# Patient Record
Sex: FEMALE | Race: WHITE | NOT HISPANIC OR LATINO | Employment: UNEMPLOYED | URBAN - NONMETROPOLITAN AREA
[De-identification: names, ages, dates, MRNs, and addresses within clinical notes are randomized per-mention and may not be internally consistent; named-entity substitution may affect disease eponyms.]

---

## 2017-01-19 ENCOUNTER — HOSPITAL ENCOUNTER (OUTPATIENT)
Dept: LAB | Facility: MEDICAL CENTER | Age: 67
End: 2017-01-19
Attending: NURSE PRACTITIONER
Payer: MEDICARE

## 2017-01-19 ENCOUNTER — TELEPHONE (OUTPATIENT)
Dept: MEDICAL GROUP | Facility: PHYSICIAN GROUP | Age: 67
End: 2017-01-19

## 2017-01-19 DIAGNOSIS — H53.9 VISUAL CHANGES: ICD-10-CM

## 2017-01-19 DIAGNOSIS — E78.00 PURE HYPERCHOLESTEROLEMIA: ICD-10-CM

## 2017-01-19 LAB
ALBUMIN SERPL BCP-MCNC: 4.4 G/DL (ref 3.2–4.9)
ALBUMIN/GLOB SERPL: 1.4 G/DL
ALP SERPL-CCNC: 70 U/L (ref 30–99)
ALT SERPL-CCNC: 27 U/L (ref 2–50)
ANION GAP SERPL CALC-SCNC: 9 MMOL/L (ref 0–11.9)
AST SERPL-CCNC: 29 U/L (ref 12–45)
BILIRUB SERPL-MCNC: 0.5 MG/DL (ref 0.1–1.5)
BUN SERPL-MCNC: 18 MG/DL (ref 8–22)
CALCIUM SERPL-MCNC: 9.5 MG/DL (ref 8.5–10.5)
CHLORIDE SERPL-SCNC: 106 MMOL/L (ref 96–112)
CHOLEST SERPL-MCNC: 233 MG/DL (ref 100–199)
CO2 SERPL-SCNC: 25 MMOL/L (ref 20–33)
CREAT SERPL-MCNC: 0.98 MG/DL (ref 0.5–1.4)
GLOBULIN SER CALC-MCNC: 3.1 G/DL (ref 1.9–3.5)
GLUCOSE SERPL-MCNC: 84 MG/DL (ref 65–99)
HDLC SERPL-MCNC: 68 MG/DL
LDLC SERPL CALC-MCNC: 141 MG/DL
POTASSIUM SERPL-SCNC: 3.8 MMOL/L (ref 3.6–5.5)
PROT SERPL-MCNC: 7.5 G/DL (ref 6–8.2)
SODIUM SERPL-SCNC: 140 MMOL/L (ref 135–145)
TRIGL SERPL-MCNC: 121 MG/DL (ref 0–149)

## 2017-01-19 PROCEDURE — 80061 LIPID PANEL: CPT

## 2017-01-19 PROCEDURE — 36415 COLL VENOUS BLD VENIPUNCTURE: CPT

## 2017-01-19 PROCEDURE — 80053 COMPREHEN METABOLIC PANEL: CPT

## 2017-01-19 NOTE — TELEPHONE ENCOUNTER
Hanna in for A1C this morning. Medicare will not except Visual Changes for DX code. Please advise.

## 2017-01-27 ENCOUNTER — OFFICE VISIT (OUTPATIENT)
Dept: MEDICAL GROUP | Facility: PHYSICIAN GROUP | Age: 67
End: 2017-01-27
Payer: MEDICARE

## 2017-01-27 ENCOUNTER — HOSPITAL ENCOUNTER (OUTPATIENT)
Facility: MEDICAL CENTER | Age: 67
End: 2017-01-27
Attending: NURSE PRACTITIONER
Payer: MEDICARE

## 2017-01-27 VITALS
WEIGHT: 201 LBS | DIASTOLIC BLOOD PRESSURE: 84 MMHG | TEMPERATURE: 98.4 F | RESPIRATION RATE: 16 BRPM | BODY MASS INDEX: 31.55 KG/M2 | OXYGEN SATURATION: 97 % | HEIGHT: 67 IN | HEART RATE: 80 BPM | SYSTOLIC BLOOD PRESSURE: 136 MMHG

## 2017-01-27 DIAGNOSIS — E66.9 OBESITY (BMI 30-39.9): ICD-10-CM

## 2017-01-27 DIAGNOSIS — R35.0 URINARY FREQUENCY: ICD-10-CM

## 2017-01-27 DIAGNOSIS — R94.4 DECREASED GFR: ICD-10-CM

## 2017-01-27 DIAGNOSIS — Z12.11 SCREENING FOR COLON CANCER: ICD-10-CM

## 2017-01-27 DIAGNOSIS — M54.9 RADIATING BACK PAIN: ICD-10-CM

## 2017-01-27 DIAGNOSIS — E78.49 OTHER HYPERLIPIDEMIA: ICD-10-CM

## 2017-01-27 DIAGNOSIS — Z80.0 FH: COLON CANCER: ICD-10-CM

## 2017-01-27 LAB
APPEARANCE UR: CLEAR
BILIRUB UR QL STRIP.AUTO: NEGATIVE
COLOR UR AUTO: NORMAL
CULTURE IF INDICATED INDCX: NO UA CULTURE
GLUCOSE UR STRIP.AUTO-MCNC: NEGATIVE MG/DL
KETONES UR STRIP.AUTO-MCNC: NEGATIVE MG/DL
LEUKOCYTE ESTERASE UR QL STRIP.AUTO: NEGATIVE
MICRO URNS: NORMAL
NITRITE UR QL STRIP.AUTO: NEGATIVE
PH UR: 5.5 [PH]
PROT UR QL STRIP: NEGATIVE MG/DL
RBC UR QL AUTO: NEGATIVE
SP GR UR STRIP.AUTO: 1.01

## 2017-01-27 PROCEDURE — G8419 CALC BMI OUT NRM PARAM NOF/U: HCPCS | Performed by: NURSE PRACTITIONER

## 2017-01-27 PROCEDURE — G8432 DEP SCR NOT DOC, RNG: HCPCS | Performed by: NURSE PRACTITIONER

## 2017-01-27 PROCEDURE — G8482 FLU IMMUNIZE ORDER/ADMIN: HCPCS | Performed by: NURSE PRACTITIONER

## 2017-01-27 PROCEDURE — 81003 URINALYSIS AUTO W/O SCOPE: CPT

## 2017-01-27 PROCEDURE — 3014F SCREEN MAMMO DOC REV: CPT | Performed by: NURSE PRACTITIONER

## 2017-01-27 PROCEDURE — 1036F TOBACCO NON-USER: CPT | Performed by: NURSE PRACTITIONER

## 2017-01-27 PROCEDURE — 3017F COLORECTAL CA SCREEN DOC REV: CPT | Performed by: NURSE PRACTITIONER

## 2017-01-27 PROCEDURE — 99214 OFFICE O/P EST MOD 30 MIN: CPT | Performed by: NURSE PRACTITIONER

## 2017-01-27 ASSESSMENT — PATIENT HEALTH QUESTIONNAIRE - PHQ9: CLINICAL INTERPRETATION OF PHQ2 SCORE: 0

## 2017-01-27 NOTE — ASSESSMENT & PLAN NOTE
Recent labs show a decreased GFR at 57. Looking at her past notes it appears that she has had this before:  Component      Latest Ref Rng 4/22/2015 11/5/2015 3/10/2016 9/2/2016           6:48 AM  7:54 AM  7:23 AM  7:18 AM   GFR If African American      >60 mL/min/1.73 m 2 >60 >60 >60 >60   GFR If Non African American      >60 mL/min/1.73 m 2 >60 54 (A) >60 >60     Component      Latest Ref Rng 1/19/2017           7:11 AM   GFR If African American      >60 mL/min/1.73 m 2 >60   GFR If Non African American      >60 mL/min/1.73 m 2 57 (A)   A number for meds could potentially causes specifically her Celebrex that she has been on for a number of years. We will check a urinalysis on her today as well since she is complaining of right back plane specifically around the CVA area as well as urinary frequency. We will monitor this as well. I am repeating a CMP in 6 months.

## 2017-01-27 NOTE — PATIENT INSTRUCTIONS
Continue on medication for cholesterol--Will recheck in 6 months    Continue with healthy diet, fresh fruits, veggies--less sugar and carbs    Will check urine today

## 2017-01-27 NOTE — PROGRESS NOTES
Chief Complaint   Patient presents with   • Follow-Up         This is a 66 y.o.female patient that presents today with the following: Follow-up, review labs, acute and chronic conditions    Hyperlipidemia  This is chronic in nature, stable much improved since starting on the TriCor 145 mg daily. Her lipid profile compared to her previous profiles are as follows:  Component      Latest Ref Rng 3/10/2016 9/2/2016 1/19/2017           7:23 AM  7:18 AM  7:11 AM   Cholesterol,Tot      100 - 199 mg/dL 252 (H) 271 (H) 233 (H)   Triglycerides      0 - 149 mg/dL 171 (H) 240 (H) 121   HDL      >=40 mg/dL 43 49 68   LDL      <100 mg/dL 175 (H) 174 (H) 141 (H)   She is tolerating the medication well with no significant or bothersome side effects. I will have her continue on this medication along with lifestyle modifications including healthier diet, increase physical activity and continued efforts towards weight loss. I would like her to continue with eating less animal protein, saturated fats, high carb foods. We will recheck a lipid profile in 6 months.    Decreased GFR  Recent labs show a decreased GFR at 57. Looking at her past notes it appears that she has had this before:  Component      Latest Ref Rng 4/22/2015 11/5/2015 3/10/2016 9/2/2016           6:48 AM  7:54 AM  7:23 AM  7:18 AM   GFR If African American      >60 mL/min/1.73 m 2 >60 >60 >60 >60   GFR If Non African American      >60 mL/min/1.73 m 2 >60 54 (A) >60 >60     Component      Latest Ref Rng 1/19/2017           7:11 AM   GFR If African American      >60 mL/min/1.73 m 2 >60   GFR If Non African American      >60 mL/min/1.73 m 2 57 (A)   A number for meds could potentially cause this, specifically her Celebrex that she has been on for a number of years. We will check a urinalysis on her today as well since she is complaining of right back pain, notably around the CVA area as well as urinary frequency. We will monitor this as well. I am repeating a CMP in 6  months, sooner if pain persists or worsens or becomes severe.    Radiating back pain  Patient is complaining of right CVA pain radiating into the right flank and right mid abdominal area. This is not described as wavelike, there is no accompanying nausea or vomiting. The pain is not described as excruciating. She is also complaining of urinary frequency and possibly hematuria, she states she does not actually see blood in her urine but does wear a panty liner because of rectal bleeding cause from hemorrhoids (see additional notes) and notices a thin line of faint reddish brownish blood on the liner. She is fairly certain this is not vaginal bleeding as she has had hysterectomy. I will get a urinalysis with culture if indicated. She does state she drinks plenty of fluids throughout the day. She does deny pain and burning with urination and suprapubic pain. She also denies fever. She denies history of kidney stones.    FH: colon cancer  Patient reports she has family history of colon cancer, her mother. She tells me she has colonoscopies every 5 years, her last colonoscopy was 2011 that assisted. Her due for colonoscopy in 2016 making her past due. She also tells me she has hemorrhoids and what she wears a panty liner because these cause her rectal bleeding. I will placed referral to gastroenterology for her follow-up colonoscopy.      Hospital Outpatient Visit on 01/19/2017   Component Date Value   • Sodium 01/19/2017 140    • Potassium 01/19/2017 3.8    • Chloride 01/19/2017 106    • Co2 01/19/2017 25    • Anion Gap 01/19/2017 9.0    • Glucose 01/19/2017 84    • Bun 01/19/2017 18    • Creatinine 01/19/2017 0.98    • Calcium 01/19/2017 9.5    • AST(SGOT) 01/19/2017 29    • ALT(SGPT) 01/19/2017 27    • Alkaline Phosphatase 01/19/2017 70    • Total Bilirubin 01/19/2017 0.5    • Albumin 01/19/2017 4.4    • Total Protein 01/19/2017 7.5    • Globulin 01/19/2017 3.1    • A-G Ratio 01/19/2017 1.4    • Cholesterol,Tot  01/19/2017 233*   • Triglycerides 01/19/2017 121    • HDL 01/19/2017 68    • LDL 01/19/2017 141*   • GFR If  01/19/2017 >60    • GFR If Non  [employer]meri* 01/19/2017 57*         clinical course has been stable    Past Medical History   Diagnosis Date   • GERD (gastroesophageal reflux disease)    • Arthritis    • IBD (inflammatory bowel disease)    • Fibromyalgia    • Hyperlipidemia    • CAD (coronary artery disease)    • Hypertension        Past Surgical History   Procedure Laterality Date   • Cholecystectomy     • Abdominal hysterectomy total     • Tonsillectomy     • Carpal tunnel release         Family History   Problem Relation Age of Onset   • Heart Disease Mother    • Cancer Mother      colorectal   • Heart Disease Father        Review of patient's allergies indicates no known allergies.    Current Outpatient Prescriptions Ordered in [facility]   Medication Sig Dispense Refill   • fenofibrate (TRICOR) 145 MG Tab Take 1 Tab by mouth every day. 90 Tab 1   • celecoxib (CELEBREX) 200 MG Cap TAKE ONE CAPSULE BY MOUTH TWICE DAILY 60 Cap 3   • cyclobenzaprine (FLEXERIL) 10 MG Tab Take 1 Tab by mouth 3 times a day as needed for Mild Pain. 30 Tab 1   • gabapentin (NEURONTIN) 300 MG Cap Take 2 tabs by mouth every morning and 3 tabs every evening 450 Cap 3   • omeprazole (PRILOSEC) 20 MG delayed-release capsule TAKE ONE CAPSULE BY MOUTH TWICE DAILY 60 Cap 11   • lisinopril-hydrochlorothiazide (PRINZIDE, ZESTORETIC) 10-12.5 MG per tablet Take 1 Tab by mouth every day. 30 Tab 11   • albuterol (VENTOLIN OR PROVENTIL) 108 (90 BASE) MCG/ACT AERS Inhale 2 Puffs by mouth every 6 hours as needed for Shortness of Breath. 8.5 g 3   • fluticasone (FLONASE) 50 MCG/ACT nasal spray Spray 1 Spray in nose every day. 16 g 11     No current [facility]-ordered facility-administered medications on file.       Constitutional ROS: No unexpected change in weight, No weakness, No unexplained fevers, sweats, or chills  Neck ROS: No lumps or  "masses, No swollen glands, No significant pain in neck  Pulmonary ROS: No chronic cough, sputum, or hemoptysis, No shortness of breath, No recent change in breathing  Cardiovascular ROS: No chest pain, No edema, No palpitations  Gastrointestinal ROS: No abdominal pain, No nausea, vomiting, diarrhea, or constipation. Positive for blood in stool, per history of present illness  Musculoskeletal/Extremities ROS: No clubbing, No peripheral edema, Positive for chronic pain  Neurologic ROS: Normal development, No seizures, No weakness  Genitourinary ROS: Positive per history of present illness    Physical exam:  /84 mmHg  Pulse 80  Temp(Src) 36.9 °C (98.4 °F)  Resp 16  Ht 1.714 m (5' 7.48\")  Wt 91.173 kg (201 lb)  BMI 31.03 kg/m2  SpO2 97%  Breastfeeding? No  General Appearance: Middle-aged older female, alert, no distress, obese, well-groomed  Skin: Skin color, texture, turgor normal. No rashes or lesions.  Back: Positive to mild to moderate pain with palpation to right CVA area  Lungs: negative findings: normal respiratory rate and rhythm, lungs clear to auscultation  Heart: negative. RRR without murmur, gallop, or rubs.  No ectopy.  Abdomen: Abdomen soft, BS normal. No masses,  No organomegaly, positive for mild to moderate pain to right upper quadrant  Musculoskeletal: positive findings: Decreased range of motion to the lumbar spine due to pain  Neurologic: intact, oriented, mood appropriate, judgment intact. Cranial nerves II-12 grossly intact     Medical decision making/discussion: Patient here for follow-up and to review labs as well as acute and chronic conditions. Her overall lipid profile has improved since starting on fenofibrate. She is to continue on this medication and we will recheck lipid profile in 6 months. I've also encouraged her to increase her physical activity, eat a healthier diet and continue efforts towards weight loss. We will check a urinalysis for her new onset right flank right " quadrant abdominal pain. I will call her with results and any further actions if needed and also placed a referral to gastroenterology as it is time for her follow-up colonoscopy, also for her ongoing rectal bleeding from her hemorrhoids. She is up-to-date with her health care maintenance exams. She is to follow-up with me in 6 months.    Hanna was seen today for follow-up.    Diagnoses and all orders for this visit:    Other hyperlipidemia  -     COMP METABOLIC PANEL; Future  -     LIPID PROFILE; Future    Radiating back pain  -     URINALYSIS,CULTURE IF INDICATED; Future    Urinary frequency  -     URINALYSIS,CULTURE IF INDICATED; Future    Decreased GFR  -     COMP METABOLIC PANEL; Future    FH: colon cancer  -     REFERRAL TO GASTROENTEROLOGY    Screening for colon cancer  -     REFERRAL TO GASTROENTEROLOGY    Obesity (BMI 30-39.9)  -     Patient identified as having weight management issue.  Appropriate orders and counseling given.          Please note that this dictation was created using voice recognition software. I have made every reasonable attempt to correct obvious errors, but I expect that there are errors of grammar and possibly content that I did not discover before finalizing the note.

## 2017-01-27 NOTE — MR AVS SNAPSHOT
"        Hanna Crespoin   2017 1:00 PM   Office Visit   MRN: [id number]    Department:  [facility]   Dept Phone:  [phone]    Description:  Female : 1950   Provider:  RUDY Ying           Reason for Visit     Follow-Up           Allergies as of 2017     No Known Allergies      You were diagnosed with     Radiating back pain   [070887]       Urinary frequency   [788.41.ICD-9-CM]       FH: colon cancer   [800409]       Screening for colon cancer   [604176]       Other hyperlipidemia   [8838380]       Decreased GFR   [553069]         Vital Signs     Blood Pressure Pulse Temperature Respirations Height Weight    136/84 mmHg 80 36.9 °C (98.4 °F) 16 1.714 m (5' 7.48\") 91.173 kg (201 lb)    Body Mass Index Oxygen Saturation Breastfeeding? Smoking Status          31.03 kg/m2 97% No Former Smoker        Basic Information     Date Of Birth Sex Race Ethnicity Preferred Language    1950 Female White Non- English      Your appointments     2017  1:00 PM   Established Patient with RUDY Ying   [address] [address] [phone]           You will be receiving a confirmation call a few days before your appointment from our automated call confirmation system.              Problem List              ICD-10-CM Priority Class Noted - Resolved    Hypertension I10   2014 - Present    Osteoarthritis M19.90   2014 - Present    GERD (gastroesophageal reflux disease) K21.9   2014 - Present    IBS (irritable bowel syndrome) K58.9   2014 - Present    Fibromyalgia M79.7   2014 - Present    Visual changes H53.9   2014 - Present    SOB (shortness of breath) R06.02   2014 - Present    Abnormal CT scan, lung R91.8   2014 - Present    Hyperlipidemia E78.5   3/12/2014 - Present    Muscle spasm M62.838   2014 - Present    Back pain M54.9   2014 - Present "    Chronic neck pain M54.2, G89.29   8/27/2014 - Present    Chronic pain of both shoulders M25.512, G89.29, M25.511   8/27/2014 - Present    Lump of skin of back R22.2   9/30/2014 - Present    Neck mass R22.1   9/30/2014 - Present    DDD (degenerative disc disease), cervical M50.30   9/30/2014 - Present    Right foot pain M79.671   9/30/2014 - Present    Obesity (BMI 30.0-34.9) E66.9   9/30/2014 - Present    Mammogram abnormal R92.8   1/14/2015 - Present    Onychomycosis B35.1   4/14/2015 - Present    Eye twitch G24.5   6/30/2015 - Present    Environmental allergies Z91.09   11/3/2015 - Present    Trigger finger of right hand M65.30   8/25/2016 - Present      Health Maintenance        Date Due Completion Dates    IMM DTaP/Tdap/Td Vaccine (1 - Tdap) 4/20/1971 ---    MAMMOGRAM 11/3/2017 11/3/2016, 2/4/2015, 1/6/2015, 1/6/2015 (Prv Comp)    Override on 1/6/2015: Previously completed    COLONOSCOPY 1/9/2021 1/9/2011 (Prv Comp)    Override on 1/9/2011: Previously completed            Current Immunizations     Influenza TIV (IM) 10/2/2014    Influenza Vaccine Adult HD 11/1/2016    SHINGLES VACCINE 10/2/2014      Below and/or attached are the medications your provider expects you to take. Review all of your home medications and newly ordered medications with your provider and/or pharmacist. Follow medication instructions as directed by your provider and/or pharmacist. Please keep your medication list with you and share with your provider. Update the information when medications are discontinued, doses are changed, or new medications (including over-the-counter products) are added; and carry medication information at all times in the event of emergency situations     Allergies:  No Known Allergies          Medications  Valid as of: January 27, 2017 -  1:52 PM    Generic Name Brand Name Tablet Size Instructions for use    Albuterol Sulfate (Aero Soln) albuterol 108 (90 BASE) MCG/ACT Inhale 2 Puffs by mouth every 6 hours as  needed for Shortness of Breath.        Celecoxib (Cap) CELEBREX 200 MG TAKE ONE CAPSULE BY MOUTH TWICE DAILY        Cyclobenzaprine HCl (Tab) FLEXERIL 10 MG Take 1 Tab by mouth 3 times a day as needed for Mild Pain.        Fenofibrate (Tab) TRICOR 145 MG Take 1 Tab by mouth every day.        Fluticasone Propionate (Suspension) FLONASE 50 MCG/ACT Spray 1 Spray in nose every day.        Gabapentin (Cap) NEURONTIN 300 MG Take 2 tabs by mouth every morning and 3 tabs every evening        Lisinopril-Hydrochlorothiazide (Tab) PRINZIDE, ZESTORETIC 10-12.5 MG Take 1 Tab by mouth every day.        Omeprazole (CAPSULE DELAYED RELEASE) PRILOSEC 20 MG TAKE ONE CAPSULE BY MOUTH TWICE DAILY        .                 Medicines prescribed today were sent to:     [facility] [address] [place] [address], [place] - [address] [facility]    [address] [facility] [place] [address]    Phone: [phone] Fax: [phone]    Open 24 Hours?: No      Medication refill instructions:       If your prescription bottle indicates you have medication refills left, it is not necessary to call your provider’s office. Please contact your pharmacy and they will refill your medication.    If your prescription bottle indicates you do not have any refills left, you may request refills at any time through one of the following ways: The online [employer] system (except Urgent Care), by calling your provider’s office, or by asking your pharmacy to contact your provider’s office with a refill request. Medication refills are processed only during regular business hours and may not be available until the next business day. Your provider may request additional information or to have a follow-up visit with you prior to refilling your medication.   *Please Note: Medication refills are assigned a new Rx number when refilled electronically. Your pharmacy may indicate that no refills were authorized even though a new prescription for the same medication is available at the  pharmacy. Please request the medicine by name with the pharmacy before contacting your provider for a refill.        Your To Do List     Future Labs/Procedures Complete By Expires    COMP METABOLIC PANEL  As directed 1/27/2018    LIPID PROFILE  As directed 1/27/2018    URINALYSIS,CULTURE IF INDICATED  As directed 1/27/2018      Referral     A referral request has been sent to our patient care coordination department. Please allow 3-5 business days for us to process this request and contact you either by phone or mail. If you do not hear from us by the 5th business day, please call us at [phone].        Instructions    Continue on medication for cholesterol--Will recheck in 6 months    Continue with healthy diet, fresh fruits, veggies--less sugar and carbs    Will check urine today       Other Notes About Your Plan     Last UDS:  12.17.15             [employer] Access Code: Activation code not generated  Current [employer] Status: Active

## 2017-01-27 NOTE — ASSESSMENT & PLAN NOTE
This is chronic in nature, stable much improved since starting on the TriCor 145 mg daily. Her lipid profile compared to her previous profiles are as follows:  Component      Latest Ref Rng 3/10/2016 9/2/2016 1/19/2017           7:23 AM  7:18 AM  7:11 AM   Cholesterol,Tot      100 - 199 mg/dL 252 (H) 271 (H) 233 (H)   Triglycerides      0 - 149 mg/dL 171 (H) 240 (H) 121   HDL      >=40 mg/dL 43 49 68   LDL      <100 mg/dL 175 (H) 174 (H) 141 (H)   She is tolerating the medication well with no significant or bothersome side effects. I will have her continue on this medication along with lifestyle modifications including healthier diet, increase physical activity and continued efforts towards weight loss. I would like her to continue with eating less animal protein, saturated fats, high carb foods. We will recheck a lipid profile in 6 months.

## 2017-01-27 NOTE — ASSESSMENT & PLAN NOTE
Patient reports she has family history of colon cancer, her mother. She tells me she has colonoscopies every 5 years, her last colonoscopy was 2011 that assisted. Her due for colonoscopy in 2016 making her past due. She also tells me she has hemorrhoids and what she wears a panty liner because these cause her rectal bleeding. I will placed referral to gastroenterology for her follow-up colonoscopy.

## 2017-01-27 NOTE — ASSESSMENT & PLAN NOTE
Patient is complaining of right CVA pain radiating into the right flank and right mid abdominal area. This is not described as wavelike, there is no accompanying nausea or vomiting. The pain is not described as excruciating. She is also complaining of urinary frequency and possibly hematuria, she states she does not actually see blood in her urine but does wear a panty liner because of rectal bleeding cause from hemorrhoids (see additional notes) and notices a thin line of faint reddish brownish blood on the liner. She is fairly certain this is not vaginal bleeding as she has had hysterectomy. I will get a urinalysis with culture if indicated. She does state she drinks plenty of fluids throughout the day. She does deny pain and burning with urination and suprapubic pain. She also denies fever. She denies history of kidney stones.

## 2017-03-23 RX ORDER — CELECOXIB 200 MG/1
CAPSULE ORAL
Qty: 60 CAP | Refills: 5 | Status: SHIPPED | OUTPATIENT
Start: 2017-03-23 | End: 2017-10-06 | Stop reason: SDUPTHER

## 2017-04-06 DIAGNOSIS — K21.9 GASTROESOPHAGEAL REFLUX DISEASE WITHOUT ESOPHAGITIS: ICD-10-CM

## 2017-04-06 DIAGNOSIS — I10 ESSENTIAL HYPERTENSION: ICD-10-CM

## 2017-04-07 RX ORDER — OMEPRAZOLE 20 MG/1
CAPSULE, DELAYED RELEASE ORAL
Qty: 180 CAP | Refills: 1 | Status: SHIPPED | OUTPATIENT
Start: 2017-04-07 | End: 2017-10-06 | Stop reason: SDUPTHER

## 2017-04-07 RX ORDER — LISINOPRIL AND HYDROCHLOROTHIAZIDE 12.5; 1 MG/1; MG/1
1 TABLET ORAL
Qty: 90 TAB | Refills: 1 | Status: SHIPPED | OUTPATIENT
Start: 2017-04-07 | End: 2017-10-06 | Stop reason: SDUPTHER

## 2017-04-07 NOTE — TELEPHONE ENCOUNTER
Refill X 6 months, sent to pharmacy.Pt. Seen in the last 6 months per protocol.   Lab Results   Component Value Date/Time    SODIUM 140 01/19/2017 07:11 AM    POTASSIUM 3.8 01/19/2017 07:11 AM    CHLORIDE 106 01/19/2017 07:11 AM    CO2 25 01/19/2017 07:11 AM    GLUCOSE 84 01/19/2017 07:11 AM    BUN 18 01/19/2017 07:11 AM    CREATININE 0.98 01/19/2017 07:11 AM

## 2017-04-11 ENCOUNTER — TELEPHONE (OUTPATIENT)
Dept: MEDICAL GROUP | Facility: PHYSICIAN GROUP | Age: 67
End: 2017-04-11

## 2017-04-12 DIAGNOSIS — E78.00 PURE HYPERCHOLESTEROLEMIA: ICD-10-CM

## 2017-04-13 RX ORDER — FENOFIBRATE 145 MG/1
145 TABLET, COATED ORAL DAILY
Qty: 90 TAB | Refills: 0 | Status: SHIPPED | OUTPATIENT
Start: 2017-04-13 | End: 2017-05-15

## 2017-04-14 DIAGNOSIS — E78.49 OTHER HYPERLIPIDEMIA: ICD-10-CM

## 2017-04-14 RX ORDER — SIMVASTATIN 40 MG
40 TABLET ORAL EVERY EVENING
Qty: 90 TAB | Refills: 1 | Status: SHIPPED | OUTPATIENT
Start: 2017-04-14 | End: 2017-05-15

## 2017-05-11 ENCOUNTER — TELEPHONE (OUTPATIENT)
Dept: MEDICAL GROUP | Facility: PHYSICIAN GROUP | Age: 67
End: 2017-05-11

## 2017-05-15 NOTE — TELEPHONE ENCOUNTER
Spoke with patient she will not take Simvastatin because of muscle pain, Zocor did nothing to help her numbers.  She is not taking anything right now.

## 2017-06-05 DIAGNOSIS — M15.9 OSTEOARTHRITIS OF MULTIPLE JOINTS, UNSPECIFIED OSTEOARTHRITIS TYPE: ICD-10-CM

## 2017-06-05 RX ORDER — GABAPENTIN 300 MG/1
CAPSULE ORAL
Qty: 450 CAP | Refills: 0 | Status: SHIPPED | OUTPATIENT
Start: 2017-06-05 | End: 2017-09-02 | Stop reason: SDUPTHER

## 2017-07-17 ENCOUNTER — HOSPITAL ENCOUNTER (EMERGENCY)
Facility: MEDICAL CENTER | Age: 67
End: 2017-07-17
Attending: EMERGENCY MEDICINE
Payer: MEDICARE

## 2017-07-17 ENCOUNTER — APPOINTMENT (OUTPATIENT)
Dept: RADIOLOGY | Facility: MEDICAL CENTER | Age: 67
End: 2017-07-17
Attending: EMERGENCY MEDICINE
Payer: MEDICARE

## 2017-07-17 VITALS
WEIGHT: 188 LBS | TEMPERATURE: 97.3 F | RESPIRATION RATE: 16 BRPM | OXYGEN SATURATION: 96 % | HEART RATE: 87 BPM | HEIGHT: 68 IN | BODY MASS INDEX: 28.49 KG/M2 | SYSTOLIC BLOOD PRESSURE: 107 MMHG | DIASTOLIC BLOOD PRESSURE: 80 MMHG

## 2017-07-17 DIAGNOSIS — K29.00 ACUTE GASTRITIS WITHOUT HEMORRHAGE, UNSPECIFIED GASTRITIS TYPE: ICD-10-CM

## 2017-07-17 DIAGNOSIS — N30.01 ACUTE CYSTITIS WITH HEMATURIA: ICD-10-CM

## 2017-07-17 LAB
ALBUMIN SERPL BCP-MCNC: 4.2 G/DL (ref 3.2–4.9)
ALBUMIN/GLOB SERPL: 1.3 G/DL
ALP SERPL-CCNC: 99 U/L (ref 30–99)
ALT SERPL-CCNC: 12 U/L (ref 2–50)
ANION GAP SERPL CALC-SCNC: 13 MMOL/L (ref 0–11.9)
APPEARANCE UR: ABNORMAL
AST SERPL-CCNC: 20 U/L (ref 12–45)
BACTERIA #/AREA URNS HPF: ABNORMAL /HPF
BASOPHILS # BLD AUTO: 0.4 % (ref 0–1.8)
BASOPHILS # BLD: 0.05 K/UL (ref 0–0.12)
BILIRUB SERPL-MCNC: 0.6 MG/DL (ref 0.1–1.5)
BILIRUB UR QL STRIP.AUTO: NEGATIVE
BUN SERPL-MCNC: 24 MG/DL (ref 8–22)
CALCIUM SERPL-MCNC: 9.4 MG/DL (ref 8.5–10.5)
CHLORIDE SERPL-SCNC: 107 MMOL/L (ref 96–112)
CO2 SERPL-SCNC: 20 MMOL/L (ref 20–33)
COLOR UR: YELLOW
CREAT SERPL-MCNC: 0.78 MG/DL (ref 0.5–1.4)
CULTURE IF INDICATED INDCX: YES UA CULTURE
EOSINOPHIL # BLD AUTO: 0.14 K/UL (ref 0–0.51)
EOSINOPHIL NFR BLD: 1 % (ref 0–6.9)
EPI CELLS #/AREA URNS HPF: ABNORMAL /HPF
ERYTHROCYTE [DISTWIDTH] IN BLOOD BY AUTOMATED COUNT: 44.1 FL (ref 35.9–50)
GFR SERPL CREATININE-BSD FRML MDRD: >60 ML/MIN/1.73 M 2
GLOBULIN SER CALC-MCNC: 3.2 G/DL (ref 1.9–3.5)
GLUCOSE SERPL-MCNC: 101 MG/DL (ref 65–99)
GLUCOSE UR STRIP.AUTO-MCNC: NEGATIVE MG/DL
HCT VFR BLD AUTO: 43.9 % (ref 37–47)
HGB BLD-MCNC: 14.5 G/DL (ref 12–16)
IMM GRANULOCYTES # BLD AUTO: 0.08 K/UL (ref 0–0.11)
IMM GRANULOCYTES NFR BLD AUTO: 0.6 % (ref 0–0.9)
KETONES UR STRIP.AUTO-MCNC: NEGATIVE MG/DL
LEUKOCYTE ESTERASE UR QL STRIP.AUTO: ABNORMAL
LIPASE SERPL-CCNC: 20 U/L (ref 11–82)
LYMPHOCYTES # BLD AUTO: 1.37 K/UL (ref 1–4.8)
LYMPHOCYTES NFR BLD: 9.8 % (ref 22–41)
MCH RBC QN AUTO: 29.3 PG (ref 27–33)
MCHC RBC AUTO-ENTMCNC: 33 G/DL (ref 33.6–35)
MCV RBC AUTO: 88.7 FL (ref 81.4–97.8)
MICRO URNS: ABNORMAL
MONOCYTES # BLD AUTO: 0.76 K/UL (ref 0–0.85)
MONOCYTES NFR BLD AUTO: 5.4 % (ref 0–13.4)
NEUTROPHILS # BLD AUTO: 11.61 K/UL (ref 2–7.15)
NEUTROPHILS NFR BLD: 82.8 % (ref 44–72)
NITRITE UR QL STRIP.AUTO: NEGATIVE
NRBC # BLD AUTO: 0 K/UL
NRBC BLD AUTO-RTO: 0 /100 WBC
PH UR STRIP.AUTO: 5 [PH]
PLATELET # BLD AUTO: 295 K/UL (ref 164–446)
PMV BLD AUTO: 9.7 FL (ref 9–12.9)
POTASSIUM SERPL-SCNC: 3.5 MMOL/L (ref 3.6–5.5)
PROT SERPL-MCNC: 7.4 G/DL (ref 6–8.2)
PROT UR QL STRIP: 30 MG/DL
RBC # BLD AUTO: 4.95 M/UL (ref 4.2–5.4)
RBC # URNS HPF: ABNORMAL /HPF
RBC UR QL AUTO: ABNORMAL
SODIUM SERPL-SCNC: 140 MMOL/L (ref 135–145)
SP GR UR STRIP.AUTO: 1.03
TROPONIN I SERPL-MCNC: <0.01 NG/ML (ref 0–0.04)
WBC # BLD AUTO: 14 K/UL (ref 4.8–10.8)
WBC #/AREA URNS HPF: ABNORMAL /HPF

## 2017-07-17 PROCEDURE — 36415 COLL VENOUS BLD VENIPUNCTURE: CPT

## 2017-07-17 PROCEDURE — 87086 URINE CULTURE/COLONY COUNT: CPT

## 2017-07-17 PROCEDURE — 85025 COMPLETE CBC W/AUTO DIFF WBC: CPT

## 2017-07-17 PROCEDURE — A9270 NON-COVERED ITEM OR SERVICE: HCPCS | Performed by: EMERGENCY MEDICINE

## 2017-07-17 PROCEDURE — 700111 HCHG RX REV CODE 636 W/ 250 OVERRIDE (IP): Performed by: EMERGENCY MEDICINE

## 2017-07-17 PROCEDURE — 700105 HCHG RX REV CODE 258: Performed by: EMERGENCY MEDICINE

## 2017-07-17 PROCEDURE — 80053 COMPREHEN METABOLIC PANEL: CPT

## 2017-07-17 PROCEDURE — 74177 CT ABD & PELVIS W/CONTRAST: CPT

## 2017-07-17 PROCEDURE — 84484 ASSAY OF TROPONIN QUANT: CPT

## 2017-07-17 PROCEDURE — 93005 ELECTROCARDIOGRAM TRACING: CPT | Performed by: EMERGENCY MEDICINE

## 2017-07-17 PROCEDURE — 700117 HCHG RX CONTRAST REV CODE 255: Performed by: EMERGENCY MEDICINE

## 2017-07-17 PROCEDURE — 81001 URINALYSIS AUTO W/SCOPE: CPT

## 2017-07-17 PROCEDURE — 87077 CULTURE AEROBIC IDENTIFY: CPT

## 2017-07-17 PROCEDURE — 83690 ASSAY OF LIPASE: CPT

## 2017-07-17 PROCEDURE — 96374 THER/PROPH/DIAG INJ IV PUSH: CPT

## 2017-07-17 PROCEDURE — 96376 TX/PRO/DX INJ SAME DRUG ADON: CPT

## 2017-07-17 PROCEDURE — 99285 EMERGENCY DEPT VISIT HI MDM: CPT

## 2017-07-17 PROCEDURE — 87186 SC STD MICRODIL/AGAR DIL: CPT

## 2017-07-17 PROCEDURE — 700102 HCHG RX REV CODE 250 W/ 637 OVERRIDE(OP): Performed by: EMERGENCY MEDICINE

## 2017-07-17 PROCEDURE — 96361 HYDRATE IV INFUSION ADD-ON: CPT

## 2017-07-17 RX ORDER — NITROFURANTOIN 25; 75 MG/1; MG/1
100 CAPSULE ORAL ONCE
Status: COMPLETED | OUTPATIENT
Start: 2017-07-17 | End: 2017-07-17

## 2017-07-17 RX ORDER — NITROFURANTOIN 25; 75 MG/1; MG/1
100 CAPSULE ORAL 2 TIMES DAILY
Qty: 14 CAP | Refills: 0 | Status: SHIPPED | OUTPATIENT
Start: 2017-07-17 | End: 2017-07-24

## 2017-07-17 RX ORDER — SODIUM CHLORIDE 9 MG/ML
1000 INJECTION, SOLUTION INTRAVENOUS ONCE
Status: COMPLETED | OUTPATIENT
Start: 2017-07-17 | End: 2017-07-17

## 2017-07-17 RX ORDER — ONDANSETRON 2 MG/ML
4 INJECTION INTRAMUSCULAR; INTRAVENOUS ONCE
Status: COMPLETED | OUTPATIENT
Start: 2017-07-17 | End: 2017-07-17

## 2017-07-17 RX ORDER — ONDANSETRON 4 MG/1
4 TABLET, ORALLY DISINTEGRATING ORAL EVERY 8 HOURS PRN
Qty: 10 TAB | Refills: 0 | Status: ON HOLD | OUTPATIENT
Start: 2017-07-17 | End: 2018-07-31

## 2017-07-17 RX ADMIN — ONDANSETRON 4 MG: 2 INJECTION INTRAMUSCULAR; INTRAVENOUS at 09:00

## 2017-07-17 RX ADMIN — LIDOCAINE HYDROCHLORIDE 30 ML: 20 SOLUTION OROPHARYNGEAL at 05:39

## 2017-07-17 RX ADMIN — IOHEXOL 100 ML: 350 INJECTION, SOLUTION INTRAVENOUS at 07:15

## 2017-07-17 RX ADMIN — ONDANSETRON 4 MG: 2 INJECTION INTRAMUSCULAR; INTRAVENOUS at 05:40

## 2017-07-17 RX ADMIN — SODIUM CHLORIDE 1000 ML: 9 INJECTION, SOLUTION INTRAVENOUS at 05:39

## 2017-07-17 RX ADMIN — NITROFURANTOIN (MONOHYDRATE/MACROCRYSTALS) 100 MG: 75; 25 CAPSULE ORAL at 09:00

## 2017-07-17 NOTE — DISCHARGE PLANNING
RN reported that the pt needed a ride back to [place]. SHELLEY called [facility] who set up transport for 1400. SHELLEY met with pt in [facility] who stated that her ex spouse is coming to pick her up, and informed SW that she could cancel the transport. SW call transport and canceled the pts ride.

## 2017-07-17 NOTE — ED PROVIDER NOTES
"ED Provider Note    CHIEF COMPLAINT  Chief Complaint   Patient presents with   • N/V   • Diarrhea   • Gastrophageal Reflux         HPI  Hanna Marcelo is a 67 y.o. female who presents with epigastric abdominal pain. He has had pain like this off and on for the last 10 years. She attributed to gastritis. It will periodically flareup. Over the last 5 days her symptoms become worse. Burning discomfort epigastrium. Maybe slightly worse on the right than on the left side. Associated nausea with watery nonbloody stool today. She reports a couple weeks ago she had some burning dysuria. No urinary tract symptoms since then. No abnormal vaginal discharge or bleeding and no current chest pain or shortness of breath.    REVIEW OF SYSTEMS  As per HPI  All other systems are negative.     PAST MEDICAL HISTORY  Past Medical History   Diagnosis Date   • GERD (gastroesophageal reflux disease)    • Arthritis    • IBD (inflammatory bowel disease)    • Fibromyalgia    • Hyperlipidemia    • CAD (coronary artery disease)    • Hypertension        FAMILY HISTORY  Family History   Problem Relation Age of Onset   • Heart Disease Mother    • Cancer Mother      colorectal   • Heart Disease Father        SOCIAL HISTORY  Social History   Substance Use Topics   • Smoking status: Former Smoker     Quit date: 10/07/2007   • Smokeless tobacco: Never Used      Comment: Quit 2007   • Alcohol Use: 0.0 oz/week     0 Standard drinks or equivalent per week      Comment: Rarely       SURGICAL HISTORY  Past Surgical History   Procedure Laterality Date   • Cholecystectomy     • Abdominal hysterectomy total     • Tonsillectomy     • Carpal tunnel release         CURRENT MEDICATIONS  Home Medications     **Home medications have not yet been reviewed for this encounter**          ALLERGIES  No Known Allergies    PHYSICAL EXAM  VITAL SIGNS: /80 mmHg  Pulse 104  Temp(Src) 36.3 °C (97.3 °F)  Resp 16  Ht 1.727 m (5' 8\")  Wt 85.276 kg (188 lb)  BMI " 28.59 kg/m2  SpO2 95%  Constitutional: Awake and alert. Anxious appearing  HENT: Normal inspection  Eyes: Sclera white  Neck: Normal range of motion  Cardiovascular: Normal heart rate, Normal rhythm  Thorax & Lungs: Normal breath sounds, No respiratory distress, No wheezing, No chest tenderness.   Abdomen: Mild diffuse tenderness. No rebound or peritonitis  Skin: No rash.   Back: No tenderness, No CVA tenderness.   Extremities: Intact, symmetric distal pulses, no edema.  Neurologic: Grossly normal    RADIOLOGY/PROCEDURES  CT-ABDOMEN-PELVIS WITH   Final Result      1.  No acute abdominal or pelvic findings.   2.  Colonic diverticulosis without diverticulitis.   3.  Small hiatal hernia.           Imaging is interpreted by radiologist    Labs:   Results for orders placed or performed during the hospital encounter of 07/17/17   CBC WITH DIFFERENTIAL   Result Value Ref Range    WBC 14.0 (H) 4.8 - 10.8 K/uL    RBC 4.95 4.20 - 5.40 M/uL    Hemoglobin 14.5 12.0 - 16.0 g/dL    Hematocrit 43.9 37.0 - 47.0 %    MCV 88.7 81.4 - 97.8 fL    MCH 29.3 27.0 - 33.0 pg    MCHC 33.0 (L) 33.6 - 35.0 g/dL    RDW 44.1 35.9 - 50.0 fL    Platelet Count 295 164 - 446 K/uL    MPV 9.7 9.0 - 12.9 fL    Neutrophils-Polys 82.80 (H) 44.00 - 72.00 %    Lymphocytes 9.80 (L) 22.00 - 41.00 %    Monocytes 5.40 0.00 - 13.40 %    Eosinophils 1.00 0.00 - 6.90 %    Basophils 0.40 0.00 - 1.80 %    Immature Granulocytes 0.60 0.00 - 0.90 %    Nucleated RBC 0.00 /100 WBC    Neutrophils (Absolute) 11.61 (H) 2.00 - 7.15 K/uL    Lymphs (Absolute) 1.37 1.00 - 4.80 K/uL    Monos (Absolute) 0.76 0.00 - 0.85 K/uL    Eos (Absolute) 0.14 0.00 - 0.51 K/uL    Baso (Absolute) 0.05 0.00 - 0.12 K/uL    Immature Granulocytes (abs) 0.08 0.00 - 0.11 K/uL    NRBC (Absolute) 0.00 K/uL   COMP METABOLIC PANEL   Result Value Ref Range    Sodium 140 135 - 145 mmol/L    Potassium 3.5 (L) 3.6 - 5.5 mmol/L    Chloride 107 96 - 112 mmol/L    Co2 20 20 - 33 mmol/L    Anion Gap 13.0 (H)  0.0 - 11.9    Glucose 101 (H) 65 - 99 mg/dL    Bun 24 (H) 8 - 22 mg/dL    Creatinine 0.78 0.50 - 1.40 mg/dL    Calcium 9.4 8.5 - 10.5 mg/dL    AST(SGOT) 20 12 - 45 U/L    ALT(SGPT) 12 2 - 50 U/L    Alkaline Phosphatase 99 30 - 99 U/L    Total Bilirubin 0.6 0.1 - 1.5 mg/dL    Albumin 4.2 3.2 - 4.9 g/dL    Total Protein 7.4 6.0 - 8.2 g/dL    Globulin 3.2 1.9 - 3.5 g/dL    A-G Ratio 1.3 g/dL   LIPASE   Result Value Ref Range    Lipase 20 11 - 82 U/L   URINALYSIS CULTURE, IF INDICATED   Result Value Ref Range    Micro Urine Req Microscopic     Color Yellow     Character Hazy (A)     Specific Gravity 1.030 <1.035    Ph 5.0 5.0-8.0    Glucose Negative Negative mg/dL    Ketones Negative Negative mg/dL    Protein 30 (A) Negative mg/dL    Bilirubin Negative Negative    Nitrite Negative Negative    Leukocyte Esterase Trace (A) Negative    Occult Blood Trace (A) Negative    Culture Indicated Yes UA Culture   TROPONIN   Result Value Ref Range    Troponin I <0.01 0.00 - 0.04 ng/mL   ESTIMATED GFR   Result Value Ref Range    GFR If African American >60 >60 mL/min/1.73 m 2    GFR If Non African American >60 >60 mL/min/1.73 m 2   URINE MICROSCOPIC (W/UA)   Result Value Ref Range    WBC 5-10 (A) /hpf    RBC 2-5 (A) /hpf    Bacteria Moderate (A) None /hpf    Epithelial Cells Few /hpf         COURSE & MEDICAL DECISION MAKING  Patient presents to ER with nausea vomiting and primarily epigastric abdominal pain. She was tachycardic. She did not have a surgical exam. She is status post cholecystectomy.  Differential includes: Gastritis/GERD, choledocholithiasis, cholangitis, pancreatitis, gastritis, hepatitis, hepatic abscess, portal vein thrombosis, mesenteric ischemia, AAA, bowel obstruction, bowel perforation, appendicitis, diverticulitis, pyelonephritis, ACS, PE, pneumonia. An IV was established. She is given Zofran intravenously. She was given a GI cocktail. She had resolution of her symptoms. She later had some recurrence of nausea  and was given a 2nd dose of Zofran. Appeared mildly dehydrated on her exam. Given 1 L of normal saline with improvement. Workup returned as above. CT scan of the abdomen and pelvis was reassuring. She may have a urinary tract infection with significant bacteriuria. This will be treated with Macrobid. She is given 1st dose in the ER. I suspect most likely she is suffering from gastritis with reflux. Perhaps exacerbated by UTI. I advised continued daily PPI. I've given her prescription for Zofran. Advised push fluids and a bland diet. She is to return to the ER once for worsening symptoms, not improving, fevers or black or bloody stool or concern.    Patient referred to primary provider for blood pressure management    FINAL IMPRESSION  1. Epigastric abdominal pain  2. Gastritis  3. Diarrhea  4. Hiatal hernia  5. Cystitis with hematuria          This dictation was created using voice recognition software. The accuracy of the dictation is limited to the abilities of the software.  The nursing notes were reviewed and certain aspects of this information were incorporated into this note.    Electronically signed by: Kodi Barclay, 7/17/2017 5:42 AM

## 2017-07-17 NOTE — ED AVS SNAPSHOT
Starmount Access Code: Activation code not generated  Current Starmount Status: Active    [employer]  A secure, online tool to manage your health information     [employer] Starmount® is a secure, online tool that connects you to your personalized health information from the privacy of your home -- day or night - making it very easy for you to manage your healthcare. Once the activation process is completed, you can even access your medical information using the Starmount sofie, which is available for free in the Apple Sofie store or Google Play store.     Starmount provides the following levels of access (as shown below):   My Chart Features   [facility] Primary Care Doctor [facility]  Specialists [facility]  Urgent  Care Non-[facility]  Primary Care  Doctor   Email your healthcare team securely and privately 24/7 X X X X   Manage appointments: schedule your next appointment; view details of past/upcoming appointments X      Request prescription refills. X      View recent personal medical records, including lab and immunizations X X X X   View health record, including health history, allergies, medications X X X X   Read reports about your outpatient visits, procedures, consult and ER notes X X X X   See your discharge summary, which is a recap of your hospital and/or ER visit that includes your diagnosis, lab results, and care plan. X X       How to register for Starmount:  1. Go to  [email or web address].  2. Click on the Sign Up Now box, which takes you to the New Member Sign Up page. You will need to provide the following information:  a. Enter your Starmount Access Code exactly as it appears at the top of this page. (You will not need to use this code after you’ve completed the sign-up process. If you do not sign up before the expiration date, you must request a new code.)   b. Enter your date of birth.   c. Enter your home email address.   d. Click Submit, and follow the next screen’s instructions.  3. Create a Starmount ID. This will  be your [employer] login ID and cannot be changed, so think of one that is secure and easy to remember.  4. Create a [employer] password. You can change your password at any time.  5. Enter your Password Reset Question and Answer. This can be used at a later time if you forget your password.   6. Enter your e-mail address. This allows you to receive e-mail notifications when new information is available in [employer].  7. Click Sign Up. You can now view your health information.    For assistance activating your [employer] account, call [phone]

## 2017-07-17 NOTE — DISCHARGE PLANNING
Per ER SHELLEY Hamilton, spoke with Penny in [facility], they will transport patient to her home in [place]  today at 1400 via the [facility] van. ER SHELLEY has been advised of transport time

## 2017-07-17 NOTE — ED AVS SNAPSHOT
Home Care Instructions                                                                                                                Hanna Marcelo   MRN: [id number]    Department:  [facility], Emergency Dept   Date of Visit:  7/17/2017            [facility], Emergency Dept    [address]    [place] [address]    Phone:  [phone]      You were seen by     Kodi Barclay M.D.      Your Diagnosis Was     Acute gastritis without hemorrhage, unspecified gastritis type     K29.00       These are the medications you received during your hospitalization from 07/17/2017 0458 to 07/17/2017 0910     Date/Time Order Dose Route Action    07/17/2017 0539 NS infusion 1,000 mL 1,000 mL Intravenous New Bag    07/17/2017 0540 ondansetron (ZOFRAN) syringe/vial injection 4 mg 4 mg Intravenous Given    07/17/2017 0539 hyoscyamine-maalox plus-lidocaine viscous (GI COCKTAIL) oral susp 30 mL 30 mL Oral Given    07/17/2017 0715 iohexol (OMNIPAQUE) 350 mg/mL 100 mL Intravenous Given    07/17/2017 0900 nitrofurantoin monohydr macro (MACROBID) capsule CAPS 100 mg 100 mg Oral Given    07/17/2017 0900 ondansetron (ZOFRAN) syringe/vial injection 4 mg 4 mg Intravenous Given      Follow-up Information     1. Follow up with RUDY Ying. Call today.    Specialty:  Family Medicine    Contact information    [address] [place] [address] [address] [place] [address] [phone]        Medication Information     Review all of your home medications and newly ordered medications with your primary doctor and/or pharmacist as soon as possible. Follow medication instructions as directed by your doctor and/or pharmacist.     Please keep your complete medication list with you and share with your physician. Update the information when medications are discontinued, doses are changed, or new medications (including over-the-counter products) are added; and carry medication information at all times in the event  of emergency situations.               Medication List      START taking these medications        Instructions    Morning Afternoon Evening Bedtime    nitrofurantoin monohydr macro 100 MG Caps   Last time this was given:  100 mg on 7/17/2017  9:00 AM   Commonly known as:  MACROBID        Take 1 Cap by mouth 2 times a day for 7 days.   Dose:  100 mg                        ondansetron 4 MG Tbdp   Commonly known as:  ZOFRAN ODT        Take 1 Tab by mouth every 8 hours as needed.   Dose:  4 mg                          ASK your doctor about these medications        Instructions    Morning Afternoon Evening Bedtime    albuterol 108 (90 BASE) MCG/ACT Aers inhalation aerosol        Inhale 2 Puffs by mouth every 6 hours as needed for Shortness of Breath.   Dose:  2 Puff                        celecoxib 200 MG Caps   Commonly known as:  CELEBREX        TAKE ONE CAPSULE BY MOUTH TWICE DAILY                        cyclobenzaprine 10 MG Tabs   Commonly known as:  FLEXERIL        Take 1 Tab by mouth 3 times a day as needed for Mild Pain.   Dose:  10 mg                        fluticasone 50 MCG/ACT nasal spray   Commonly known as:  FLONASE        Spray 1 Spray in nose every day.   Dose:  1 Spray                        gabapentin 300 MG Caps   Commonly known as:  NEURONTIN        Take 2 tabs by mouth every morning and 3 tabs every evening                        lisinopril-hydrochlorothiazide 10-12.5 MG per tablet   Commonly known as:  PRINZIDE, ZESTORETIC        Take 1 Tab by mouth every day.   Dose:  1 Tab                        omeprazole 20 MG delayed-release capsule   Commonly known as:  PRILOSEC        TAKE ONE CAPSULE BY MOUTH TWICE DAILY                             Where to Get Your Medications      You can get these medications from any pharmacy     Bring a paper prescription for each of these medications    - nitrofurantoin monohydr macro 100 MG Caps  - ondansetron 4 MG Tbdp            Procedures and tests performed during  your visit     CBC WITH DIFFERENTIAL    COMP METABOLIC PANEL    CONSENT FOR CONTRAST INJECTION    CT-ABDOMEN-PELVIS WITH    EKG (NOW)    ESTIMATED GFR    IV Saline Lock    LIPASE    TROPONIN    URINALYSIS CULTURE, IF INDICATED    URINE CULTURE(NEW)    URINE MICROSCOPIC (W/UA)        Discharge Instructions       Urinary Tract Infection  A urinary tract infection (UTI) can occur any place along the urinary tract. The tract includes the kidneys, ureters, bladder, and urethra. A type of germ called bacteria often causes a UTI. UTIs are often helped with antibiotic medicine.   HOME CARE   · If given, take antibiotics as told by your doctor. Finish them even if you start to feel better.  · Drink enough fluids to keep your pee (urine) clear or pale yellow.  · Avoid tea, drinks with caffeine, and bubbly (carbonated) drinks.  · Pee often. Avoid holding your pee in for a long time.  · Pee before and after having sex (intercourse).  · Wipe from front to back after you poop (bowel movement) if you are a woman. Use each tissue only once.  GET HELP RIGHT AWAY IF:   · You have back pain.  · You have lower belly (abdominal) pain.  · You have chills.  · You feel sick to your stomach (nauseous).  · You throw up (vomit).  · Your burning or discomfort with peeing does not go away.  · You have a fever.  · Your symptoms are not better in 3 days.  MAKE SURE YOU:   · Understand these instructions.  · Will watch your condition.  · Will get help right away if you are not doing well or get worse.     This information is not intended to replace advice given to you by your health care provider. Make sure you discuss any questions you have with your health care provider.     Document Released: 06/05/2009 Document Revised: 01/08/2016 Document Reviewed: 07/18/2013  [employer] Interactive Patient Education ©2016 [employer]            Patient Information     Patient Information    Following emergency treatment: all patient requiring follow-up care  must return either to a private physician or a clinic if your condition worsens before you are able to obtain further medical attention, please return to the emergency room.     Billing Information    At [facility], we work to make the billing process streamlined for our patients.  Our Representatives are here to answer any questions you may have regarding your hospital bill.  If you have insurance coverage and have supplied your insurance information to us, we will submit a claim to your insurer on your behalf.  Should you have any questions regarding your bill, we can be reached online or by phone as follows:  Online: You are able pay your bills online or live chat with our representatives about any billing questions you may have. We are here to help Monday - Friday from 8:00am to 7:30pm and 9:00am - 12:00pm on Saturdays.  Please visit [email or web address]  for more information.   Phone:  [phone] or [phone]    Please note that your emergency physician, surgeon, pathologist, radiologist, anesthesiologist, and other specialists are not employed by [facility] and will therefore bill separately for their services.  Please contact them directly for any questions concerning their bills at the numbers below:     Emergency Physician Services:  [phone]  [facility]:  [phone]  [facility]:  [phone]  [facility]:  [phone]    1. Your final bill may vary from the amount quoted upon discharge if all procedures are not complete at that time, or if your doctor has additional procedures of which we are not aware. You will receive an additional bill if you return to the Emergency Department at [facility] for suture removal regardless of the facility of which the sutures were placed.     2. Please arrange for settlement of this account at the emergency registration.    3. All self-pay accounts are due in full at the  time of treatment.  If you are unable to meet this obligation then payment is expected within 4-5 days.     4. If you have had radiology studies (CT, X-ray, Ultrasound, MRI), you have received a preliminary result during your emergency department visit. Please contact the radiology department [phone] to receive a copy of your final result. Please discuss the Final result with your primary physician or with the follow up physician provided.     Crisis Hotline:  [place] Crisis Hotline:  5-431-HCRTAQI or 1-972.367.7885  [place] Crisis Hotline:    1-568.933.8067 or 535-736-5440         ED Discharge Follow Up Questions    1. In order to provide you with very good care, we would like to follow up with a phone call in the next few days.  May we have your permission to contact you?     YES /  NO    2. What is the best phone number to call you? (       )_____-__________    3. What is the best time to call you?      Morning  /  Afternoon  /  Evening                   Patient Signature:  ____________________________________________________________    Date:  ____________________________________________________________      Your appointments     Jul 17, 2017  1:00 PM   Established Patient with RUDY Ying   [facility] [place] [address] [address] [phone]           You will be receiving a confirmation call a few days before your appointment from our automated call confirmation system.

## 2017-07-17 NOTE — LETTER
7/20/2017               Hanna Marcelo  [address] [place] [address] [address]  [facility] [address]        Dear Hanna (MR#[id number])    This letter is sent in regards to your, recent visit to the [facility] Emergency Department on 7/17/2017.  During the visit, tests were performed to assist the physician in a medical diagnosis.  A review of those tests requires that we notify you of the following:    Your urine culture was POSITIVE for a bacteria called Escherichia coli. The antibiotic prescribed for you (nitrofurantoin) should be active to treat this bacteria. IT IS IMPORTANT THAT YOU CONTINUE TAKING YOUR ANTIBIOTIC UNTIL IT IS FINISHED.      Please feel free to contact me at the number below if you have any questions or concerns. Thank you for your cooperation in the matter.    Sincerely,  ED Culture Follow-Up Staff  Linda Foote, PharmD    [facility], Emergency Department  [address] [place] [id number]  [phone] (ED Culture Line)  [phone]

## 2017-07-17 NOTE — ED NOTES
"Pt bib chana, c/o GERD. Pt states the last few years she has had an extensive hx of GI pain r/t GERD. She reports N/V/D the last few days, and states this typically occurs with her GERD \"flare-ups.\" Pt had an EKG done by chana that was unremarkable. Labs drawn and sent by this RN. Pt denies chest pain. ERP at bedside.   "

## 2017-07-17 NOTE — ED NOTES
Pt provided with discharge instructions, prescriptions x2, instructions for follow up appointment with PCP who pt states she has an appointment with this afternoon, s/s of when to seek emergency care.  Pt verbalizes understanding.  Pt discharged in good condition.

## 2017-07-17 NOTE — ED AVS SNAPSHOT
7/17/2017    Hanna Marcelo  [address] [place] [address] [address]  [place] [address]    Dear Hanna:    [facility] wants to ensure your discharge home is safe and you or your loved ones have had all of your questions answered regarding your care after you leave the hospital.    Below is a list of resources and contact information should you have any questions regarding your hospital stay, follow-up instructions, or active medical symptoms.    Questions or Concerns Regarding… Contact   Medical Questions Related to Your Discharge  (7 days a week, 8am-5pm) Contact a Nurse Care Coordinator   [phone]   Medical Questions Not Related to Your Discharge  (24 hours a day / 7 days a week)  Contact the Nurse Health Line   [phone]    Medications or Discharge Instructions Refer to your discharge packet   or contact your [facility] Primary Care Provider   [phone]   Follow-up Appointment(s) Schedule your appointment via [employer]   or contact Scheduling [phone]   Billing Review your statement via [employer]  or contact Billing [phone]   Medical Records Review your records via [employer]   or contact Medical Records [phone]     You may receive a telephone call within two days of discharge. This call is to make certain you understand your discharge instructions and have the opportunity to have any questions answered. You can also easily access your medical information, test results and upcoming appointments via the [employer] free online health management tool. You can learn more and sign up at [employer]. For assistance setting up your [employer] account, please call [phone].    Once again, we want to ensure your discharge home is safe and that you have a clear understanding of any next steps in your care. If you have any questions or concerns, please do not hesitate to contact us, we are here for you. Thank you for choosing [facility] for your healthcare needs.    Sincerely,    Your [facility] Healthcare Team

## 2017-07-19 LAB
BACTERIA UR CULT: ABNORMAL
BACTERIA UR CULT: ABNORMAL
SIGNIFICANT IND 70042: ABNORMAL
SITE SITE: ABNORMAL
SOURCE SOURCE: ABNORMAL

## 2017-07-20 LAB — EKG IMPRESSION: NORMAL

## 2017-07-20 NOTE — ED NOTES
ED Positive Culture Follow-up/Notification Note:    Date: 7/20/17     Patient seen in the ED on 7/17/2017 for epigastric abdominal pain and watery nonbloody stool.   1. Acute gastritis without hemorrhage, unspecified gastritis type    2. Acute cystitis with hematuria       Discharge Medication List as of 7/17/2017  9:10 AM      START taking these medications    Details   nitrofurantoin monohydr macro (MACROBID) 100 MG Cap Take 1 Cap by mouth 2 times a day for 7 days., Disp-14 Cap, R-0, Print Rx Paper      ondansetron (ZOFRAN ODT) 4 MG TABLET DISPERSIBLE Take 1 Tab by mouth every 8 hours as needed., Disp-10 Tab, R-0, Print Rx Paper             Allergies: Review of patient's allergies indicates no known allergies.     Final cultures:   Results     Procedure Component Value Units Date/Time    URINE CULTURE(NEW) [[id number]]  (Abnormal)  (Susceptibility) Collected:  07/17/17 0557    Order Status:  Completed Specimen Information:  Urine Updated:  07/19/17 0801     Significant Indicator POS (POS)      Source UR      Site --      Urine Culture Mixed skin ziggy 10-50,000 cfu/mL (A)      Urine Culture -- (A)      Result:        Escherichia coli  ,000 cfu/mL      Culture & Susceptibility     ESCHERICHIA COLI     Antibiotic Sensitivity Microscan Unit Status    Ampicillin Sensitive <=8 mcg/mL Final    Cefepime Sensitive <=8 mcg/mL Final    Cefotaxime Sensitive <=2 mcg/mL Final    Cefotetan Sensitive <=16 mcg/mL Final    Ceftazidime Sensitive <=1 mcg/mL Final    Ceftriaxone Sensitive <=8 mcg/mL Final    Cefuroxime Sensitive <=4 mcg/mL Final    Cephalothin Resistant >16 mcg/mL Final    Ciprofloxacin Sensitive <=1 mcg/mL Final    Gentamicin Sensitive <=4 mcg/mL Final    Levofloxacin Sensitive <=2 mcg/mL Final    Nitrofurantoin Sensitive <=32 mcg/mL Final    Pip/Tazobactam Sensitive <=16 mcg/mL Final    Piperacillin Sensitive <=16 mcg/mL Final    Tigecycline Sensitive <=2 mcg/mL Final    Tobramycin Sensitive <=4 mcg/mL Final     Trimeth/Sulfa Resistant >2/38 mcg/mL Final                       URINALYSIS CULTURE, IF INDICATED [[id number]]  (Abnormal) Collected:  07/17/17 0557    Order Status:  Completed Specimen Information:  Urine Updated:  07/17/17 0811     Micro Urine Req Microscopic      Color Yellow      Character Hazy (A)      Specific Gravity 1.030      Ph 5.0      Glucose Negative mg/dL      Ketones Negative mg/dL      Protein 30 (A) mg/dL      Bilirubin Negative      Nitrite Negative      Leukocyte Esterase Trace (A)      Occult Blood Trace (A)      Culture Indicated Yes UA Culture           Plan:   Appropriate antibiotic therapy prescribed. No changes required based upon culture result.  Sent letter to patient to notify of positive culture result and encourage compliance with prescribed antibiotics.     Linda Foote

## 2017-07-31 ENCOUNTER — OFFICE VISIT (OUTPATIENT)
Dept: MEDICAL GROUP | Facility: PHYSICIAN GROUP | Age: 67
End: 2017-07-31
Payer: MEDICARE

## 2017-07-31 ENCOUNTER — HOSPITAL ENCOUNTER (OUTPATIENT)
Facility: MEDICAL CENTER | Age: 67
End: 2017-07-31
Attending: NURSE PRACTITIONER
Payer: MEDICARE

## 2017-07-31 VITALS
OXYGEN SATURATION: 98 % | HEART RATE: 76 BPM | TEMPERATURE: 97.1 F | HEIGHT: 67 IN | RESPIRATION RATE: 16 BRPM | BODY MASS INDEX: 29.19 KG/M2 | SYSTOLIC BLOOD PRESSURE: 124 MMHG | WEIGHT: 186 LBS | DIASTOLIC BLOOD PRESSURE: 80 MMHG

## 2017-07-31 DIAGNOSIS — E78.2 MIXED HYPERLIPIDEMIA: ICD-10-CM

## 2017-07-31 DIAGNOSIS — K58.9 IRRITABLE BOWEL SYNDROME, UNSPECIFIED TYPE: ICD-10-CM

## 2017-07-31 DIAGNOSIS — I10 ESSENTIAL HYPERTENSION: ICD-10-CM

## 2017-07-31 DIAGNOSIS — M65.331 TRIGGER MIDDLE FINGER OF RIGHT HAND: ICD-10-CM

## 2017-07-31 DIAGNOSIS — R35.0 URINARY FREQUENCY: ICD-10-CM

## 2017-07-31 LAB
APPEARANCE UR: NORMAL
BILIRUB UR STRIP-MCNC: NORMAL MG/DL
COLOR UR AUTO: YELLOW
GLUCOSE UR STRIP.AUTO-MCNC: NORMAL MG/DL
KETONES UR STRIP.AUTO-MCNC: NORMAL MG/DL
LEUKOCYTE ESTERASE UR QL STRIP.AUTO: NORMAL
NITRITE UR QL STRIP.AUTO: NORMAL
PH UR STRIP.AUTO: 5 [PH] (ref 5–8)
PROT UR QL STRIP: NORMAL MG/DL
RBC UR QL AUTO: NORMAL
SP GR UR STRIP.AUTO: 1.03
UROBILINOGEN UR STRIP-MCNC: NORMAL MG/DL

## 2017-07-31 PROCEDURE — 87086 URINE CULTURE/COLONY COUNT: CPT

## 2017-07-31 PROCEDURE — 99214 OFFICE O/P EST MOD 30 MIN: CPT | Performed by: NURSE PRACTITIONER

## 2017-07-31 PROCEDURE — 81002 URINALYSIS NONAUTO W/O SCOPE: CPT | Performed by: NURSE PRACTITIONER

## 2017-07-31 RX ORDER — DICYCLOMINE HYDROCHLORIDE 10 MG/1
10 CAPSULE ORAL
Qty: 60 CAP | Refills: 0 | Status: SHIPPED | OUTPATIENT
Start: 2017-07-31 | End: 2017-09-11

## 2017-07-31 RX ORDER — GEMFIBROZIL 600 MG/1
600 TABLET, FILM COATED ORAL 2 TIMES DAILY
Qty: 90 TAB | Refills: 1 | Status: SHIPPED | OUTPATIENT
Start: 2017-07-31 | End: 2017-11-20 | Stop reason: SDUPTHER

## 2017-07-31 NOTE — MR AVS SNAPSHOT
"        Hanna Crespoin   2017 4:40 PM   Office Visit   MRN: [id number]    Department:  [facility]   Dept Phone:  [phone]    Description:  Female : 1950   Provider:  RUDY Ying           Reason for Visit     Back Pain fv UTI, GERD      Allergies as of 2017     No Known Allergies      You were diagnosed with     Irritable bowel syndrome, unspecified type   [7085087]       Urinary frequency   [788.41.ICD-9-CM]       Trigger middle finger of right hand   [668451]       Essential hypertension   [4786842]       Mixed hyperlipidemia   [272.2.ICD-9-CM]         Vital Signs     Blood Pressure Pulse Temperature Respirations Height Weight    124/80 mmHg 76 36.2 °C (97.1 °F) 16 1.714 m (5' 7.48\") 84.369 kg (186 lb)    Body Mass Index Oxygen Saturation Smoking Status             28.72 kg/m2 98% Former Smoker         Basic Information     Date Of Birth Sex Race Ethnicity Preferred Language    1950 Female White Non- English      Problem List              ICD-10-CM Priority Class Noted - Resolved    Hypertension I10   2014 - Present    Osteoarthritis M19.90   2014 - Present    GERD (gastroesophageal reflux disease) K21.9   2014 - Present    IBS (irritable bowel syndrome) K58.9   2014 - Present    Fibromyalgia M79.7   2014 - Present    Visual changes H53.9   2014 - Present    SOB (shortness of breath) R06.02   2014 - Present    Abnormal CT scan, lung R91.8   2014 - Present    Hyperlipidemia E78.5   3/12/2014 - Present    Muscle spasm M62.838   2014 - Present    Back pain M54.9   2014 - Present    Chronic neck pain M54.2, G89.29   2014 - Present    Chronic pain of both shoulders M25.512, G89.29, M25.511   2014 - Present    Lump of skin of back R22.2   2014 - Present    Neck mass R22.1   2014 - Present    DDD (degenerative disc disease), cervical M50.30   2014 - Present    Right foot pain M79.671   " 9/30/2014 - Present    Obesity (BMI 30.0-34.9) E66.9   9/30/2014 - Present    Mammogram abnormal R92.8   1/14/2015 - Present    Onychomycosis B35.1   4/14/2015 - Present    Eye twitch G24.5   6/30/2015 - Present    Environmental allergies Z91.09   11/3/2015 - Present    Trigger finger of right hand M65.30   8/25/2016 - Present    Radiating back pain M54.9   1/27/2017 - Present    Urinary frequency R35.0   1/27/2017 - Present    Decreased GFR R94.4   1/27/2017 - Present    FH: colon cancer Z80.0   1/27/2017 - Present    Obesity (BMI 30-39.9) E66.9   1/27/2017 - Present      Health Maintenance        Date Due Completion Dates    IMM DTaP/Tdap/Td Vaccine (1 - Tdap) 4/20/1971 ---    BONE DENSITY 4/20/2017 ---    IMM PNEUMOCOCCAL 65+ (ADULT) LOW/MEDIUM RISK SERIES (1 of 2 - PCV13) 4/20/2017 ---    IMM INFLUENZA (1) 9/1/2017 11/1/2016, 10/2/2014    MAMMOGRAM 11/3/2017 11/3/2016, 2/4/2015, 1/6/2015, 1/6/2015 (Prv Comp)    Override on 1/6/2015: Previously completed    COLONOSCOPY 1/9/2021 1/9/2011 (Prv Comp)    Override on 1/9/2011: Previously completed            Results     POCT Urinalysis      Component Value Standard Range & Units    POC Color yellow Negative    POC Appearance cloudy Negative    POC Leukocyte Esterase neg Negative    POC Nitrites neg Negative    POC Urobiligen neg Negative (0.2) mg/dL    POC Protein trace Negative mg/dL    POC Urine PH 5.0 5.0 - 8.0    POC Blood trace Negative    POC Specific Gravity 1.030 <1.005 - >1.030    POC Ketones neg Negative mg/dL    POC Biliruben neg Negative mg/dL    POC Glucose neg Negative mg/dL                        Current Immunizations     Influenza TIV (IM) 10/2/2014    Influenza Vaccine Adult HD 11/1/2016    Pneumococcal polysaccharide vaccine (PPSV-23) 1/10/2014    SHINGLES VACCINE 10/2/2014      Below and/or attached are the medications your provider expects you to take. Review all of your home medications and newly ordered medications with your provider and/or  pharmacist. Follow medication instructions as directed by your provider and/or pharmacist. Please keep your medication list with you and share with your provider. Update the information when medications are discontinued, doses are changed, or new medications (including over-the-counter products) are added; and carry medication information at all times in the event of emergency situations     Allergies:  No Known Allergies          Medications  Valid as of: July 31, 2017 -  5:14 PM    Generic Name Brand Name Tablet Size Instructions for use    Albuterol Sulfate (Aero Soln) albuterol 108 (90 BASE) MCG/ACT Inhale 2 Puffs by mouth every 6 hours as needed for Shortness of Breath.        Celecoxib (Cap) CELEBREX 200 MG TAKE ONE CAPSULE BY MOUTH TWICE DAILY        Cyclobenzaprine HCl (Tab) FLEXERIL 10 MG Take 1 Tab by mouth 3 times a day as needed for Mild Pain.        Dicyclomine HCl (Cap) BENTYL 10 MG Take 1 Cap by mouth 4 Times a Day,Before Meals and at Bedtime.        Fluticasone Propionate (Suspension) FLONASE 50 MCG/ACT Spray 1 Spray in nose every day.        Gabapentin (Cap) NEURONTIN 300 MG Take 2 tabs by mouth every morning and 3 tabs every evening        Gemfibrozil (Tab) LOPID 600 MG Take 1 Tab by mouth 2 times a day.        Lisinopril-Hydrochlorothiazide (Tab) PRINZIDE, ZESTORETIC 10-12.5 MG Take 1 Tab by mouth every day.        Omeprazole (CAPSULE DELAYED RELEASE) PRILOSEC 20 MG TAKE ONE CAPSULE BY MOUTH TWICE DAILY        Ondansetron (TABLET DISPERSIBLE) ZOFRAN ODT 4 MG Take 1 Tab by mouth every 8 hours as needed.        .                 Medicines prescribed today were sent to:     [facility] [place], [place] - [address]    [address] [address]    Phone: [phone] Fax: [phone]    Open 24 Hours?: No      Medication refill instructions:       If your prescription bottle indicates you have medication refills left, it is not necessary to call your provider’s  office. Please contact your pharmacy and they will refill your medication.    If your prescription bottle indicates you do not have any refills left, you may request refills at any time through one of the following ways: The online [email or web address] system (except Urgent Care), by calling your provider’s office, or by asking your pharmacy to contact your provider’s office with a refill request. Medication refills are processed only during regular business hours and may not be available until the next business day. Your provider may request additional information or to have a follow-up visit with you prior to refilling your medication.   *Please Note: Medication refills are assigned a new Rx number when refilled electronically. Your pharmacy may indicate that no refills were authorized even though a new prescription for the same medication is available at the pharmacy. Please request the medicine by name with the pharmacy before contacting your provider for a refill.        Your To Do List     Future Labs/Procedures Complete By Expires    COMP METABOLIC PANEL  As directed 7/31/2018    LIPID PROFILE  As directed 7/31/2018    URINE CULTURE(NEW)  As directed 7/31/2018      Instructions    Reprint referral to gastroenterology      Bentyl 10 mg 1 pill up to 4 times a day    Stay on very bland diet--no carbonated beverages, low residual diet    Stay on Gas X    Make appt with hand doctor to discuss options for trigger finger    Follow up with in 6 weeks, sooner if needed    Start gemfibrozil       Other Notes About Your Plan     Last UDS:  12.17.15             [employer] Access Code: Activation code not generated  Current [employer] Status: Active

## 2017-07-31 NOTE — PATIENT INSTRUCTIONS
Reprint referral to gastroenterology      Bentyl 10 mg 1 pill up to 4 times a day    Stay on very bland diet--no carbonated beverages, low residual diet    Stay on Gas X    Make appt with hand doctor to discuss options for trigger finger    Follow up with in 6 weeks, sooner if needed    Start gemfibrozil

## 2017-08-01 DIAGNOSIS — R35.0 URINARY FREQUENCY: ICD-10-CM

## 2017-08-01 NOTE — ASSESSMENT & PLAN NOTE
This is a chronic condition, stable and usually well controlled on fenofibrate. However insurance will no longer cover this, they require her to be on medication in her formulary. We'll switch her to gemfibrozil 600 mg one pill twice a day. This has been called in to her pharmacy for her. We will recheck lipids in 6 months. She is to continue on healthy low-fat, low-cholesterol diet, regular physical activity and continued efforts towards weight loss.

## 2017-08-01 NOTE — ASSESSMENT & PLAN NOTE
This is a chronic condition, poorly controlled and states it is getting worse. She has been referred to orthopedics/hand surgeon and has been to see them. She was told that the next option would be surgery. I encouraged her to make an appointment for follow-up. I also encouraged her to keep her affected finger is straight by attempting to brace them if possible until she can be seen by specialist.

## 2017-08-01 NOTE — ASSESSMENT & PLAN NOTE
Patient recently seen in hospital for abdominal pain (see additional notes) and burning as well as symptoms of UTI and was treated with antibiotics. CT scan of abdomen was normal. She states symptoms improved but they have returned, mostly consisting of urinary frequency. Urinalysis in office today shows mostly negative UA except for trace of protein and a trace of blood but negative leukocytes and nitrates. We will send this off for culture and I will notify her of results and further actions if needed. I encouraged her to stay well-hydrated, wear cotton underwear, keep urine acidic by drinking cranberry juice or taking cranberry tablets, she can use over-the-counter AZO. Patient verbalized understanding.

## 2017-08-01 NOTE — ASSESSMENT & PLAN NOTE
This is a chronic condition, stable and well-controlled on current regimen including lisinopril-hydrochlorothiazide. Her blood pressure today is 124/80 and she denies symptoms of hypertension. She tolerates medication well with no significant bothersome side effects and she does not need refills at this time. She is due for labs in 6 months, these have been ordered. She is to continue with healthy eating, regular physical activity and continued efforts towards weight loss.

## 2017-08-01 NOTE — ASSESSMENT & PLAN NOTE
Patient continues with diffuse abdominal pain mostly epigastric and more recently to the right lower abdomen. She was recently seen in the emergency room on July 17 in which a workup was completed and found to be essentially negative except for UTI in which she was treated with antibiotics. She states symptoms of UTI have improved with antibiotics except she is now with urinary frequency. We'll send urine off for culture as UA in office was essentially negative except for traces of protein and trace of blood.  She reports that her abdomen continues to be bloated and tender especially to the right lower quadrant. CT scan in the hospital was negative for diverticulitis, appendicitis or any other acute process. She had been taking Gas-X in the past but stopped taking because she has run out of the medication. She does continue on PPI but states that these medications are no longer working for her abdominal pain and burning. In the past she has been referred to gastroenterology but she has not made an appointment. I encouraged her to do so. In the interim I will have her continue on PPI but will add Bentyl, 10 mg up to 4 times a day. I did discuss with her the risks, benefits and side effects of medication. I also encouraged her to stay on bland low residual diet.

## 2017-08-01 NOTE — PROGRESS NOTES
Chief Complaint   Patient presents with   • Back Pain     fv UTI, GERD         This is a 67 y.o.female patient that presents today with the followin-year-old female patient presents today with multiple issues she was to discuss.    Trigger finger of right hand  This is a chronic condition, poorly controlled and states it is getting worse. She has been referred to orthopedics/hand surgeon and has been to see them. She was told that the next option would be surgery. I encouraged her to make an appointment for follow-up. I also encouraged her to keep her affected finger is straight by attempting to brace them if possible until she can be seen by specialist.    Urinary frequency  Patient recently seen in hospital for abdominal pain (see additional notes) and burning as well as symptoms of UTI and was treated with antibiotics. CT scan of abdomen was normal. She states symptoms improved but they have returned, mostly consisting of urinary frequency. Urinalysis in office today shows mostly negative UA except for trace of protein and a trace of blood but negative leukocytes and nitrates. We will send this off for culture and I will notify her of results and further actions if needed. I encouraged her to stay well-hydrated, wear cotton underwear, keep urine acidic by drinking cranberry juice or taking cranberry tablets, she can use over-the-counter AZO. Patient verbalized understanding.    IBS (irritable bowel syndrome)  Patient continues with diffuse abdominal pain mostly epigastric and more recently to the right lower abdomen. She was recently seen in the emergency room on  in which a workup was completed and found to be essentially negative except for UTI in which she was treated with antibiotics. She states symptoms of UTI have improved with antibiotics except she is now with urinary frequency. We'll send urine off for culture as UA in office was essentially negative except for traces of protein and trace of  blood.  She reports that her abdomen continues to be bloated and tender especially to the right lower quadrant. CT scan in the hospital was negative for diverticulitis, appendicitis or any other acute process. She had been taking Gas-X in the past but stopped taking because she has run out of the medication. She does continue on PPI but states that these medications are no longer working for her abdominal pain and burning. In the past she has been referred to gastroenterology but she has not made an appointment. I encouraged her to do so. In the interim I will have her continue on PPI but will add Bentyl, 10 mg up to 4 times a day. I did discuss with her the risks, benefits and side effects of medication. I also encouraged her to stay on bland low residual diet.    Hyperlipidemia  This is a chronic condition, stable and usually well controlled on fenofibrate. However insurance will no longer cover this, they require her to be on medication in her formulary. We'll switch her to gemfibrozil 600 mg one pill twice a day. This has been called in to her pharmacy for her. We will recheck lipids in 6 months. She is to continue on healthy low-fat, low-cholesterol diet, regular physical activity and continued efforts towards weight loss.    Hypertension  This is a chronic condition, stable and well-controlled on current regimen including lisinopril-hydrochlorothiazide. Her blood pressure today is 124/80 and she denies symptoms of hypertension. She tolerates medication well with no significant bothersome side effects and she does not need refills at this time. She is due for labs in 6 months, these have been ordered. She is to continue with healthy eating, regular physical activity and continued efforts towards weight loss.      Admission on 07/17/2017, Discharged on 07/17/2017   Component Date Value   • WBC 07/17/2017 14.0*   • RBC 07/17/2017 4.95    • Hemoglobin 07/17/2017 14.5    • Hematocrit 07/17/2017 43.9    • MCV  07/17/2017 88.7    • MCH 07/17/2017 29.3    • MCHC 07/17/2017 33.0*   • RDW 07/17/2017 44.1    • Platelet Count 07/17/2017 295    • MPV 07/17/2017 9.7    • Neutrophils-Polys 07/17/2017 82.80*   • Lymphocytes 07/17/2017 9.80*   • Monocytes 07/17/2017 5.40    • Eosinophils 07/17/2017 1.00    • Basophils 07/17/2017 0.40    • Immature Granulocytes 07/17/2017 0.60    • Nucleated RBC 07/17/2017 0.00    • Neutrophils (Absolute) 07/17/2017 11.61*   • Lymphs (Absolute) 07/17/2017 1.37    • Monos (Absolute) 07/17/2017 0.76    • Eos (Absolute) 07/17/2017 0.14    • Baso (Absolute) 07/17/2017 0.05    • Immature Granulocytes (a* 07/17/2017 0.08    • NRBC (Absolute) 07/17/2017 0.00    • Sodium 07/17/2017 140    • Potassium 07/17/2017 3.5*   • Chloride 07/17/2017 107    • Co2 07/17/2017 20    • Anion Gap 07/17/2017 13.0*   • Glucose 07/17/2017 101*   • Bun 07/17/2017 24*   • Creatinine 07/17/2017 0.78    • Calcium 07/17/2017 9.4    • AST(SGOT) 07/17/2017 20    • ALT(SGPT) 07/17/2017 12    • Alkaline Phosphatase 07/17/2017 99    • Total Bilirubin 07/17/2017 0.6    • Albumin 07/17/2017 4.2    • Total Protein 07/17/2017 7.4    • Globulin 07/17/2017 3.2    • A-G Ratio 07/17/2017 1.3    • Lipase 07/17/2017 20    • Micro Urine Req 07/17/2017 Microscopic    • Color 07/17/2017 Yellow    • Character 07/17/2017 Hazy*   • Specific Gravity 07/17/2017 1.030    • Ph 07/17/2017 5.0    • Glucose 07/17/2017 Negative    • Ketones 07/17/2017 Negative    • Protein 07/17/2017 30*   • Bilirubin 07/17/2017 Negative    • Nitrite 07/17/2017 Negative    • Leukocyte Esterase 07/17/2017 Trace*   • Occult Blood 07/17/2017 Trace*   • Culture Indicated 07/17/2017 Yes    • Troponin I 07/17/2017 <0.01    • Report 07/17/2017                      Value:[facility].    Test Date:  2017-07-17  Pt Name:    NARCISO CAMARA              Department: ER  MRN:        [id number]                      Room:       [facility]  Gender:     F                             Technician: [id number]  :        1950                   Requested By:CECI CASTRO  Order #:    [id number]                    Reading MD: CECI CASTRO MD    Measurements  Intervals                                Axis  Rate:       89                           P:          47  RI:         152                          QRS:        18  QRSD:       80                           T:          38  QT:         360  QTc:        439    Interpretive Statements  SINUS RHYTHM  BORDERLINE T ABNORMALITIES, ANTERIOR LEADS  Compared to ECG 2013 19:39:42  T-wave abnormality now present  Sinus tachycardia no longer present  Left ventricular hypertrophy no longer present    Electronically Signed On 2017 11:38:15 PDT by CECI CASTRO MD     • GFR If  2017 >60    • GFR If Non  Ameri* 2017 >60    • WBC 2017 5-10*   • RBC 2017 2-5*   • Bacteria 2017 Moderate*   • Epithelial Cells 2017 Few    • Significant Indicator 2017 POS*   • Source 2017 UR    • Urine Culture 2017 Mixed skin ziggy 10-50,000 cfu/mL*   • Urine Culture 2017 *                    Value:Escherichia coli  ,000 cfu/mL           clinical course has been stable    Past Medical History   Diagnosis Date   • GERD (gastroesophageal reflux disease)    • Arthritis    • IBD (inflammatory bowel disease)    • Fibromyalgia    • Hyperlipidemia    • CAD (coronary artery disease)    • Hypertension        Past Surgical History   Procedure Laterality Date   • Cholecystectomy     • Abdominal hysterectomy total     • Tonsillectomy     • Carpal tunnel release         Family History   Problem Relation Age of Onset   • Heart Disease Mother    • Cancer Mother      colorectal   • Heart Disease Father        Review of patient's allergies indicates no known allergies.    Current Outpatient Prescriptions Ordered in [facility]   Medication Sig Dispense Refill   • dicyclomine (BENTYL) 10 MG  "Cap Take 1 Cap by mouth 4 Times a Day,Before Meals and at Bedtime. 60 Cap 0   • gemfibrozil (LOPID) 600 MG Tab Take 1 Tab by mouth 2 times a day. 90 Tab 1   • ondansetron (ZOFRAN ODT) 4 MG TABLET DISPERSIBLE Take 1 Tab by mouth every 8 hours as needed. 10 Tab 0   • gabapentin (NEURONTIN) 300 MG Cap Take 2 tabs by mouth every morning and 3 tabs every evening 450 Cap 0   • lisinopril-hydrochlorothiazide (PRINZIDE, ZESTORETIC) 10-12.5 MG per tablet Take 1 Tab by mouth every day. 90 Tab 1   • omeprazole (PRILOSEC) 20 MG delayed-release capsule TAKE ONE CAPSULE BY MOUTH TWICE DAILY 180 Cap 1   • celecoxib (CELEBREX) 200 MG Cap TAKE ONE CAPSULE BY MOUTH TWICE DAILY 60 Cap 5   • cyclobenzaprine (FLEXERIL) 10 MG Tab Take 1 Tab by mouth 3 times a day as needed for Mild Pain. 30 Tab 1   • fluticasone (FLONASE) 50 MCG/ACT nasal spray Spray 1 Spray in nose every day. 16 g 11   • albuterol (VENTOLIN OR PROVENTIL) 108 (90 BASE) MCG/ACT AERS Inhale 2 Puffs by mouth every 6 hours as needed for Shortness of Breath. 8.5 g 3     No current [facility]-ordered facility-administered medications on file.       Constitutional ROS: No unexpected change in weight, No weakness, No unexplained fevers, sweats, or chills  Pulmonary ROS: No chronic cough, sputum, or hemoptysis, No shortness of breath, No recent change in breathing  Cardiovascular ROS: No chest pain, No edema, No palpitations, Positive for hypertension, controlled  Gastrointestinal ROS: Positive per history of present illness  Musculoskeletal/Extremities ROS: Positive per history of present illness  Neurologic ROS: Normal development, No seizures, No weakness  Genitourinary ROS: Positive per history of present illness    Physical exam:  /80 mmHg  Pulse 76  Temp(Src) 36.2 °C (97.1 °F)  Resp 16  Ht 1.714 m (5' 7.48\")  Wt 84.369 kg (186 lb)  BMI 28.72 kg/m2  SpO2 98%  General Appearance: Older female, alert, no distress, moderately overweight, well-groomed  Skin: Skin color, " texture, turgor normal. No rashes or lesions.  Lungs: negative findings: normal respiratory rate and rhythm, lungs clear to auscultation  Heart: negative. RRR without murmur, gallop, or rubs.  No ectopy.  Abdomen: Abdomen soft, tenderness with palpation to right lower quadrant. BS normal. No masses,  No organomegaly  Musculoskeletal: positive findings: Decreased range of motion to multiple joints and right hand due to pain  Neurologic: intact, oriented, mood appropriate, judgment intact. Cranial nerves II-12 grossly intact    Medical decision making/discussion: Patient here to discuss multiple issues. She is due for labs in 6 months, these have been ordered. Will send her urine off for culture, I will notify her of results and further actions if needed. I will have her start Bentyl, 10 mg one pill up to 4 times a day with meals and at bedtime as needed. She is to make an appointment with hand surgeon for worsening trigger finger symptoms. She is also to make an appointment with gastroenterology for worsening IBS symptoms. In the interim she is to continue on bland low residual diet. We will change medication to gemfibrozil 600 mg one pill twice a day. She is to exercise regularly and continue efforts towards weight loss.    Hanna was seen today for back pain.    Diagnoses and all orders for this visit:    Irritable bowel syndrome, unspecified type  -     dicyclomine (BENTYL) 10 MG Cap; Take 1 Cap by mouth 4 Times a Day,Before Meals and at Bedtime.    Urinary frequency  -     POCT Urinalysis  -     URINE CULTURE(NEW); Future    Trigger middle finger of right hand    Mixed hyperlipidemia  -     LIPID PROFILE; Future  -     COMP METABOLIC PANEL; Future  -     gemfibrozil (LOPID) 600 MG Tab; Take 1 Tab by mouth 2 times a day.    Essential hypertension  -     LIPID PROFILE; Future  -     COMP METABOLIC PANEL; Future          Please note that this dictation was created using voice recognition software. I have made every  reasonable attempt to correct obvious errors, but I expect that there are errors of grammar and possibly content that I did not discover before finalizing the note.

## 2017-08-03 ENCOUNTER — HOSPITAL ENCOUNTER (OUTPATIENT)
Dept: LAB | Facility: MEDICAL CENTER | Age: 67
End: 2017-08-03
Attending: NURSE PRACTITIONER
Payer: MEDICARE

## 2017-08-03 DIAGNOSIS — E78.2 MIXED HYPERLIPIDEMIA: ICD-10-CM

## 2017-08-03 DIAGNOSIS — I10 ESSENTIAL HYPERTENSION: ICD-10-CM

## 2017-08-03 LAB
ALBUMIN SERPL BCP-MCNC: 3.9 G/DL (ref 3.2–4.9)
ALBUMIN/GLOB SERPL: 1.5 G/DL
ALP SERPL-CCNC: 83 U/L (ref 30–99)
ALT SERPL-CCNC: 13 U/L (ref 2–50)
ANION GAP SERPL CALC-SCNC: 8 MMOL/L (ref 0–11.9)
AST SERPL-CCNC: 16 U/L (ref 12–45)
BACTERIA UR CULT: NORMAL
BILIRUB SERPL-MCNC: 0.7 MG/DL (ref 0.1–1.5)
BUN SERPL-MCNC: 19 MG/DL (ref 8–22)
CALCIUM SERPL-MCNC: 9.1 MG/DL (ref 8.5–10.5)
CHLORIDE SERPL-SCNC: 109 MMOL/L (ref 96–112)
CHOLEST SERPL-MCNC: 252 MG/DL (ref 100–199)
CO2 SERPL-SCNC: 23 MMOL/L (ref 20–33)
CREAT SERPL-MCNC: 0.7 MG/DL (ref 0.5–1.4)
GFR SERPL CREATININE-BSD FRML MDRD: >60 ML/MIN/1.73 M 2
GLOBULIN SER CALC-MCNC: 2.6 G/DL (ref 1.9–3.5)
GLUCOSE SERPL-MCNC: 91 MG/DL (ref 65–99)
HDLC SERPL-MCNC: 44 MG/DL
LDLC SERPL CALC-MCNC: 180 MG/DL
POTASSIUM SERPL-SCNC: 3.9 MMOL/L (ref 3.6–5.5)
PROT SERPL-MCNC: 6.5 G/DL (ref 6–8.2)
SIGNIFICANT IND 70042: NORMAL
SODIUM SERPL-SCNC: 140 MMOL/L (ref 135–145)
SOURCE SOURCE: NORMAL
TRIGL SERPL-MCNC: 142 MG/DL (ref 0–149)

## 2017-08-03 PROCEDURE — 36415 COLL VENOUS BLD VENIPUNCTURE: CPT

## 2017-08-03 PROCEDURE — 80053 COMPREHEN METABOLIC PANEL: CPT

## 2017-08-03 PROCEDURE — 80061 LIPID PANEL: CPT

## 2017-08-04 DIAGNOSIS — E78.2 MIXED HYPERLIPIDEMIA: ICD-10-CM

## 2017-08-22 ENCOUNTER — TELEPHONE (OUTPATIENT)
Dept: MEDICAL GROUP | Facility: PHYSICIAN GROUP | Age: 67
End: 2017-08-22

## 2017-08-22 NOTE — TELEPHONE ENCOUNTER
Patient called and stated that she needs her GI referral refaxed to DR. Yee. I have faxed this referral to [phone].

## 2017-09-02 DIAGNOSIS — M15.9 OSTEOARTHRITIS OF MULTIPLE JOINTS, UNSPECIFIED OSTEOARTHRITIS TYPE: ICD-10-CM

## 2017-09-05 RX ORDER — GABAPENTIN 300 MG/1
CAPSULE ORAL
Qty: 450 CAP | Refills: 0 | Status: SHIPPED | OUTPATIENT
Start: 2017-09-05 | End: 2017-09-11 | Stop reason: SDUPTHER

## 2017-09-05 NOTE — TELEPHONE ENCOUNTER
Was the patient seen in the last year in this department? Yes     Does patient have an active prescription for medications requested? No     Received Request Via: Pharmacy      Pt met protocol?: Yes, OV 7/17

## 2017-09-06 ENCOUNTER — TELEPHONE (OUTPATIENT)
Dept: MEDICAL GROUP | Facility: PHYSICIAN GROUP | Age: 67
End: 2017-09-06

## 2017-09-06 NOTE — TELEPHONE ENCOUNTER
Patient called and stated that she is very upset because Dr. Yee has not received her referral. I explained to the patient that I had faxed this referral to Dr. Yee on 8/22/17. I called Dr. Yee's office and have left a voicemail with her CMA to get this sorted out for the patient.

## 2017-09-08 NOTE — TELEPHONE ENCOUNTER
Tracy returned my call and left a voicemail stating that she has tried to contact the patient twice and received no return call. She left a different phone number than I have listed for her. I have left updated phone number with Tracy at Dr. Yee's office. I informed that patient. She is going to let us know if she does not hear from them.

## 2017-09-11 ENCOUNTER — OFFICE VISIT (OUTPATIENT)
Dept: MEDICAL GROUP | Facility: PHYSICIAN GROUP | Age: 67
End: 2017-09-11
Payer: MEDICARE

## 2017-09-11 VITALS
OXYGEN SATURATION: 98 % | HEART RATE: 76 BPM | DIASTOLIC BLOOD PRESSURE: 82 MMHG | BODY MASS INDEX: 28.19 KG/M2 | TEMPERATURE: 97.9 F | SYSTOLIC BLOOD PRESSURE: 132 MMHG | RESPIRATION RATE: 16 BRPM | WEIGHT: 186 LBS | HEIGHT: 68 IN

## 2017-09-11 DIAGNOSIS — K58.9 IRRITABLE BOWEL SYNDROME, UNSPECIFIED TYPE: ICD-10-CM

## 2017-09-11 DIAGNOSIS — M79.7 FIBROMYALGIA: ICD-10-CM

## 2017-09-11 DIAGNOSIS — M15.9 OSTEOARTHRITIS OF MULTIPLE JOINTS, UNSPECIFIED OSTEOARTHRITIS TYPE: ICD-10-CM

## 2017-09-11 DIAGNOSIS — Z23 NEED FOR INFLUENZA VACCINATION: ICD-10-CM

## 2017-09-11 DIAGNOSIS — K21.9 GASTROESOPHAGEAL REFLUX DISEASE, ESOPHAGITIS PRESENCE NOT SPECIFIED: ICD-10-CM

## 2017-09-11 PROCEDURE — 99214 OFFICE O/P EST MOD 30 MIN: CPT | Mod: 25 | Performed by: NURSE PRACTITIONER

## 2017-09-11 PROCEDURE — G0008 ADMIN INFLUENZA VIRUS VAC: HCPCS | Performed by: NURSE PRACTITIONER

## 2017-09-11 PROCEDURE — 90662 IIV NO PRSV INCREASED AG IM: CPT | Performed by: NURSE PRACTITIONER

## 2017-09-11 RX ORDER — GABAPENTIN 300 MG/1
CAPSULE ORAL
Qty: 450 CAP | Refills: 3 | Status: SHIPPED | OUTPATIENT
Start: 2017-09-11 | End: 2017-12-11 | Stop reason: SDUPTHER

## 2017-09-11 NOTE — PATIENT INSTRUCTIONS
Keep your appt this Wednesday with Dr. Yee    Have lipid profile done February    Stay on all medications as prescribed    Flu shot today

## 2017-09-12 NOTE — PROGRESS NOTES
"Chief Complaint   Patient presents with   • Diarrhea     fv, med refills         This is a 67 y.o.female patient that presents today with the following:Follow-up visit    IBS (irritable bowel syndrome)  Patient here for follow-up visit on diffuse abdominal pain, IBS and reflux. At her last visit we discussed dietary modifications, she was also started on Bentyl 10 mg, one pill up to 4 times a day as needed. Patient had been seen in the emergency room a couple weeks before her last visit for diffuse abdominal pain especially to epigastric and right lower quadrant as well as urinary frequency. She was diagnosed with UTI, CT scan of abdomen was within normal limits. She was treated for UTI in which symptoms improved but she still had urinary frequency after completing antibiotics. UA in office at her last visit was within normal limits.     Upon entering room, patient verbalized that she was very upset that she still has not gotten in to gastroenterology. Referral was faxed to Dr. Yee, gastroenterologist on August 22 as she had decided this is who she wanted to see and call office requesting we fax referral to this office. This was done the very same day. Unfortunately, Dr. Yee's office reports that they did not receive it and it was refaxed again.    Patient continues to have diffuse abdominal pain, gas, bloating. She states Bentyl did not work at all for her. She has stopped taking Gas-X because she \"ran out of it.\" I did remind her that this is over-the-counter and it was helping she can start to continue taking this. I did suggest we test her for H. pylori, but she is not willing to come off of omeprazole for 2 weeks to get an accurate result via stool sample. Pt has upcoming appt with GI this week and she was encouraged to continue on PPI's as well as dietary changes as discussed at previous visit. She will get flu shot today.    Fibromyalgia  Pt has fibromyalgia and osteoarthritis of multiple joints for " which she takes gabapentin 3 times a day. She states that she has no more refills, she was reassured that a year's supply was called in June to her pharmacy. Patient was adamant the pharmacy told her she had no more refills. She was also given a 90 day supply in early September which she has picked up the state she needs additional refills. A year refill was called in again today for her. She is to take this as prescribed.      No visits with results within 1 Month(s) from this visit.   Latest known visit with results is:   Hospital Outpatient Visit on 08/03/2017   Component Date Value   • Cholesterol,Tot 08/03/2017 252*   • Triglycerides 08/03/2017 142    • HDL 08/03/2017 44    • LDL 08/03/2017 180*   • Sodium 08/03/2017 140    • Potassium 08/03/2017 3.9    • Chloride 08/03/2017 109    • Co2 08/03/2017 23    • Anion Gap 08/03/2017 8.0    • Glucose 08/03/2017 91    • Bun 08/03/2017 19    • Creatinine 08/03/2017 0.70    • Calcium 08/03/2017 9.1    • AST(SGOT) 08/03/2017 16    • ALT(SGPT) 08/03/2017 13    • Alkaline Phosphatase 08/03/2017 83    • Total Bilirubin 08/03/2017 0.7    • Albumin 08/03/2017 3.9    • Total Protein 08/03/2017 6.5    • Globulin 08/03/2017 2.6    • A-G Ratio 08/03/2017 1.5    • GFR If  08/03/2017 >60    • GFR If Non  Ameri* 08/03/2017 >60          clinical course has been stable    Past Medical History:   Diagnosis Date   • Arthritis    • CAD (coronary artery disease)    • Fibromyalgia    • GERD (gastroesophageal reflux disease)    • Hyperlipidemia    • Hypertension    • IBD (inflammatory bowel disease)        Past Surgical History:   Procedure Laterality Date   • ABDOMINAL HYSTERECTOMY TOTAL     • CARPAL TUNNEL RELEASE     • CHOLECYSTECTOMY     • TONSILLECTOMY         Family History   Problem Relation Age of Onset   • Heart Disease Mother    • Cancer Mother      colorectal   • Heart Disease Father        Review of patient's allergies indicates no known  "allergies.    Current Outpatient Prescriptions Ordered in [facility]   Medication Sig Dispense Refill   • gabapentin (NEURONTIN) 300 MG Cap TAKE TWO CAPSULES BY MOUTH ONCE DAILY IN THE MORNING AND THREE CAPSULES IN THE EVENING 450 Cap 3   • gemfibrozil (LOPID) 600 MG Tab Take 1 Tab by mouth 2 times a day. 90 Tab 1   • ondansetron (ZOFRAN ODT) 4 MG TABLET DISPERSIBLE Take 1 Tab by mouth every 8 hours as needed. 10 Tab 0   • lisinopril-hydrochlorothiazide (PRINZIDE, ZESTORETIC) 10-12.5 MG per tablet Take 1 Tab by mouth every day. 90 Tab 1   • omeprazole (PRILOSEC) 20 MG delayed-release capsule TAKE ONE CAPSULE BY MOUTH TWICE DAILY 180 Cap 1   • celecoxib (CELEBREX) 200 MG Cap TAKE ONE CAPSULE BY MOUTH TWICE DAILY 60 Cap 5   • cyclobenzaprine (FLEXERIL) 10 MG Tab Take 1 Tab by mouth 3 times a day as needed for Mild Pain. 30 Tab 1   • fluticasone (FLONASE) 50 MCG/ACT nasal spray Spray 1 Spray in nose every day. 16 g 11   • albuterol (VENTOLIN OR PROVENTIL) 108 (90 BASE) MCG/ACT AERS Inhale 2 Puffs by mouth every 6 hours as needed for Shortness of Breath. 8.5 g 3     No current [facility]-ordered facility-administered medications on file.        Constitutional ROS: No unexpected change in weight, No weakness, No unexplained fevers, sweats, or chills  Pulmonary ROS: No chronic cough, sputum, or hemoptysis, No shortness of breath, No recent change in breathing  Cardiovascular ROS: No chest pain, No edema, No palpitations, Positive for hypertension, controlled  Gastrointestinal ROS: Positive per history of present illness  Musculoskeletal/Extremities ROS: Positive for chronic pain in multiple joints, per history of present illness  Neurologic ROS: Normal development, No seizures, No weakness    Physical exam:  /82   Pulse 76   Temp 36.6 °C (97.9 °F)   Resp 16   Ht 1.715 m (5' 7.5\")   Wt 84.4 kg (186 lb)   SpO2 98%   BMI 28.70 kg/m²   General Appearance: Middle-aged older female, alert, no distress, moderately overweight, " well-groomed  Skin: Skin color, texture, turgor normal. No rashes or lesions.  Lungs: negative findings: normal respiratory rate and rhythm, lungs clear to auscultation  Heart: negative. RRR without murmur, gallop, or rubs.  No ectopy.  Abdomen: Abdomen soft, non-tender. BS normal. No masses,  No organomegaly  Musculoskeletal: negative findings: no evidence of joint instability, strength normal, no deformities present  Neurologic: intact, oriented, appropriate, judgment intact. Cranial nerves II-12 grossly intact    Medical decision making/discussion: Patient to continue medications and dietary changes as discussed. She is to keep her upcoming appointment with gastroenterology this week. She will get flu shot today she is to follow-up in February with lab done before visit. She is encouraged to continue with healthy diet, regular physical activity and continued her for stress weight loss.    Hanna was seen today for diarrhea.    Diagnoses and all orders for this visit:    Irritable bowel syndrome, unspecified type    Gastroesophageal reflux disease, esophagitis presence not specified    Osteoarthritis of multiple joints, unspecified osteoarthritis type  -     gabapentin (NEURONTIN) 300 MG Cap; TAKE TWO CAPSULES BY MOUTH ONCE DAILY IN THE MORNING AND THREE CAPSULES IN THE EVENING    Fibromyalgia    Need for influenza vaccination  -     INFLUENZA VACCINE, HIGH DOSE (65+ ONLY)          Please note that this dictation was created using voice recognition software. I have made every reasonable attempt to correct obvious errors, but I expect that there are errors of grammar and possibly content that I did not discover before finalizing the note.

## 2017-09-12 NOTE — ASSESSMENT & PLAN NOTE
"Patient here for follow-up visit on diffuse abdominal pain, IBS and reflux. At her last visit we discussed dietary modifications, she was also started on Bentyl 10 mg, one pill up to 4 times a day as needed. Patient had been seen in the emergency room a couple weeks before her last visit for diffuse abdominal pain especially to epigastric and right lower quadrant as well as urinary frequency. She was diagnosed with UTI, CT scan of abdomen was within normal limits. She was treated for UTI in which symptoms improved but she still had urinary frequency after completing antibiotics. UA in office at her last visit was within normal limits.     Upon entering room, patient verbalized that she was very upset that she still has not gotten in to gastroenterology. Referral was faxed to Dr. Yee, gastroenterologist on August 22 as she had decided this is who she wanted to see and call office requesting we fax referral to this office. This was done the very same day. Unfortunately, Dr. Yee's office reports that they did not receive it and it was refaxed again.    Patient continues to have diffuse abdominal pain, gas, bloating. She states Bentyl did not work at all for her. She has stopped taking Gas-X because she \"ran out of it.\" I did remind her that this is over-the-counter and it was helping she can start to continue taking this. I did suggest we test her for H. pylori, but she is not willing to come off of omeprazole for 2 weeks to get an accurate result via stool sample. Pt has upcoming appt with GI this week and she was encouraged to continue on PPI's as well as dietary changes as discussed at previous visit. She will get flu shot today.  "

## 2017-09-12 NOTE — ASSESSMENT & PLAN NOTE
Pt has fibromyalgia and osteoarthritis of multiple joints for which she takes gabapentin 3 times a day. She states that she has no more refills, she was reassured that a year's supply was called in June to her pharmacy. Patient was adamant the pharmacy told her she had no more refills. She was also given a 90 day supply in early September which she has picked up the state she needs additional refills. A year refill was called in again today for her. She is to take this as prescribed.

## 2017-10-04 DIAGNOSIS — I10 ESSENTIAL HYPERTENSION: ICD-10-CM

## 2017-10-04 RX ORDER — CELECOXIB 200 MG/1
CAPSULE ORAL
Refills: 5 | Status: CANCELLED | OUTPATIENT
Start: 2017-10-04

## 2017-10-05 RX ORDER — LISINOPRIL AND HYDROCHLOROTHIAZIDE 12.5; 1 MG/1; MG/1
TABLET ORAL
Qty: 90 TAB | Refills: 0 | Status: CANCELLED | OUTPATIENT
Start: 2017-10-05

## 2017-10-05 RX ORDER — CELECOXIB 200 MG/1
CAPSULE ORAL
Qty: 60 CAP | Refills: 0 | Status: CANCELLED | OUTPATIENT
Start: 2017-10-05

## 2017-10-05 NOTE — TELEPHONE ENCOUNTER
Was the patient seen in the last year in this department? Yes     Does patient have an active prescription for medications requested? No     Received Request Via: Pharmacy      Pt met protocol?: Yes pt last ov 9/2017   BP Readings from Last 1 Encounters:   09/11/17 132/82

## 2017-10-06 DIAGNOSIS — K21.9 GASTROESOPHAGEAL REFLUX DISEASE WITHOUT ESOPHAGITIS: ICD-10-CM

## 2017-10-06 DIAGNOSIS — I10 ESSENTIAL HYPERTENSION: ICD-10-CM

## 2017-10-06 RX ORDER — OMEPRAZOLE 20 MG/1
CAPSULE, DELAYED RELEASE ORAL
Qty: 180 CAP | Refills: 1 | Status: SHIPPED | OUTPATIENT
Start: 2017-10-06 | End: 2018-02-12 | Stop reason: SDUPTHER

## 2017-10-06 RX ORDER — LISINOPRIL AND HYDROCHLOROTHIAZIDE 12.5; 1 MG/1; MG/1
1 TABLET ORAL
Qty: 90 TAB | Refills: 1 | Status: SHIPPED | OUTPATIENT
Start: 2017-10-06 | End: 2018-02-12 | Stop reason: SDUPTHER

## 2017-10-06 RX ORDER — CELECOXIB 200 MG/1
CAPSULE ORAL
Qty: 180 CAP | Refills: 1 | Status: SHIPPED | OUTPATIENT
Start: 2017-10-06 | End: 2018-02-12 | Stop reason: SDUPTHER

## 2017-10-06 NOTE — TELEPHONE ENCOUNTER
Refill X 6 months, sent to pharmacy.Pt. Seen in the last 6 months per protocol.   Lab Results   Component Value Date/Time    SODIUM 140 08/03/2017 07:55 AM    POTASSIUM 3.9 08/03/2017 07:55 AM    CHLORIDE 109 08/03/2017 07:55 AM    CO2 23 08/03/2017 07:55 AM    GLUCOSE 91 08/03/2017 07:55 AM    BUN 19 08/03/2017 07:55 AM    CREATININE 0.70 08/03/2017 07:55 AM

## 2017-11-10 ENCOUNTER — HOSPITAL ENCOUNTER (OUTPATIENT)
Dept: RADIOLOGY | Facility: MEDICAL CENTER | Age: 67
End: 2017-11-10
Attending: NURSE PRACTITIONER
Payer: MEDICARE

## 2017-11-10 DIAGNOSIS — Z12.31 VISIT FOR SCREENING MAMMOGRAM: ICD-10-CM

## 2017-11-10 PROCEDURE — G0202 SCR MAMMO BI INCL CAD: HCPCS

## 2017-11-20 DIAGNOSIS — E78.2 MIXED HYPERLIPIDEMIA: ICD-10-CM

## 2017-11-21 RX ORDER — GEMFIBROZIL 600 MG/1
TABLET, FILM COATED ORAL
Qty: 180 TAB | Refills: 0 | Status: SHIPPED | OUTPATIENT
Start: 2017-11-21 | End: 2018-02-12 | Stop reason: SDUPTHER

## 2017-11-21 NOTE — TELEPHONE ENCOUNTER
Was the patient seen in the last year in this department? Yes     Does patient have an active prescription for medications requested? No     Received Request Via: Pharmacy      Pt met protocol?: Yes, lipid labs 8/17 ldl up, tot cholest up, ov 9/17

## 2017-12-08 DIAGNOSIS — M62.838 MUSCLE SPASM: ICD-10-CM

## 2017-12-08 RX ORDER — CYCLOBENZAPRINE HCL 10 MG
TABLET ORAL
Qty: 30 TAB | Refills: 0 | Status: SHIPPED | OUTPATIENT
Start: 2017-12-08 | End: 2018-02-12 | Stop reason: SDUPTHER

## 2017-12-11 DIAGNOSIS — M15.9 OSTEOARTHRITIS OF MULTIPLE JOINTS, UNSPECIFIED OSTEOARTHRITIS TYPE: ICD-10-CM

## 2017-12-11 RX ORDER — GABAPENTIN 300 MG/1
CAPSULE ORAL
Qty: 450 CAP | Refills: 0 | Status: SHIPPED | OUTPATIENT
Start: 2017-12-11 | End: 2018-02-12 | Stop reason: SDUPTHER

## 2018-02-12 ENCOUNTER — OFFICE VISIT (OUTPATIENT)
Dept: MEDICAL GROUP | Facility: PHYSICIAN GROUP | Age: 68
End: 2018-02-12
Payer: MEDICARE

## 2018-02-12 VITALS
RESPIRATION RATE: 16 BRPM | HEART RATE: 84 BPM | WEIGHT: 200 LBS | TEMPERATURE: 98.1 F | BODY MASS INDEX: 30.31 KG/M2 | HEIGHT: 68 IN | SYSTOLIC BLOOD PRESSURE: 128 MMHG | OXYGEN SATURATION: 97 % | DIASTOLIC BLOOD PRESSURE: 78 MMHG

## 2018-02-12 DIAGNOSIS — Z91.09 ENVIRONMENTAL ALLERGIES: ICD-10-CM

## 2018-02-12 DIAGNOSIS — R07.89 CHEST PRESSURE: ICD-10-CM

## 2018-02-12 DIAGNOSIS — J32.0 CHRONIC MAXILLARY SINUSITIS: ICD-10-CM

## 2018-02-12 DIAGNOSIS — E78.2 MIXED HYPERLIPIDEMIA: ICD-10-CM

## 2018-02-12 DIAGNOSIS — R53.82 CHRONIC FATIGUE: ICD-10-CM

## 2018-02-12 DIAGNOSIS — H53.9 VISUAL CHANGES: ICD-10-CM

## 2018-02-12 DIAGNOSIS — H43.391 FLOATERS IN VISUAL FIELD, RIGHT: ICD-10-CM

## 2018-02-12 DIAGNOSIS — K21.9 GASTROESOPHAGEAL REFLUX DISEASE WITHOUT ESOPHAGITIS: ICD-10-CM

## 2018-02-12 DIAGNOSIS — Z23 NEED FOR VACCINATION: ICD-10-CM

## 2018-02-12 DIAGNOSIS — K44.9 HIATAL HERNIA: ICD-10-CM

## 2018-02-12 DIAGNOSIS — K21.9 GASTROESOPHAGEAL REFLUX DISEASE, ESOPHAGITIS PRESENCE NOT SPECIFIED: ICD-10-CM

## 2018-02-12 DIAGNOSIS — M62.838 MUSCLE SPASM: ICD-10-CM

## 2018-02-12 DIAGNOSIS — M15.9 OSTEOARTHRITIS OF MULTIPLE JOINTS, UNSPECIFIED OSTEOARTHRITIS TYPE: ICD-10-CM

## 2018-02-12 DIAGNOSIS — I10 ESSENTIAL HYPERTENSION: ICD-10-CM

## 2018-02-12 PROCEDURE — 99214 OFFICE O/P EST MOD 30 MIN: CPT | Mod: 25 | Performed by: FAMILY MEDICINE

## 2018-02-12 PROCEDURE — 90670 PCV13 VACCINE IM: CPT | Performed by: FAMILY MEDICINE

## 2018-02-12 PROCEDURE — G0009 ADMIN PNEUMOCOCCAL VACCINE: HCPCS | Performed by: FAMILY MEDICINE

## 2018-02-12 RX ORDER — LISINOPRIL AND HYDROCHLOROTHIAZIDE 12.5; 1 MG/1; MG/1
1 TABLET ORAL
Qty: 90 TAB | Refills: 3 | Status: SHIPPED | OUTPATIENT
Start: 2018-02-12 | End: 2018-05-17

## 2018-02-12 RX ORDER — OMEPRAZOLE 20 MG/1
CAPSULE, DELAYED RELEASE ORAL
Qty: 180 CAP | Refills: 3 | Status: SHIPPED | OUTPATIENT
Start: 2018-02-12 | End: 2019-05-04 | Stop reason: SDUPTHER

## 2018-02-12 RX ORDER — CYCLOBENZAPRINE HCL 10 MG
TABLET ORAL
Qty: 90 TAB | Refills: 3 | Status: SHIPPED | OUTPATIENT
Start: 2018-02-12 | End: 2019-10-30 | Stop reason: SDUPTHER

## 2018-02-12 RX ORDER — GABAPENTIN 300 MG/1
CAPSULE ORAL
Qty: 450 CAP | Refills: 3 | Status: SHIPPED | OUTPATIENT
Start: 2018-02-12 | End: 2019-02-20 | Stop reason: SDUPTHER

## 2018-02-12 RX ORDER — GEMFIBROZIL 600 MG/1
TABLET, FILM COATED ORAL
Qty: 180 TAB | Refills: 3 | Status: SHIPPED | OUTPATIENT
Start: 2018-02-12 | End: 2018-08-22

## 2018-02-12 RX ORDER — FLUTICASONE PROPIONATE 50 MCG
1 SPRAY, SUSPENSION (ML) NASAL DAILY
Qty: 16 G | Refills: 11 | Status: ON HOLD | OUTPATIENT
Start: 2018-02-12 | End: 2018-07-31

## 2018-02-12 RX ORDER — CELECOXIB 200 MG/1
CAPSULE ORAL
Qty: 180 CAP | Refills: 3 | Status: SHIPPED | OUTPATIENT
Start: 2018-02-12 | End: 2019-04-03 | Stop reason: SDUPTHER

## 2018-02-12 ASSESSMENT — PATIENT HEALTH QUESTIONNAIRE - PHQ9: CLINICAL INTERPRETATION OF PHQ2 SCORE: 0

## 2018-02-12 NOTE — ASSESSMENT & PLAN NOTE
Since the flu 2 months ago patient notes increased fatigue, tiredness, short of breath.   She had a normal [place] cardiac stress test in 2013, she had a stable chest CT.   She also notes dizziness where she has to stop, hold onto something, lasts for a few minutes and she is tired afterwards. She is fully conscious, feels woozy and then it goes away.   She notes chronic allergies: uses claritin, flonase  Will check echo  She had normal cbc, cmp

## 2018-02-12 NOTE — ASSESSMENT & PLAN NOTE
She has chronic vision changes and had numerous evaluations with eye specialists.   She recently had sharp pain in right eye followed by bright flashes of light and now with a floater.   Will refer to ophthalmology urgently. Advised on er precautions.

## 2018-02-12 NOTE — ASSESSMENT & PLAN NOTE
Severe reflux, with hoarseness of voice, can't sleep laying down as the reflux wakes her up  Will refer to surgery for treatment of hiatal hernia  Continues on prilosec 20 mg bid and ganvescon

## 2018-02-22 ENCOUNTER — APPOINTMENT (OUTPATIENT)
Dept: SCHEDULING | Facility: IMAGING CENTER | Age: 68
End: 2018-02-22
Payer: MEDICARE

## 2018-02-23 ENCOUNTER — APPOINTMENT (OUTPATIENT)
Dept: CARDIOLOGY | Facility: MEDICAL CENTER | Age: 68
End: 2018-02-23
Payer: MEDICARE

## 2018-03-01 ENCOUNTER — TELEPHONE (OUTPATIENT)
Dept: MEDICAL GROUP | Facility: PHYSICIAN GROUP | Age: 68
End: 2018-03-01

## 2018-03-01 NOTE — TELEPHONE ENCOUNTER
1. Caller Name: Hanna                                         Call Back Number: [phone] (home)       Patient approves a detailed voicemail message: no    Asking for refill Lopid, glenroy approved 2/12. Contacted [facility], RX ready for .  Hanna informed

## 2018-03-06 ENCOUNTER — HOSPITAL ENCOUNTER (OUTPATIENT)
Dept: CARDIOLOGY | Facility: MEDICAL CENTER | Age: 68
End: 2018-03-06
Attending: FAMILY MEDICINE
Payer: MEDICARE

## 2018-03-06 VITALS — HEIGHT: 68 IN | WEIGHT: 199.96 LBS | BODY MASS INDEX: 30.31 KG/M2

## 2018-03-06 LAB — LV EJECT FRACT  99904: 65

## 2018-03-06 PROCEDURE — 700101 HCHG RX REV CODE 250

## 2018-03-06 PROCEDURE — 700111 HCHG RX REV CODE 636 W/ 250 OVERRIDE (IP)

## 2018-03-06 PROCEDURE — 93350 STRESS TTE ONLY: CPT

## 2018-03-06 PROCEDURE — 93350 STRESS TTE ONLY: CPT | Mod: 26 | Performed by: INTERNAL MEDICINE

## 2018-03-06 RX ORDER — LABETALOL HYDROCHLORIDE 5 MG/ML
INJECTION, SOLUTION INTRAVENOUS
Status: DISCONTINUED
Start: 2018-03-06 | End: 2018-03-07 | Stop reason: HOSPADM

## 2018-04-06 ENCOUNTER — TELEPHONE (OUTPATIENT)
Dept: MEDICAL GROUP | Facility: PHYSICIAN GROUP | Age: 68
End: 2018-04-06

## 2018-04-06 NOTE — TELEPHONE ENCOUNTER
VOICEMAIL  1. Caller Name: Hanna                      Call Back Number: [phone] (home)     2. Message: Hanna requesting results of her Upper GI completed on 3/22. Report scanned in media    3. Patient approves office to leave a detailed voicemail/MyChart message: no    Patient has my chart

## 2018-04-11 NOTE — TELEPHONE ENCOUNTER
MICHELLE Villarreal, Med Ass't   Caller: Unspecified (5 days ago,  3:50 PM)             Results sent to patient via [employer].   Fernando Irwin M.D.

## 2018-05-14 ENCOUNTER — HOSPITAL ENCOUNTER (OUTPATIENT)
Dept: RADIOLOGY | Facility: MEDICAL CENTER | Age: 68
End: 2018-05-14
Attending: SURGERY
Payer: MEDICARE

## 2018-05-14 DIAGNOSIS — S27.809A RUPTURE OF DIAPHRAGM: ICD-10-CM

## 2018-05-14 PROCEDURE — 700112 HCHG RX REV CODE 229: Performed by: SURGERY

## 2018-05-14 PROCEDURE — 74241 DX-UPPER GI-SERIES WITH KUB: CPT

## 2018-05-14 PROCEDURE — A9270 NON-COVERED ITEM OR SERVICE: HCPCS | Performed by: SURGERY

## 2018-05-14 RX ADMIN — ANTACID/ANTIFLATULENT 1 PACKET: 380; 550; 10; 10 GRANULE, EFFERVESCENT ORAL at 10:00

## 2018-05-17 ENCOUNTER — HOSPITAL ENCOUNTER (OUTPATIENT)
Dept: LAB | Facility: MEDICAL CENTER | Age: 68
End: 2018-05-17
Attending: FAMILY MEDICINE
Payer: MEDICARE

## 2018-05-17 ENCOUNTER — OFFICE VISIT (OUTPATIENT)
Dept: MEDICAL GROUP | Facility: PHYSICIAN GROUP | Age: 68
End: 2018-05-17
Payer: MEDICARE

## 2018-05-17 VITALS
HEART RATE: 86 BPM | BODY MASS INDEX: 30.77 KG/M2 | TEMPERATURE: 97.6 F | SYSTOLIC BLOOD PRESSURE: 118 MMHG | OXYGEN SATURATION: 98 % | RESPIRATION RATE: 16 BRPM | DIASTOLIC BLOOD PRESSURE: 64 MMHG | WEIGHT: 203 LBS | HEIGHT: 68 IN

## 2018-05-17 DIAGNOSIS — L08.9 SKIN INFECTION: ICD-10-CM

## 2018-05-17 DIAGNOSIS — E66.9 OBESITY (BMI 30.0-34.9): ICD-10-CM

## 2018-05-17 DIAGNOSIS — Z78.0 MENOPAUSE: ICD-10-CM

## 2018-05-17 DIAGNOSIS — E55.9 VITAMIN D DEFICIENCY: ICD-10-CM

## 2018-05-17 DIAGNOSIS — R06.02 SOB (SHORTNESS OF BREATH): ICD-10-CM

## 2018-05-17 DIAGNOSIS — R53.82 CHRONIC FATIGUE: ICD-10-CM

## 2018-05-17 DIAGNOSIS — Z23 NEED FOR VACCINATION: ICD-10-CM

## 2018-05-17 DIAGNOSIS — K58.9 IRRITABLE BOWEL SYNDROME, UNSPECIFIED TYPE: ICD-10-CM

## 2018-05-17 DIAGNOSIS — Z13.820 SCREENING FOR OSTEOPOROSIS: ICD-10-CM

## 2018-05-17 LAB
25(OH)D3 SERPL-MCNC: 14 NG/ML (ref 30–100)
TSH SERPL DL<=0.005 MIU/L-ACNC: 0.95 UIU/ML (ref 0.38–5.33)

## 2018-05-17 PROCEDURE — 82306 VITAMIN D 25 HYDROXY: CPT

## 2018-05-17 PROCEDURE — 84443 ASSAY THYROID STIM HORMONE: CPT

## 2018-05-17 PROCEDURE — 99214 OFFICE O/P EST MOD 30 MIN: CPT | Mod: 25 | Performed by: FAMILY MEDICINE

## 2018-05-17 PROCEDURE — 90471 IMMUNIZATION ADMIN: CPT | Performed by: FAMILY MEDICINE

## 2018-05-17 PROCEDURE — 90715 TDAP VACCINE 7 YRS/> IM: CPT | Performed by: FAMILY MEDICINE

## 2018-05-17 PROCEDURE — 36415 COLL VENOUS BLD VENIPUNCTURE: CPT

## 2018-05-17 RX ORDER — HYDROCHLOROTHIAZIDE 12.5 MG/1
12.5 CAPSULE, GELATIN COATED ORAL DAILY
Qty: 90 CAP | Refills: 3 | Status: SHIPPED | OUTPATIENT
Start: 2018-05-17 | End: 2019-04-08

## 2018-05-17 RX ORDER — FLUTICASONE PROPIONATE 220 UG/1
1 AEROSOL, METERED RESPIRATORY (INHALATION) 2 TIMES DAILY
Qty: 1 INHALER | Refills: 11 | Status: ON HOLD | OUTPATIENT
Start: 2018-05-17 | End: 2018-07-31

## 2018-05-17 RX ORDER — SULFAMETHOXAZOLE AND TRIMETHOPRIM 400; 80 MG/1; MG/1
1 TABLET ORAL 2 TIMES DAILY
Qty: 14 TAB | Refills: 0 | Status: SHIPPED | OUTPATIENT
Start: 2018-05-17 | End: 2018-05-24

## 2018-05-17 NOTE — PROGRESS NOTES
Subjective:   Hanna Marcelo is a 68 y.o. female here today for evaluation and management of:     Chronic fatigue  Since the flu in dec, pt noted increased fatigue, tiredness, shortness of breath  She had a normal cardiac stress test in 2013 and chest CT with only haital hernia: has appt with GI/surgery for the hiatal hernia.   Echo was checked and normal  Normal cbc, cmp  Will check TSH though it was normal in 2013 and 2014        Skin infection  Boils in inguinal crease. Large, tender, red, when they pop, they have thick dark blood.  Sometimes yellow pus.       SOB (shortness of breath)  Normal chest CT except hiatal hernia  Uses an inhaler: will start inhaled steroid  Echo normal.     IBS (irritable bowel syndrome)  She has been feeling more bloated (had total hysterectomy in the past.   She feel very gassy, used to take a lot of gasex in the past. Will try gasex. She had an upper and lower GI scope done, has follow up with GI for hiatal hernia repair.   She is on omeprazole, was given a different rx by her gastroenterologist but insurance did not cover it.            Current medicines (including changes today)  Current Outpatient Prescriptions   Medication Sig Dispense Refill   • fluticasone (FLOVENT HFA) 220 MCG/ACT Aerosol Inhale 1 Puff by mouth 2 times a day. 1 Inhaler 11   • sulfamethoxazole-trimethoprim (BACTRIM) 400-80 MG Tab Take 1 Tab by mouth 2 times a day for 7 days. 14 Tab 0   • celecoxib (CELEBREX) 200 MG Cap TAKE ONE CAPSULE BY MOUTH TWICE DAILY 180 Cap 3   • cyclobenzaprine (FLEXERIL) 10 MG Tab TAKE ONE TABLET BY MOUTH THREE TIMES DAILY AS NEEDED FOR MILD PAIN 90 Tab 3   • fluticasone (FLONASE) 50 MCG/ACT nasal spray Spray 1 Spray in nose every day. 16 g 11   • gabapentin (NEURONTIN) 300 MG Cap TAKE TWO CAPSULES BY MOUTH ONCE DAILY IN THE MORNING AND THREE CAPSULES IN THE EVENING 450 Cap 3   • gemfibrozil (LOPID) 600 MG Tab TAKE ONE TABLET BY MOUTH TWICE DAILY 180 Tab 3   •  "lisinopril-hydrochlorothiazide (PRINZIDE, ZESTORETIC) 10-12.5 MG per tablet Take 1 Tab by mouth every day. 90 Tab 3   • omeprazole (PRILOSEC) 20 MG delayed-release capsule TAKE ONE CAPSULE BY MOUTH TWICE DAILY 180 Cap 3   • albuterol (VENTOLIN OR PROVENTIL) 108 (90 BASE) MCG/ACT AERS Inhale 2 Puffs by mouth every 6 hours as needed for Shortness of Breath. 8.5 g 3   • ondansetron (ZOFRAN ODT) 4 MG TABLET DISPERSIBLE Take 1 Tab by mouth every 8 hours as needed. 10 Tab 0     No current facility-administered medications for this visit.      She  has a past medical history of Arthritis; CAD (coronary artery disease); Fibromyalgia; GERD (gastroesophageal reflux disease); Hyperlipidemia; Hypertension; and IBD (inflammatory bowel disease).    ROS  No chest pain, no shortness of breath, no abdominal pain       Objective:     Blood pressure 118/64, pulse 86, temperature 36.4 °C (97.6 °F), resp. rate 16, height 1.715 m (5' 7.5\"), weight 92.1 kg (203 lb), SpO2 98 %. Body mass index is 31.33 kg/m².   Physical Exam:  Constitutional: Alert, no distress.  Skin: Warm, dry, good turgor, no rashes in visible areas.  Eye: Equal, round and reactive, conjunctiva clear, lids normal.  ENMT: Lips without lesions, good dentition, oropharynx clear.  Neck: Trachea midline, no masses, no thyromegaly. No cervical or supraclavicular lymphadenopathy  Respiratory: Unlabored respiratory effort, lungs clear to auscultation, no wheezes, no ronchi.  Cardiovascular: Normal S1, S2, no murmur, no edema.  Abdomen: Soft, non-tender, no masses, no hepatosplenomegaly.  Psych: Alert and oriented x3, normal affect and mood.        Assessment and Plan:   The following treatment plan was discussed    1. Chronic fatigue  - TSH WITH REFLEX TO FT4; Future  Replace vit D    2. Skin infection  rx bactrim    3. SOB (shortness of breath)  Add flovent  Continue albuterol  - fluticasone (FLOVENT HFA) 220 MCG/ACT Aerosol; Inhale 1 Puff by mouth 2 times a day.  Dispense: 1 " Inhaler; Refill: 11    4. Obesity (BMI 30.0-34.9)  - Patient identified as having weight management issue.  Appropriate orders and counseling given.    5. Vitamin D deficiency  - VITAMIN D,25 HYDROXY; Future      Followup: Return in about 3 months (around 8/17/2018) for fatigue, post op visit, obesity, SOB.

## 2018-05-17 NOTE — ASSESSMENT & PLAN NOTE
She has been feeling more bloated (had total hysterectomy in the past.   She feel very gassy, used to take a lot of gasex in the past. Will try gasex. She had an upper and lower GI scope done, has follow up with GI for hiatal hernia repair.   She is on omeprazole, was given a different rx by her gastroenterologist but insurance did not cover it.

## 2018-05-17 NOTE — ASSESSMENT & PLAN NOTE
Boils in inguinal crease. Large, tender, red, when they pop, they have thick dark blood.  Sometimes yellow pus.

## 2018-05-17 NOTE — ASSESSMENT & PLAN NOTE
Since the flu in dec, pt noted increased fatigue, tiredness, shortness of breath  She had a normal cardiac stress test in 2013 and chest CT with only haital hernia: has appt with GI/surgery for the hiatal hernia.   Echo was checked and normal  Normal cbc, cmp  Will check TSH though it was normal in 2013 and 2014

## 2018-05-22 RX ORDER — ERGOCALCIFEROL 1.25 MG/1
50000 CAPSULE ORAL DAILY
Qty: 12 CAP | Refills: 1 | Status: SHIPPED | OUTPATIENT
Start: 2018-05-22 | End: 2018-06-04 | Stop reason: SDUPTHER

## 2018-05-24 ENCOUNTER — HOSPITAL ENCOUNTER (OUTPATIENT)
Dept: RADIOLOGY | Facility: MEDICAL CENTER | Age: 68
End: 2018-05-24
Attending: FAMILY MEDICINE
Payer: MEDICARE

## 2018-05-24 DIAGNOSIS — Z78.0 MENOPAUSE: ICD-10-CM

## 2018-05-24 DIAGNOSIS — Z13.820 SCREENING FOR OSTEOPOROSIS: ICD-10-CM

## 2018-05-24 PROCEDURE — 77080 DXA BONE DENSITY AXIAL: CPT

## 2018-06-01 ENCOUNTER — TELEPHONE (OUTPATIENT)
Dept: MEDICAL GROUP | Facility: PHYSICIAN GROUP | Age: 68
End: 2018-06-01

## 2018-06-01 NOTE — TELEPHONE ENCOUNTER
1. Caller Name: [facility]                      Call Back Number: [phone]    2. Message: [facility] would like a confirmation on Vitamin D2 sig: Take 1 Capsule by mouth once daily for 24 doses. [facility] states that this is normally written once a week not once a day. Please advise.    3. Patient approves office to leave a detailed voicemail/MyChart message: no

## 2018-06-04 RX ORDER — ERGOCALCIFEROL 1.25 MG/1
50000 CAPSULE ORAL
Qty: 12 CAP | Refills: 1 | Status: ON HOLD | OUTPATIENT
Start: 2018-06-04 | End: 2018-07-31

## 2018-07-09 ENCOUNTER — TELEPHONE (OUTPATIENT)
Dept: MEDICAL GROUP | Facility: PHYSICIAN GROUP | Age: 68
End: 2018-07-09

## 2018-07-09 DIAGNOSIS — H53.9 VISUAL CHANGES: ICD-10-CM

## 2018-07-09 NOTE — TELEPHONE ENCOUNTER
1. Caller Name: Hanna Marcelo                                         Call Back Number: [phone]      Patient approves a detailed voicemail message: N/A    Pt is wanting a referral to Opthalmology, she has had one before but when she tried to schedule it they told her she is going to need a new referral? Please advice.     Thank you

## 2018-07-23 ENCOUNTER — APPOINTMENT (OUTPATIENT)
Dept: ADMISSIONS | Facility: MEDICAL CENTER | Age: 68
End: 2018-07-23
Attending: SURGERY
Payer: MEDICARE

## 2018-07-23 DIAGNOSIS — Z01.812 PRE-OPERATIVE LABORATORY EXAMINATION: ICD-10-CM

## 2018-07-23 DIAGNOSIS — Z01.810 PRE-OPERATIVE CARDIOVASCULAR EXAMINATION: ICD-10-CM

## 2018-07-23 RX ORDER — ACETAMINOPHEN 325 MG/1
650 TABLET ORAL EVERY 4 HOURS PRN
Status: ON HOLD | COMMUNITY
End: 2018-07-31

## 2018-07-23 NOTE — OR NURSING
Left message with Dr Ganser's office/Linda/ regarding pt's report she was given instructions from Dr Ganser she could drive herself home postop/overnight stay for scheduled procedure 7.31.18; request Linda to have Dr Ganser advise.

## 2018-07-25 ENCOUNTER — HOSPITAL ENCOUNTER (OUTPATIENT)
Dept: LAB | Facility: MEDICAL CENTER | Age: 68
End: 2018-07-25
Attending: NURSE PRACTITIONER
Payer: MEDICARE

## 2018-07-25 ENCOUNTER — HOSPITAL ENCOUNTER (OUTPATIENT)
Dept: LAB | Facility: MEDICAL CENTER | Age: 68
End: 2018-07-25
Attending: SURGERY
Payer: MEDICARE

## 2018-07-25 DIAGNOSIS — Z01.812 PRE-OPERATIVE LABORATORY EXAMINATION: ICD-10-CM

## 2018-07-25 DIAGNOSIS — E78.2 MIXED HYPERLIPIDEMIA: ICD-10-CM

## 2018-07-25 LAB
ANION GAP SERPL CALC-SCNC: 11 MMOL/L (ref 0–11.9)
BUN SERPL-MCNC: 26 MG/DL (ref 8–22)
CALCIUM SERPL-MCNC: 10 MG/DL (ref 8.5–10.5)
CHLORIDE SERPL-SCNC: 102 MMOL/L (ref 96–112)
CHOLEST SERPL-MCNC: 298 MG/DL (ref 100–199)
CO2 SERPL-SCNC: 28 MMOL/L (ref 20–33)
CREAT SERPL-MCNC: 1.26 MG/DL (ref 0.5–1.4)
ERYTHROCYTE [DISTWIDTH] IN BLOOD BY AUTOMATED COUNT: 48.4 FL (ref 35.9–50)
GLUCOSE SERPL-MCNC: 83 MG/DL (ref 65–99)
HCT VFR BLD AUTO: 43.9 % (ref 37–47)
HDLC SERPL-MCNC: 57 MG/DL
HGB BLD-MCNC: 13.9 G/DL (ref 12–16)
LDLC SERPL CALC-MCNC: 221 MG/DL
MCH RBC QN AUTO: 30 PG (ref 27–33)
MCHC RBC AUTO-ENTMCNC: 31.7 G/DL (ref 33.6–35)
MCV RBC AUTO: 94.8 FL (ref 81.4–97.8)
PLATELET # BLD AUTO: 387 K/UL (ref 164–446)
PMV BLD AUTO: 9.4 FL (ref 9–12.9)
POTASSIUM SERPL-SCNC: 3.9 MMOL/L (ref 3.6–5.5)
RBC # BLD AUTO: 4.63 M/UL (ref 4.2–5.4)
SODIUM SERPL-SCNC: 141 MMOL/L (ref 135–145)
TRIGL SERPL-MCNC: 101 MG/DL (ref 0–149)
WBC # BLD AUTO: 7 K/UL (ref 4.8–10.8)

## 2018-07-25 PROCEDURE — 85027 COMPLETE CBC AUTOMATED: CPT | Mod: GA

## 2018-07-25 PROCEDURE — 80061 LIPID PANEL: CPT

## 2018-07-25 PROCEDURE — 36415 COLL VENOUS BLD VENIPUNCTURE: CPT | Mod: GA

## 2018-07-25 PROCEDURE — 80048 BASIC METABOLIC PNL TOTAL CA: CPT

## 2018-07-31 ENCOUNTER — HOSPITAL ENCOUNTER (OUTPATIENT)
Facility: MEDICAL CENTER | Age: 68
End: 2018-08-01
Attending: SURGERY | Admitting: SURGERY
Payer: MEDICARE

## 2018-07-31 DIAGNOSIS — G89.18 POSTOPERATIVE PAIN: ICD-10-CM

## 2018-07-31 LAB — EKG IMPRESSION: NORMAL

## 2018-07-31 PROCEDURE — 160041 HCHG SURGERY MINUTES - EA ADDL 1 MIN LEVEL 4: Performed by: SURGERY

## 2018-07-31 PROCEDURE — A9270 NON-COVERED ITEM OR SERVICE: HCPCS

## 2018-07-31 PROCEDURE — 160009 HCHG ANES TIME/MIN: Performed by: SURGERY

## 2018-07-31 PROCEDURE — A6402 STERILE GAUZE <= 16 SQ IN: HCPCS | Performed by: SURGERY

## 2018-07-31 PROCEDURE — 500522 HCHG ENDOSTITCH SUTURING DEVICE: Performed by: SURGERY

## 2018-07-31 PROCEDURE — 501583 HCHG TROCAR, THRD CAN&SEAL 5X100: Performed by: SURGERY

## 2018-07-31 PROCEDURE — 501570 HCHG TROCAR, SEPARATOR: Performed by: SURGERY

## 2018-07-31 PROCEDURE — 502571 HCHG PACK, LAP CHOLE: Performed by: SURGERY

## 2018-07-31 PROCEDURE — 160036 HCHG PACU - EA ADDL 30 MINS PHASE I: Performed by: SURGERY

## 2018-07-31 PROCEDURE — 501577 HCHG TROCAR, STEP 11MM: Performed by: SURGERY

## 2018-07-31 PROCEDURE — 160002 HCHG RECOVERY MINUTES (STAT): Performed by: SURGERY

## 2018-07-31 PROCEDURE — 96374 THER/PROPH/DIAG INJ IV PUSH: CPT

## 2018-07-31 PROCEDURE — 700101 HCHG RX REV CODE 250

## 2018-07-31 PROCEDURE — 160029 HCHG SURGERY MINUTES - 1ST 30 MINS LEVEL 4: Performed by: SURGERY

## 2018-07-31 PROCEDURE — 160048 HCHG OR STATISTICAL LEVEL 1-5: Performed by: SURGERY

## 2018-07-31 PROCEDURE — 500521 HCHG ENDOSTITCH LOAD UNIT: Performed by: SURGERY

## 2018-07-31 PROCEDURE — 501838 HCHG SUTURE GENERAL: Performed by: SURGERY

## 2018-07-31 PROCEDURE — 700111 HCHG RX REV CODE 636 W/ 250 OVERRIDE (IP): Performed by: PHYSICIAN ASSISTANT

## 2018-07-31 PROCEDURE — 700111 HCHG RX REV CODE 636 W/ 250 OVERRIDE (IP)

## 2018-07-31 PROCEDURE — 160035 HCHG PACU - 1ST 60 MINS PHASE I: Performed by: SURGERY

## 2018-07-31 PROCEDURE — 93005 ELECTROCARDIOGRAM TRACING: CPT | Performed by: SURGERY

## 2018-07-31 PROCEDURE — 700102 HCHG RX REV CODE 250 W/ 637 OVERRIDE(OP)

## 2018-07-31 PROCEDURE — 96375 TX/PRO/DX INJ NEW DRUG ADDON: CPT

## 2018-07-31 PROCEDURE — 501664 HCHG TUBING, FILTER STRYKER: Performed by: SURGERY

## 2018-07-31 PROCEDURE — 500868 HCHG NEEDLE, SURGI(VARES): Performed by: SURGERY

## 2018-07-31 PROCEDURE — 700102 HCHG RX REV CODE 250 W/ 637 OVERRIDE(OP): Performed by: PHYSICIAN ASSISTANT

## 2018-07-31 PROCEDURE — 93010 ELECTROCARDIOGRAM REPORT: CPT | Performed by: INTERNAL MEDICINE

## 2018-07-31 PROCEDURE — 700105 HCHG RX REV CODE 258: Performed by: PHYSICIAN ASSISTANT

## 2018-07-31 PROCEDURE — G0378 HOSPITAL OBSERVATION PER HR: HCPCS

## 2018-07-31 PROCEDURE — A9270 NON-COVERED ITEM OR SERVICE: HCPCS | Performed by: PHYSICIAN ASSISTANT

## 2018-07-31 RX ORDER — MORPHINE SULFATE 4 MG/ML
1-5 INJECTION, SOLUTION INTRAMUSCULAR; INTRAVENOUS
Status: DISCONTINUED | OUTPATIENT
Start: 2018-07-31 | End: 2018-08-01 | Stop reason: HOSPADM

## 2018-07-31 RX ORDER — GABAPENTIN 300 MG/1
300 CAPSULE ORAL 2 TIMES DAILY
Status: DISCONTINUED | OUTPATIENT
Start: 2018-07-31 | End: 2018-07-31

## 2018-07-31 RX ORDER — CYCLOBENZAPRINE HCL 10 MG
10 TABLET ORAL 3 TIMES DAILY PRN
Status: DISCONTINUED | OUTPATIENT
Start: 2018-07-31 | End: 2018-08-01 | Stop reason: HOSPADM

## 2018-07-31 RX ORDER — ONDANSETRON 2 MG/ML
INJECTION INTRAMUSCULAR; INTRAVENOUS
Status: COMPLETED
Start: 2018-07-31 | End: 2018-07-31

## 2018-07-31 RX ORDER — MIDAZOLAM HYDROCHLORIDE 1 MG/ML
INJECTION INTRAMUSCULAR; INTRAVENOUS
Status: COMPLETED
Start: 2018-07-31 | End: 2018-07-31

## 2018-07-31 RX ORDER — SODIUM CHLORIDE, SODIUM LACTATE, POTASSIUM CHLORIDE, CALCIUM CHLORIDE 600; 310; 30; 20 MG/100ML; MG/100ML; MG/100ML; MG/100ML
INJECTION, SOLUTION INTRAVENOUS CONTINUOUS
Status: DISCONTINUED | OUTPATIENT
Start: 2018-07-31 | End: 2018-07-31

## 2018-07-31 RX ORDER — ONDANSETRON 2 MG/ML
4 INJECTION INTRAMUSCULAR; INTRAVENOUS EVERY 4 HOURS PRN
Status: DISCONTINUED | OUTPATIENT
Start: 2018-07-31 | End: 2018-08-01 | Stop reason: HOSPADM

## 2018-07-31 RX ORDER — LIDOCAINE HYDROCHLORIDE 10 MG/ML
0.5 INJECTION, SOLUTION INFILTRATION; PERINEURAL
Status: ACTIVE | OUTPATIENT
Start: 2018-07-31 | End: 2018-08-01

## 2018-07-31 RX ORDER — KETOROLAC TROMETHAMINE 30 MG/ML
15 INJECTION, SOLUTION INTRAMUSCULAR; INTRAVENOUS EVERY 6 HOURS
Status: DISCONTINUED | OUTPATIENT
Start: 2018-07-31 | End: 2018-08-01 | Stop reason: HOSPADM

## 2018-07-31 RX ORDER — DIPHENHYDRAMINE HCL 25 MG
25 TABLET ORAL EVERY 6 HOURS PRN
Status: DISCONTINUED | OUTPATIENT
Start: 2018-07-31 | End: 2018-08-01 | Stop reason: HOSPADM

## 2018-07-31 RX ORDER — HALOPERIDOL 5 MG/ML
INJECTION INTRAMUSCULAR
Status: COMPLETED
Start: 2018-07-31 | End: 2018-07-31

## 2018-07-31 RX ORDER — DIPHENHYDRAMINE HYDROCHLORIDE 50 MG/ML
25 INJECTION INTRAMUSCULAR; INTRAVENOUS EVERY 6 HOURS PRN
Status: DISCONTINUED | OUTPATIENT
Start: 2018-07-31 | End: 2018-08-01 | Stop reason: HOSPADM

## 2018-07-31 RX ORDER — ACETAMINOPHEN 325 MG/1
650 TABLET ORAL EVERY 4 HOURS PRN
COMMUNITY
End: 2021-01-07

## 2018-07-31 RX ORDER — SODIUM CHLORIDE, SODIUM LACTATE, POTASSIUM CHLORIDE, CALCIUM CHLORIDE 600; 310; 30; 20 MG/100ML; MG/100ML; MG/100ML; MG/100ML
1000 INJECTION, SOLUTION INTRAVENOUS CONTINUOUS
Status: DISCONTINUED | OUTPATIENT
Start: 2018-07-31 | End: 2018-08-01 | Stop reason: ALTCHOICE

## 2018-07-31 RX ORDER — MEPERIDINE HYDROCHLORIDE 25 MG/ML
INJECTION INTRAMUSCULAR; INTRAVENOUS; SUBCUTANEOUS
Status: COMPLETED
Start: 2018-07-31 | End: 2018-07-31

## 2018-07-31 RX ORDER — ENALAPRILAT 1.25 MG/ML
2.5 INJECTION INTRAVENOUS EVERY 6 HOURS PRN
Status: DISCONTINUED | OUTPATIENT
Start: 2018-07-31 | End: 2018-08-01 | Stop reason: HOSPADM

## 2018-07-31 RX ORDER — LORAZEPAM 2 MG/ML
.5-1 INJECTION INTRAMUSCULAR EVERY 4 HOURS PRN
Status: DISCONTINUED | OUTPATIENT
Start: 2018-07-31 | End: 2018-08-01 | Stop reason: HOSPADM

## 2018-07-31 RX ORDER — GABAPENTIN 300 MG/1
900 CAPSULE ORAL
Status: DISCONTINUED | OUTPATIENT
Start: 2018-07-31 | End: 2018-08-01 | Stop reason: HOSPADM

## 2018-07-31 RX ORDER — ALBUTEROL SULFATE 90 UG/1
2 AEROSOL, METERED RESPIRATORY (INHALATION) EVERY 6 HOURS PRN
Status: DISCONTINUED | OUTPATIENT
Start: 2018-07-31 | End: 2018-08-01 | Stop reason: HOSPADM

## 2018-07-31 RX ORDER — OXYCODONE HCL 5 MG/5 ML
SOLUTION, ORAL ORAL
Status: COMPLETED
Start: 2018-07-31 | End: 2018-07-31

## 2018-07-31 RX ORDER — SCOLOPAMINE TRANSDERMAL SYSTEM 1 MG/1
1 PATCH, EXTENDED RELEASE TRANSDERMAL
Status: DISCONTINUED | OUTPATIENT
Start: 2018-07-31 | End: 2018-08-01 | Stop reason: HOSPADM

## 2018-07-31 RX ORDER — LIDOCAINE HYDROCHLORIDE 10 MG/ML
INJECTION, SOLUTION INFILTRATION; PERINEURAL
Status: COMPLETED
Start: 2018-07-31 | End: 2018-07-31

## 2018-07-31 RX ORDER — KETOROLAC TROMETHAMINE 30 MG/ML
INJECTION, SOLUTION INTRAMUSCULAR; INTRAVENOUS
Status: COMPLETED
Start: 2018-07-31 | End: 2018-07-31

## 2018-07-31 RX ORDER — BUPIVACAINE HYDROCHLORIDE AND EPINEPHRINE 5; 5 MG/ML; UG/ML
INJECTION, SOLUTION EPIDURAL; INTRACAUDAL; PERINEURAL
Status: DISCONTINUED | OUTPATIENT
Start: 2018-07-31 | End: 2018-07-31 | Stop reason: HOSPADM

## 2018-07-31 RX ORDER — ONDANSETRON 4 MG/1
4 TABLET, ORALLY DISINTEGRATING ORAL EVERY 4 HOURS PRN
Status: DISCONTINUED | OUTPATIENT
Start: 2018-07-31 | End: 2018-08-01 | Stop reason: HOSPADM

## 2018-07-31 RX ORDER — PROMETHAZINE HYDROCHLORIDE 25 MG/1
25 SUPPOSITORY RECTAL EVERY 6 HOURS PRN
Status: DISCONTINUED | OUTPATIENT
Start: 2018-07-31 | End: 2018-08-01 | Stop reason: HOSPADM

## 2018-07-31 RX ORDER — GABAPENTIN 300 MG/1
600 CAPSULE ORAL EVERY MORNING
Status: DISCONTINUED | OUTPATIENT
Start: 2018-08-01 | End: 2018-08-01 | Stop reason: HOSPADM

## 2018-07-31 RX ADMIN — HYDROCODONE BITARTRATE AND ACETAMINOPHEN 15 ML: 7.5; 325 SOLUTION ORAL at 18:50

## 2018-07-31 RX ADMIN — GABAPENTIN 900 MG: 300 CAPSULE ORAL at 23:15

## 2018-07-31 RX ADMIN — SODIUM CHLORIDE, SODIUM LACTATE, POTASSIUM CHLORIDE, CALCIUM CHLORIDE: 600; 310; 30; 20 INJECTION, SOLUTION INTRAVENOUS at 13:00

## 2018-07-31 RX ADMIN — SODIUM CHLORIDE, POTASSIUM CHLORIDE, SODIUM LACTATE AND CALCIUM CHLORIDE 1000 ML: 600; 310; 30; 20 INJECTION, SOLUTION INTRAVENOUS at 18:50

## 2018-07-31 RX ADMIN — FAMOTIDINE 20 MG: 10 INJECTION, SOLUTION INTRAVENOUS at 18:50

## 2018-07-31 RX ADMIN — LIDOCAINE HYDROCHLORIDE 0.5 ML: 10 INJECTION, SOLUTION INFILTRATION; PERINEURAL at 13:00

## 2018-07-31 RX ADMIN — ONDANSETRON HYDROCHLORIDE 4 MG: 2 INJECTION, SOLUTION INTRAMUSCULAR; INTRAVENOUS at 15:40

## 2018-07-31 RX ADMIN — KETOROLAC TROMETHAMINE 15 MG: 30 INJECTION, SOLUTION INTRAMUSCULAR at 16:02

## 2018-07-31 RX ADMIN — MIDAZOLAM 1 MG: 1 INJECTION INTRAMUSCULAR; INTRAVENOUS at 15:50

## 2018-07-31 RX ADMIN — OXYCODONE HYDROCHLORIDE 10 MG: 5 SOLUTION ORAL at 16:21

## 2018-07-31 RX ADMIN — HALOPERIDOL LACTATE 1 MG: 5 INJECTION, SOLUTION INTRAMUSCULAR at 16:05

## 2018-07-31 RX ADMIN — FENTANYL CITRATE 50 MCG: 50 INJECTION, SOLUTION INTRAMUSCULAR; INTRAVENOUS at 15:35

## 2018-07-31 RX ADMIN — HYDROMORPHONE HYDROCHLORIDE 0.5 MG: 10 INJECTION, SOLUTION INTRAMUSCULAR; INTRAVENOUS; SUBCUTANEOUS at 16:35

## 2018-07-31 RX ADMIN — MEPERIDINE HYDROCHLORIDE 12.5 MG: 25 INJECTION INTRAMUSCULAR; INTRAVENOUS; SUBCUTANEOUS at 15:40

## 2018-07-31 RX ADMIN — KETOROLAC TROMETHAMINE 15 MG: 30 INJECTION, SOLUTION INTRAMUSCULAR at 23:16

## 2018-07-31 RX ADMIN — FENTANYL CITRATE 50 MCG: 50 INJECTION, SOLUTION INTRAMUSCULAR; INTRAVENOUS at 15:50

## 2018-07-31 ASSESSMENT — PAIN SCALES - GENERAL
PAINLEVEL_OUTOF10: 5
PAINLEVEL_OUTOF10: 7
PAINLEVEL_OUTOF10: 6
PAINLEVEL_OUTOF10: ASSUMED PAIN PRESENT
PAINLEVEL_OUTOF10: 5
PAINLEVEL_OUTOF10: 0
PAINLEVEL_OUTOF10: 4
PAINLEVEL_OUTOF10: 6
PAINLEVEL_OUTOF10: ASSUMED PAIN PRESENT
PAINLEVEL_OUTOF10: 5
PAINLEVEL_OUTOF10: 6

## 2018-07-31 ASSESSMENT — PATIENT HEALTH QUESTIONNAIRE - PHQ9
SUM OF ALL RESPONSES TO PHQ9 QUESTIONS 1 AND 2: 0
2. FEELING DOWN, DEPRESSED, IRRITABLE, OR HOPELESS: NOT AT ALL
1. LITTLE INTEREST OR PLEASURE IN DOING THINGS: NOT AT ALL

## 2018-07-31 ASSESSMENT — COGNITIVE AND FUNCTIONAL STATUS - GENERAL
MOBILITY SCORE: 19
STANDING UP FROM CHAIR USING ARMS: A LITTLE
SUGGESTED CMS G CODE MODIFIER MOBILITY: CK
HELP NEEDED FOR BATHING: A LITTLE
DRESSING REGULAR LOWER BODY CLOTHING: A LITTLE
CLIMB 3 TO 5 STEPS WITH RAILING: A LITTLE
MOVING TO AND FROM BED TO CHAIR: A LITTLE
SUGGESTED CMS G CODE MODIFIER DAILY ACTIVITY: CJ
WALKING IN HOSPITAL ROOM: A LITTLE
MOVING FROM LYING ON BACK TO SITTING ON SIDE OF FLAT BED: A LITTLE
DAILY ACTIVITIY SCORE: 22

## 2018-07-31 ASSESSMENT — LIFESTYLE VARIABLES: EVER_SMOKED: NEVER

## 2018-07-31 NOTE — OR NURSING
"Pt.verbalized\"a little better\" from abd'l.pain,\"feels like a lot of gas\"-pt. had received PRN med's. as ordered.x5 lap. Stab sites remain with dressings CD/I,Ice pack inplaced,tolerated some ice chips and small sips of water.  "

## 2018-07-31 NOTE — OR NURSING
Pre op admission completed.  Pt educated on surgical plan of care, all questions answered.  Bed in low position and call light within reach.  Hourly rounding in place.  Pt states she will drive her vehicle home tomorrow and that Dr. Ganser is aware.

## 2018-07-31 NOTE — OP REPORT
Operative Report    Date: 7/31/2018    Surgeon: John Ganser M.D.    Assistant: Tucker Myers PA-C    Anesthesia: Dr. Naranjo    Pre-operative Diagnosis: GERD, moderate hiatal hernia    Post-operative Diagnosis: Same     Procedure: Laparoscopic Nissen fundoplication    Indications:     The patient has a long history of reflux. Extensive evaluation by gastroenterology confirms indications for surgery. She has a moderate hiatal hernia. Risks, benefits, and alternatives to Nissen fundoplication were outlined in detail. All questions were answered and they  wish to proceed.    Findings: Small sliding hiatal hernia    Procedure in detail: The patient was identified and a general anesthetic was administered. The patient was positioned supine and appropriately padded. The patient's abdomen was prepped and draped in the usual sterile fashion.     A small incision was made above to and the left of the umbilicus. The fascia was elevated and the Veress needle inserted and the abdomen insufflated with CO2. A 5 mm trocar was trocar was then inserted and a laparoscope inserted and the abdomen inspected. A Raffi liver retractor was placed through a small subxiphoid incision.  Two 5 mm and one 11 mm trocars were placed in the upper abdomen.    The gastrohepatic ligament was opened with the harmonic scalpel, which was used for all of the dissection. There was a replaced left hepatic artery that was left intact. The right akiko muscle was identified and the hernia dissected away. Dissection was carried posteriorly to the junction with the left akiko. The hernia sac was mobilized from the hiatus circumferentially and reduced. Circumferential dissection was carried around the esophagus into the mediastinum allowing the GE junction to be brought below the hiatus under no tension upwards.The vagus nerves were identified and protected.     The short gastric vessels were divided on the upper aspect of the fundus with the harmonic scalpel to  allow for fundoplication.    Posterior crural repair was carried out with 0-Surgidac using the endostitch device.   Fundoplication was carried out rotating the fundus from left to right behind the esophagus and posterolateral sutures placed between the esophagus, akiko and stomach on both sides. A 54F bougie was passed by anesthesia and the wrap pulled anteriorly anteriorly. The wrap was sutured to itself and the the esophagus above the GE junction with three interrupted sutures. An appropriate wrap was confirmed by being able to lift the wrap lateral to the esophagus with the bougie in place. Anterolateral tacking sutures were placed. The bougie was removed and the wrap maintained good orientation with no torsion or tension upwards. The abdomen was irrigated and hemostasis assured. The laparoscope was withdrawn and the trocars removed. The skin was closed with 4-0 Vicryl subcuticular sutures. Sterile dressing were applied. The patient tolerated the procedure well and was taken to the PACU in satisfactory condition.    Sponge and needle counts were correct at the end of the case.     John Ganser, MD, [facility]  [phone]

## 2018-08-01 VITALS
TEMPERATURE: 98 F | SYSTOLIC BLOOD PRESSURE: 136 MMHG | DIASTOLIC BLOOD PRESSURE: 83 MMHG | OXYGEN SATURATION: 92 % | HEART RATE: 75 BPM | WEIGHT: 199.52 LBS | HEIGHT: 67 IN | BODY MASS INDEX: 31.31 KG/M2 | RESPIRATION RATE: 16 BRPM

## 2018-08-01 PROCEDURE — G0378 HOSPITAL OBSERVATION PER HR: HCPCS

## 2018-08-01 PROCEDURE — 700111 HCHG RX REV CODE 636 W/ 250 OVERRIDE (IP): Performed by: PHYSICIAN ASSISTANT

## 2018-08-01 PROCEDURE — 96372 THER/PROPH/DIAG INJ SC/IM: CPT

## 2018-08-01 PROCEDURE — 700102 HCHG RX REV CODE 250 W/ 637 OVERRIDE(OP): Performed by: PHYSICIAN ASSISTANT

## 2018-08-01 PROCEDURE — 96376 TX/PRO/DX INJ SAME DRUG ADON: CPT

## 2018-08-01 PROCEDURE — A9270 NON-COVERED ITEM OR SERVICE: HCPCS | Performed by: PHYSICIAN ASSISTANT

## 2018-08-01 RX ORDER — ACETAMINOPHEN 325 MG/1
650 TABLET ORAL ONCE
Status: COMPLETED | OUTPATIENT
Start: 2018-08-01 | End: 2018-08-01

## 2018-08-01 RX ADMIN — KETOROLAC TROMETHAMINE 15 MG: 30 INJECTION, SOLUTION INTRAMUSCULAR at 05:42

## 2018-08-01 RX ADMIN — FAMOTIDINE 20 MG: 10 INJECTION, SOLUTION INTRAVENOUS at 05:42

## 2018-08-01 RX ADMIN — GABAPENTIN 600 MG: 300 CAPSULE ORAL at 05:42

## 2018-08-01 RX ADMIN — ACETAMINOPHEN 650 MG: 325 TABLET, FILM COATED ORAL at 17:34

## 2018-08-01 RX ADMIN — KETOROLAC TROMETHAMINE 15 MG: 30 INJECTION, SOLUTION INTRAMUSCULAR at 12:19

## 2018-08-01 RX ADMIN — ENOXAPARIN SODIUM 40 MG: 100 INJECTION SUBCUTANEOUS at 05:42

## 2018-08-01 RX ADMIN — HYDROCODONE BITARTRATE AND ACETAMINOPHEN 15 ML: 7.5; 325 SOLUTION ORAL at 02:49

## 2018-08-01 ASSESSMENT — PAIN SCALES - GENERAL
PAINLEVEL_OUTOF10: 0
PAINLEVEL_OUTOF10: 5
PAINLEVEL_OUTOF10: 0
PAINLEVEL_OUTOF10: 2
PAINLEVEL_OUTOF10: 0
PAINLEVEL_OUTOF10: 5
PAINLEVEL_OUTOF10: 2
PAINLEVEL_OUTOF10: 0

## 2018-08-01 ASSESSMENT — LIFESTYLE VARIABLES: ALCOHOL_USE: NO

## 2018-08-01 ASSESSMENT — COPD QUESTIONNAIRES
DO YOU EVER COUGH UP ANY MUCUS OR PHLEGM?: NO/ONLY WITH OCCASIONAL COLDS OR INFECTIONS
DURING THE PAST 4 WEEKS HOW MUCH DID YOU FEEL SHORT OF BREATH: NONE/LITTLE OF THE TIME
IN THE PAST 12 MONTHS DO YOU DO LESS THAN YOU USED TO BECAUSE OF YOUR BREATHING PROBLEMS: DISAGREE/UNSURE
COPD SCREENING SCORE: 2
HAVE YOU SMOKED AT LEAST 100 CIGARETTES IN YOUR ENTIRE LIFE: NO/DON'T KNOW

## 2018-08-01 NOTE — PROGRESS NOTES
Ao x 4, sitting up eating lunch.  Did c/o some reflux before breafast after drinking apple juice, but stated no problems with breakfast and none so far with lunch.  Lap sites with c/d/i, gazue and tegaderm initial surgical dressings.  IS in use, achieves 1500.  Up to restroom to void, steady gait.  Toradol for pain relief, pain level 2/10 in abdomen.  Plan of care discussed, questions answered, verbalized understanding.

## 2018-08-01 NOTE — CARE PLAN
Problem: Communication  Goal: The ability to communicate needs accurately and effectively will improve  Outcome: PROGRESSING AS EXPECTED      Problem: Safety  Goal: Will remain free from injury  Outcome: PROGRESSING AS EXPECTED      Problem: Venous Thromboembolism (VTW)/Deep Vein Thrombosis (DVT) Prevention:  Goal: Patient will participate in Venous Thrombosis (VTE)/Deep Vein Thrombosis (DVT)Prevention Measures  Outcome: PROGRESSING AS EXPECTED      Problem: Pain Management  Goal: Pain level will decrease to patient's comfort goal  Outcome: PROGRESSING AS EXPECTED

## 2018-08-01 NOTE — PROGRESS NOTES
"Patient arrived to floor with transport.  Ambulated to bed x1 assist, unsteady gait.  A&O x4.  /95   Pulse 66   Temp 36.3 °C (97.4 °F)   Resp 17   Ht 1.702 m (5' 7\")   Wt 90.5 kg (199 lb 8.3 oz)   SpO2 94%   BMI 31.25 kg/m²   SpO2 96% on 2L NC.  Denies nausea.  C/o 7/10 pain, will medicate per MAR.  Abdominal lap sites x5 with gauze/tegaderm, CDI.  - void since surgery.   Call light and personal belongings within reach.  Welcome packet and quiet pack given.  Educated on unit and hospital policies.  POC discussed and all questions answered.  SCDs in place.  No additional needs at this time.    "

## 2018-08-01 NOTE — PROGRESS NOTES
2 RN skin assessment completed.    All bony prominences intact.  Abdominal lap sites x5.  No other compromised skin integrity noted.

## 2018-08-02 NOTE — PROGRESS NOTES
Discharging Patient home per physician order. Received telephone order for discharge today.  No reports of gerd or reflux.  No nausea or emesis.  Discharged with self. Patient stated that Dr. Ganser is aware that she is driving her self home and she has not had any narcotic pain medication since 0249 this am.  Patient demonstrates sound mind and body. Demonstrated understanding of discharge instructions, follow up appointments, home medications, prescriptions, home care for surgical wound, and nursing care instructions for laparoscopic nissen fundaplaction.  Ambulating without assistance, voiding without difficulty, pain well controlled, tolerating oral medications, oxygen saturation greater than 90% , tolerating diet.  Lap sites with c/c/i dressings.  Discussed home meds, next times doses due, and opiate safety, consent signed.  Educational handouts given and discussed.  Verbalized understanding of discharge instructions and educational handouts.  All questions answered.  Belongings with patient at time of discharge.

## 2018-08-02 NOTE — DISCHARGE INSTRUCTIONS
Discharge Instructions    Discharged to home by car with self. Discharged via walking, hospital escort: Yes.  Special equipment needed: Not Applicable    Be sure to schedule a follow-up appointment with your primary care doctor or any specialists as instructed.     Discharge Plan:   Diet Plan: Discussed  Activity Level: Discussed  Confirmed Follow up Appointment: Patient to Call and Schedule Appointment  Confirmed Symptoms Management: Discussed  Medication Reconciliation Updated: Yes  Pneumococcal Vaccine Administered/Refused: Not given - Patient refused pneumococcal vaccine  Influenza Vaccine Indication: Patient Refuses    I understand that a diet low in cholesterol, fat, and sodium is recommended for good health. Unless I have been given specific instructions below for another diet, I accept this instruction as my diet prescription.   Other diet: as instructed    Special Instructions: None    · Is patient discharged on Warfarin / Coumadin?   No     Depression / Suicide Risk    As you are discharged from this [facility] facility, it is important to learn how to keep safe from harming yourself.    Recognize the warning signs:  · Abrupt changes in personality, positive or negative- including increase in energy   · Giving away possessions  · Change in eating patterns- significant weight changes-  positive or negative  · Change in sleeping patterns- unable to sleep or sleeping all the time   · Unwillingness or inability to communicate  · Depression  · Unusual sadness, discouragement and loneliness  · Talk of wanting to die  · Neglect of personal appearance   · Rebelliousness- reckless behavior  · Withdrawal from people/activities they love  · Confusion- inability to concentrate     If you or a loved one observes any of these behaviors or has concerns about self-harm, here's what you can do:  · Talk about it- your feelings and reasons for harming yourself  · Remove any means that you might use to hurt yourself  (examples: pills, rope, extension cords, firearm)  · Get professional help from the community (Mental Health, Substance Abuse, psychological counseling)  · Do not be alone:Call your Safe Contact- someone whom you trust who will be there for you.  · Call your local CRISIS HOTLINE 167-2071 or 771-854-0247  · Call your local Children's Mobile Crisis Response Team [place] (578) 272-9753 or www.TravelPi  · Call the toll free National Suicide Prevention Hotlines   · National Suicide Prevention Lifeline 519-639-NHDK (9676)  · [employer] Line Network 800-SUICIDE (249-2877)        Ok to Shower over Tegaderm dressings POD#1; remove Tegaderms on POD#4   No baths or soaks x 7 days   No driving x 4-5 days   No lifting > 20 lbs until after post-op appt   Begin prilosec at home Post Op Day#2  (all home med's larger in size than eraser head should be crushed or changed to liquid form x 2-3 weeks, while on liquid diet).   Hold hydrochlorothiazide for 2-3 days, until patient able to tolerate > 64 oz of fluid intake daily.   Hold MVI/supplements until Post Op Day #8   F/U with Dr. Ganser ~ 1-2 weeks    Laparoscopic Nissen Fundoplication, Care After  Refer to this sheet in the next few weeks. These instructions provide you with information about caring for yourself after your procedure. Your health care provider may also give you more specific instructions. Your treatment has been planned according to current medical practices, but problems sometimes occur. Call your health care provider if you have any problems or questions after your procedure.  WHAT TO EXPECT AFTER THE PROCEDURE  After your procedure, it is common to have:   · Difficulty swallowing (dysphagia).  · Excess gas (bloating).  HOME CARE INSTRUCTIONS  Medicines  · Take medicines only as directed by your health care provider.  · Do not drive or operate heavy machinery while taking pain medicine.  Incision Care  · There are many different ways to close and  cover an incision, including stitches (sutures), skin glue, and adhesive strips. Follow your health care provider's instructions about:  ¨ Incision care.  ¨ Bandage (dressing) changes and removal.  ¨ Incision closure removal.  · Check your incision areas every day for signs of infection. Watch for:  ¨ Redness, swelling, or pain.  ¨ Fluid, blood, or pus.  · Do not take baths, swim, or use a hot tub until your health care provider approves. Take showers as directed by your health care provider.  Eating and Drinking  · Follow your health care provider's instructions about eating.  ¨ You may need to follow a liquid-only diet for 2 weeks, followed by a diet of soft foods for 2 weeks.  ¨ You should return to your usual diet gradually.  · Drink enough fluid to keep your urine clear or pale yellow.  Activities  · Return to your normal activities as directed by your health care provider. Ask your health care provider what activities are safe for you.  · Avoid strenuous exercise.  · Do not lift anything that is heavier than 10 lb (4.5 kg).  · Ask your health care provider when you can:  ¨ Return to sexual activity.  ¨ Drive.  ¨ Go back to work.  SEEK MEDICAL CARE IF:  · You have a fever.  · Your pain gets worse or is not helped by medicine.  · You have frequent nausea or vomiting.  · You have continued abdominal bloating.  · You have an ongoing (persistent) cough.  · You have redness, swelling, or pain in any incision areas.  · You have fluid, blood, or pus coming from any incisions.  SEEK IMMEDIATE MEDICAL CARE IF:  · You have trouble breathing.  · You are unable to swallow.  · You have persistent vomiting.  · You have blood in your vomit.  · You have severe abdominal pain.     This information is not intended to replace advice given to you by your health care provider. Make sure you discuss any questions you have with your health care provider.     Document Released: 08/10/2005 Document Revised: 01/08/2016 Document  Reviewed: 08/19/2015  [employer] Interactive Patient Education ©2016 [employer]    Incentive Spirometer  An incentive spirometer is a tool that measures how well you are filling your lungs with each breath. This tool can help keep your lungs clear and active. Taking long, deep breaths may help reverse or decrease the chance of developing breathing (pulmonary) problems, especially infection, following:  · Surgery of the chest or abdomen.  · Surgery if you have a history of smoking or a lung problem.  · A long period of time when you are unable to move or be active.  If the spirometer includes an indicator to show your best effort, your health care provider or respiratory therapist will help you set a goal. Keep a log of your progress if directed by your health care provider.  What are the risks?  · Breathing too quickly may cause dizziness or cause you to pass out. Take your time so you do not get dizzy or lightheaded.  · If you are in pain, you may need to take or ask for pain medicine before doing incentive spirometry. It is harder to take a deep breath if you are having pain.  How to use your incentive spirometer  2. Sit on the edge of your bed if possible, or sit up as far as you can in bed or on a chair.  3. Hold the incentive spirometer in an upright position.  4. Breathe out normally.  5. Place the mouthpiece in your mouth and seal your lips tightly around it.  6. Breathe in slowly and as deeply as possible, raising the piston or the ball toward the top of the column.  7. Hold your breath for 3-5 seconds or for as long as possible. Allow the piston or ball to fall to the bottom of the column.  8. Remove the mouthpiece from your mouth and breathe out normally.  9. The spirometer may include an indicator to show your best effort. Use the indicator as a goal to work toward during each repetition.  10. Rest for a few seconds and repeat this at least 10 times, every 1-2 hours when you are awake. Take your time and  take a few normal breaths between deep breaths. Breathing too quickly may cause dizziness or cause you to pass out. Take your time so you do not get dizzy or lightheaded.  11. After each set of 10 deep breaths, practice coughing to be sure your lungs are clear. If you had a surgical cut (incision) made during surgery, support your incision when coughing by placing a pillow or rolled-up towel firmly against it.  Once you are able to get out of bed, walk around indoors and cough well. You may stop using the incentive spirometer when instructed by your health care provider.  Contact a health care provider if:  · You are having difficulty using the spirometer.  · You have trouble using the spirometer as often as instructed.  · Your pain medicine is not giving enough relief while using the spirometer.  · You have a fever.  · You develop shortness of breath.  Get help right away if:  · You develop a cough with bloody sputum.  · You develop worsening pain, redness, or discharge at or near the incision site.  This information is not intended to replace advice given to you by your health care provider. Make sure you discuss any questions you have with your health care provider.  Document Released: 04/29/2008 Document Revised: 09/11/2017 Document Reviewed: 07/27/2015  [employer] Interactive Patient Education © 2017 [employer]

## 2018-08-09 NOTE — DISCHARGE SUMMARY
DATE OF ADMISSION:  07/31/2018.    DATE OF DISCHARGE:  08/01/2018.    ADMITTING DIAGNOSES:  1.  Gastroesophageal reflux disease.  2.  Moderately sized hiatal hernia.    DISCHARGE DIAGNOSES:  1.  Gastroesophageal reflux disease.  2.  Moderately sized hiatal hernia.    OPERATION PERFORMED:  Laparoscopic Nissen fundoplication, performed by Dr. Ganser on 07/31/2018.    INDICATIONS:  The patient is a 68-year-old female who has a long history of   reflux.  Preoperative evaluation by gastroenterology confirms indications for   surgery.  After an involved preoperative education and informed consent   process, the patient was brought to the operating room for the aforementioned   procedure.    HOSPITAL COURSE:  After the procedure, the patient went to the general   surgical unit in stable condition.  At the time of discharge, on postoperative   day #1, patient's vital signs were stable and she is afebrile.  She is   ambulatory and tolerating noncarbonated clear liquids well without dysphagia,   nausea, or vomiting.  Pain is well controlled.  Her abdomen is soft.  Her   wounds are clear.    DISPOSITION:  Patient will be discharged home.    DISCHARGE INSTRUCTIONS:  Patient is instructed to follow up with Dr. Ganser in   the next 1-2 weeks.  She is counseled extensively regarding diet, activity,   wound care, and home medications.    DISCHARGE MEDICATIONS:  Hydrocodone solution 7.5 mg/325 mg/15 mL dispensed   quantity 240 mL.  Instructions are 10-20 mL p.o. q. 6 hours p.r.n. pain.       ____________________________________     YOSELIN DONOHUE / MILKA    DD:  08/08/2018 18:34:02  DT:  08/09/2018 00:15:21    D#:  [id number]  Job#:  [id number]    cc: SHERRY NEWELL MD

## 2018-08-22 ENCOUNTER — OFFICE VISIT (OUTPATIENT)
Dept: MEDICAL GROUP | Facility: PHYSICIAN GROUP | Age: 68
End: 2018-08-22
Payer: MEDICARE

## 2018-08-22 VITALS
SYSTOLIC BLOOD PRESSURE: 130 MMHG | OXYGEN SATURATION: 95 % | TEMPERATURE: 98.1 F | HEART RATE: 90 BPM | HEIGHT: 68 IN | RESPIRATION RATE: 16 BRPM | WEIGHT: 187.4 LBS | DIASTOLIC BLOOD PRESSURE: 80 MMHG | BODY MASS INDEX: 28.4 KG/M2

## 2018-08-22 DIAGNOSIS — K21.9 GASTROESOPHAGEAL REFLUX DISEASE, ESOPHAGITIS PRESENCE NOT SPECIFIED: ICD-10-CM

## 2018-08-22 DIAGNOSIS — R22.1 NECK MASS: ICD-10-CM

## 2018-08-22 DIAGNOSIS — R94.4 DECREASED GFR: ICD-10-CM

## 2018-08-22 DIAGNOSIS — R49.0 HOARSENESS OF VOICE: ICD-10-CM

## 2018-08-22 PROBLEM — R35.0 URINARY FREQUENCY: Status: RESOLVED | Noted: 2017-01-27 | Resolved: 2018-08-22

## 2018-08-22 PROCEDURE — 99214 OFFICE O/P EST MOD 30 MIN: CPT | Performed by: FAMILY MEDICINE

## 2018-08-22 NOTE — ASSESSMENT & PLAN NOTE
GFR decreased to 42 after which she had hernia surgery, will check renal function again.   Encouraged hydration but due to nausea after surgery she is finding this hard, advised to take zofran to help with nause, she has an old rx at home.

## 2018-08-22 NOTE — ASSESSMENT & PLAN NOTE
Lipoma seen on US a few years ago. She has hoarseness of voice and voice gets tired. Also worsening nausea and esophageal spasms.   Will check CT scan.

## 2018-08-22 NOTE — PROGRESS NOTES
Subjective:   Hanna Marcelo is a 68 y.o. female here today for evaluation and management of:     GERD (gastroesophageal reflux disease)  Patient had hernia surgery repair 3 weeks ago 7/31 and recently started on solids and having severe nausea, gagging with brushing her teeth and even liquids and water are making her nauseous. She felt better initially after surgery until trying the sandwich which was the first dry solid foods.   She has been provided a medication to coat her esophagus (carafate) and advised to go back to all liquid and soft diet, She had an appointment yesterday with her surgeon and has a phone appointment with her surgeon coming up on Tuesday next week.   She is back on her prilosec. But with other medications feels like she is getting a knot in her stomach.   She also has a hoarse voice, still gets spasms of her esophagus which were present before her surgery.     Neck mass  Lipoma seen on US a few years ago. She has hoarseness of voice and voice gets tired. Also worsening nausea and esophageal spasms.   Will check CT scan.     Decreased GFR  GFR decreased to 42 after which she had hernia surgery, will check renal function again.   Encouraged hydration but due to nausea after surgery she is finding this hard, advised to take zofran to help with nause, she has an old rx at home.          Current medicines (including changes today)  Current Outpatient Prescriptions   Medication Sig Dispense Refill   • acetaminophen (TYLENOL) 325 MG Tab Take 650 mg by mouth every four hours as needed.     • Artificial Tear Ointment (DRY EYES OP) Place 1 Drop in both eyes 1 time daily as needed.     • multivitamin (THERAGRAN) Tab Take 1 Tab by mouth every day.     • hydrochlorothiazide (MICROZIDE) 12.5 MG capsule Take 1 Cap by mouth every day. 90 Cap 3   • celecoxib (CELEBREX) 200 MG Cap TAKE ONE CAPSULE BY MOUTH TWICE DAILY 180 Cap 3   • cyclobenzaprine (FLEXERIL) 10 MG Tab TAKE ONE TABLET BY MOUTH THREE TIMES  "DAILY AS NEEDED FOR MILD PAIN 90 Tab 3   • gabapentin (NEURONTIN) 300 MG Cap TAKE TWO CAPSULES BY MOUTH ONCE DAILY IN THE MORNING AND THREE CAPSULES IN THE EVENING 450 Cap 3   • omeprazole (PRILOSEC) 20 MG delayed-release capsule TAKE ONE CAPSULE BY MOUTH TWICE DAILY 180 Cap 3   • albuterol (VENTOLIN OR PROVENTIL) 108 (90 BASE) MCG/ACT AERS Inhale 2 Puffs by mouth every 6 hours as needed for Shortness of Breath. 8.5 g 3   • gemfibrozil (LOPID) 600 MG Tab TAKE ONE TABLET BY MOUTH TWICE DAILY (Patient not taking: Reported on 8/22/2018) 180 Tab 3     No current facility-administered medications for this visit.      She  has a past medical history of Arthritis; Breath shortness; Bronchitis; CAD (coronary artery disease); Dental disorder (07/2018); DJD (degenerative joint disease); Fibromyalgia; GERD (gastroesophageal reflux disease); Heart burn; Heart murmur; Hepatitis B; Hiatus hernia syndrome; High cholesterol; Hyperlipidemia; Hypertension; IBD (inflammatory bowel disease); Indigestion; and Pain.    ROS  No chest pain, no shortness of breath, no abdominal pain       Objective:     Blood pressure 130/80, pulse 90, temperature 36.7 °C (98.1 °F), resp. rate 16, height 1.715 m (5' 7.5\"), weight 85 kg (187 lb 6.4 oz), SpO2 95 %. Body mass index is 28.92 kg/m².   Physical Exam:  Constitutional: Alert, no distress.  Skin: Warm, dry, good turgor, no rashes in visible areas.  Eye: Equal, round and reactive, conjunctiva clear, lids normal.  ENMT: Lips without lesions, good dentition, oropharynx clear.  Neck: Trachea midline, no masses, no thyromegaly. No cervical or supraclavicular lymphadenopathy  Respiratory: Unlabored respiratory effort, lungs clear to auscultation, no wheezes, no ronchi.  Cardiovascular: Normal S1, S2, no murmur, no edema.  Abdomen: Soft, non-tender, no masses, no hepatosplenomegaly.  Psych: Alert and oriented x3, normal affect and mood.        Assessment and Plan:   The following treatment plan was " discussed    1. Gastroesophageal reflux disease, esophagitis presence not specified  Follow up with surgeon regarding hoarseness and nausea after surgery    2. Neck mass  Check CT neck.   - CT-SOFT TISSUE NECK W/O; Future    3. Decreased GFR  Advised hydration, avoid NSAIDS  - COMP METABOLIC PANEL; Future    4. Hoarseness of voice  - CT-SOFT TISSUE NECK W/O; Future      Followup: No Follow-up on file.

## 2018-08-22 NOTE — ASSESSMENT & PLAN NOTE
Patient had hernia surgery repair 3 weeks ago 7/31 and recently started on solids and having severe nausea, gagging with brushing her teeth and even liquids and water are making her nauseous. She felt better initially after surgery until trying the sandwich which was the first dry solid foods.   She has been provided a medication to coat her esophagus (carafate) and advised to go back to all liquid and soft diet, She had an appointment yesterday with her surgeon and has a phone appointment with her surgeon coming up on Tuesday next week.   She is back on her prilosec. But with other medications feels like she is getting a knot in her stomach.   She also has a hoarse voice, still gets spasms of her esophagus which were present before her surgery.

## 2018-09-10 ENCOUNTER — HOSPITAL ENCOUNTER (OUTPATIENT)
Dept: RADIOLOGY | Facility: MEDICAL CENTER | Age: 68
End: 2018-09-10
Attending: FAMILY MEDICINE
Payer: MEDICARE

## 2018-09-10 DIAGNOSIS — R22.1 NECK MASS: ICD-10-CM

## 2018-09-10 DIAGNOSIS — R49.0 HOARSENESS OF VOICE: ICD-10-CM

## 2018-09-10 PROCEDURE — 700117 HCHG RX CONTRAST REV CODE 255: Performed by: FAMILY MEDICINE

## 2018-09-10 PROCEDURE — 70491 CT SOFT TISSUE NECK W/DYE: CPT

## 2018-09-10 RX ADMIN — IOHEXOL 100 ML: 350 INJECTION, SOLUTION INTRAVENOUS at 15:58

## 2018-09-17 DIAGNOSIS — R49.0 PERSISTENT HOARSENESS: ICD-10-CM

## 2018-10-19 ENCOUNTER — TELEPHONE (OUTPATIENT)
Dept: MEDICAL GROUP | Facility: PHYSICIAN GROUP | Age: 68
End: 2018-10-19

## 2018-10-19 ENCOUNTER — TELEMEDICINE ORIGINATING SITE VISIT (OUTPATIENT)
Dept: MEDICAL GROUP | Facility: PHYSICIAN GROUP | Age: 68
End: 2018-10-19
Payer: MEDICARE

## 2018-10-19 ENCOUNTER — APPOINTMENT (OUTPATIENT)
Dept: MEDICAL GROUP | Facility: PHYSICIAN GROUP | Age: 68
End: 2018-10-19
Payer: MEDICARE

## 2018-10-19 DIAGNOSIS — R49.0 HOARSENESS: ICD-10-CM

## 2018-10-20 NOTE — TELEPHONE ENCOUNTER
Patient had a Telemedicine appointment today 10/19/18. Patient arrived at 10:28 am for her 10:30 am appointment. Patient got upset that she had to do vitals. As I was doing her weight she started cursing. Patient got even more upset when I had to get her height as it ruined her hair. Advised patient that the telemedicine provider she was seeing today was not with [facility] and they require all patient's vital to be done. I also let my manager know how she was behaving before I proceeded. During the visit patient stated that she could not hear or see the screen. Volume and screens were fine for all other patients. I could not turn any louder or adjust the screen. To make matters worse the equipment went dead while I was in middle of the exam. Apologized to Provider Kaitlin and she said it was fine as she needs patient to be seen in office for further testing. Also Provider Kaitlin states that we need a tuning fork that does not vibrate-advised manager. Patient also made a very rude comment before leaving the room-I'll just leave it there-no need to repeat.

## 2018-11-19 ENCOUNTER — OFFICE VISIT (OUTPATIENT)
Dept: MEDICAL GROUP | Facility: PHYSICIAN GROUP | Age: 68
End: 2018-11-19
Payer: MEDICARE

## 2018-11-19 VITALS
WEIGHT: 196.2 LBS | TEMPERATURE: 98.1 F | OXYGEN SATURATION: 94 % | DIASTOLIC BLOOD PRESSURE: 70 MMHG | HEART RATE: 81 BPM | BODY MASS INDEX: 31.53 KG/M2 | SYSTOLIC BLOOD PRESSURE: 148 MMHG | RESPIRATION RATE: 18 BRPM | HEIGHT: 66 IN

## 2018-11-19 DIAGNOSIS — R49.0 HOARSENESS OF VOICE: ICD-10-CM

## 2018-11-19 DIAGNOSIS — E55.9 VITAMIN D DEFICIENCY: ICD-10-CM

## 2018-11-19 DIAGNOSIS — I10 ESSENTIAL HYPERTENSION: ICD-10-CM

## 2018-11-19 DIAGNOSIS — E78.2 MIXED HYPERLIPIDEMIA: ICD-10-CM

## 2018-11-19 DIAGNOSIS — R14.3 FLATULENCE: ICD-10-CM

## 2018-11-19 PROCEDURE — 99214 OFFICE O/P EST MOD 30 MIN: CPT | Performed by: FAMILY MEDICINE

## 2018-11-19 RX ORDER — SIMETHICONE 80 MG
80 TABLET,CHEWABLE ORAL EVERY 6 HOURS PRN
Qty: 30 TAB | Refills: 6 | Status: SHIPPED | OUTPATIENT
Start: 2018-11-19 | End: 2021-08-18

## 2018-11-19 ASSESSMENT — PATIENT HEALTH QUESTIONNAIRE - PHQ9: CLINICAL INTERPRETATION OF PHQ2 SCORE: 0

## 2018-11-19 NOTE — ASSESSMENT & PLAN NOTE
Ever since her Nissen fundoplication in July, she notes more flatulence, causing her embarrassment, when she walks through the house, turns over in bed, also causing causing some fecal incontinence. When she goes out like to a doctor's visit she now does not eat and takes gasex to control symptoms. Will try simethicone. She continues on omeprazole 20 mg.

## 2018-11-19 NOTE — ASSESSMENT & PLAN NOTE
She is being evaluated by ENT was told by ENT that once side of vocal cords is not reacting.   CT neck is normal.   She has a follow up with them in 3 months.   Voice is hoarse.

## 2018-11-19 NOTE — ASSESSMENT & PLAN NOTE
Bps at home are 150s/90s when sitting relaxed at home.   She notes flushing and feeling her head is heavy and when she checks bp it is elevated  Was on lisinopril-hctz before and then only on HCTZ so advised she should take lisinpril-hctz.   She has no chest pain, dyspnea on exertion, or LE edema.

## 2018-11-19 NOTE — ASSESSMENT & PLAN NOTE
Chronic condition, had myalgia with simvastatin. Also insurance did not cover fenofibrate.   She is currently on gemfibrozil 600mg bid   Results for JAX NARCISO GALINDO (MRN [id number]) as of 11/19/2018 14:19   Ref. Range 7/25/2018 08:06   Cholesterol,Tot Latest Ref Range: 100 - 199 mg/dL 298 (H)   Triglycerides Latest Ref Range: 0 - 149 mg/dL 101   HDL Latest Ref Range: >=40 mg/dL 57   LDL Latest Ref Range: <100 mg/dL 221 (H)   She continues on low fat healthy diet.

## 2018-11-20 ENCOUNTER — HOSPITAL ENCOUNTER (OUTPATIENT)
Dept: LAB | Facility: MEDICAL CENTER | Age: 68
End: 2018-11-20
Attending: FAMILY MEDICINE
Payer: MEDICARE

## 2018-11-20 DIAGNOSIS — R94.4 DECREASED GFR: ICD-10-CM

## 2018-11-20 LAB
ALBUMIN SERPL BCP-MCNC: 4.1 G/DL (ref 3.2–4.9)
ALBUMIN/GLOB SERPL: 1.2 G/DL
ALP SERPL-CCNC: 96 U/L (ref 30–99)
ALT SERPL-CCNC: 17 U/L (ref 2–50)
ANION GAP SERPL CALC-SCNC: 11 MMOL/L (ref 0–11.9)
AST SERPL-CCNC: 22 U/L (ref 12–45)
BILIRUB SERPL-MCNC: 0.4 MG/DL (ref 0.1–1.5)
BUN SERPL-MCNC: 18 MG/DL (ref 8–22)
CALCIUM SERPL-MCNC: 9.4 MG/DL (ref 8.5–10.5)
CHLORIDE SERPL-SCNC: 110 MMOL/L (ref 96–112)
CO2 SERPL-SCNC: 25 MMOL/L (ref 20–33)
CREAT SERPL-MCNC: 1.01 MG/DL (ref 0.5–1.4)
FASTING STATUS PATIENT QL REPORTED: NORMAL
GLOBULIN SER CALC-MCNC: 3.3 G/DL (ref 1.9–3.5)
GLUCOSE SERPL-MCNC: 104 MG/DL (ref 65–99)
POTASSIUM SERPL-SCNC: 3.6 MMOL/L (ref 3.6–5.5)
PROT SERPL-MCNC: 7.4 G/DL (ref 6–8.2)
SODIUM SERPL-SCNC: 146 MMOL/L (ref 135–145)

## 2018-11-20 PROCEDURE — 36415 COLL VENOUS BLD VENIPUNCTURE: CPT

## 2018-11-20 PROCEDURE — 80053 COMPREHEN METABOLIC PANEL: CPT

## 2018-11-20 NOTE — PROGRESS NOTES
Subjective:   Hanna Camara is a 68 y.o. female here today for evaluation and management of:     Hypertension  Bps at home are 150s/90s when sitting relaxed at home.   She notes flushing and feeling her head is heavy and when she checks bp it is elevated  Was on lisinopril-hctz before and then only on HCTZ so advised she should take lisinpril-hctz.   She has no chest pain, dyspnea on exertion, or LE edema.     Flatulence  Ever since her Nissen fundoplication in July, she notes more flatulence, causing her embarrassment, when she walks through the house, turns over in bed, also causing causing some fecal incontinence. When she goes out like to a doctor's visit she now does not eat and takes gasex to control symptoms. Will try simethicone. She continues on omeprazole 20 mg.     Hyperlipidemia  Chronic condition, had myalgia with simvastatin. Also insurance did not cover fenofibrate.   She is currently on gemfibrozil 600mg bid   Results for HANNA CAMARA (MRN [id number]) as of 11/19/2018 14:19   Ref. Range 7/25/2018 08:06   Cholesterol,Tot Latest Ref Range: 100 - 199 mg/dL 298 (H)   Triglycerides Latest Ref Range: 0 - 149 mg/dL 101   HDL Latest Ref Range: >=40 mg/dL 57   LDL Latest Ref Range: <100 mg/dL 221 (H)   She continues on low fat healthy diet.       Hoarseness of voice  She is being evaluated by ENT was told by ENT that once side of vocal cords is not reacting.   CT neck is normal.   She has a follow up with them in 3 months.   Voice is hoarse.            Current medicines (including changes today)  Current Outpatient Prescriptions   Medication Sig Dispense Refill   • simethicone (MYLICON) 80 MG Chew Tab Take 1 Tab by mouth every 6 hours as needed for Flatulence. 30 Tab 6   • acetaminophen (TYLENOL) 325 MG Tab Take 650 mg by mouth every four hours as needed.     • Artificial Tear Ointment (DRY EYES OP) Place 1 Drop in both eyes 1 time daily as needed.     • multivitamin (THERAGRAN) Tab Take 1 Tab by  "mouth every day.     • hydrochlorothiazide (MICROZIDE) 12.5 MG capsule Take 1 Cap by mouth every day. 90 Cap 3   • celecoxib (CELEBREX) 200 MG Cap TAKE ONE CAPSULE BY MOUTH TWICE DAILY 180 Cap 3   • cyclobenzaprine (FLEXERIL) 10 MG Tab TAKE ONE TABLET BY MOUTH THREE TIMES DAILY AS NEEDED FOR MILD PAIN 90 Tab 3   • gabapentin (NEURONTIN) 300 MG Cap TAKE TWO CAPSULES BY MOUTH ONCE DAILY IN THE MORNING AND THREE CAPSULES IN THE EVENING 450 Cap 3   • omeprazole (PRILOSEC) 20 MG delayed-release capsule TAKE ONE CAPSULE BY MOUTH TWICE DAILY 180 Cap 3   • albuterol (VENTOLIN OR PROVENTIL) 108 (90 BASE) MCG/ACT AERS Inhale 2 Puffs by mouth every 6 hours as needed for Shortness of Breath. 8.5 g 3     No current facility-administered medications for this visit.      She  has a past medical history of Arthritis; Breath shortness; Bronchitis; CAD (coronary artery disease); Dental disorder (07/2018); DJD (degenerative joint disease); Fibromyalgia; GERD (gastroesophageal reflux disease); Heart burn; Heart murmur; Hepatitis B; Hiatus hernia syndrome; High cholesterol; Hyperlipidemia; Hypertension; IBD (inflammatory bowel disease); Indigestion; and Pain.    ROS  No chest pain, no shortness of breath, no abdominal pain       Objective:     Blood pressure 148/70, pulse 81, temperature 36.7 °C (98.1 °F), temperature source Temporal, resp. rate 18, height 1.676 m (5' 6\"), weight 89 kg (196 lb 3.2 oz), SpO2 94 %, not currently breastfeeding. Body mass index is 31.67 kg/m².   Physical Exam:  Constitutional: Alert, no distress. Hoarse voice at baseline.  Skin: Warm, dry, good turgor, no rashes in visible areas.  Eye: Equal, round and reactive, conjunctiva clear, lids normal.  ENMT: Lips without lesions, good dentition, oropharynx clear.  Neck: Trachea midline, no masses, no thyromegaly. No cervical or supraclavicular lymphadenopathy  Respiratory: Unlabored respiratory effort, lungs clear to auscultation, no wheezes, no " iban.  Cardiovascular: Normal S1, S2, no murmur, no edema.  Abdomen: Soft, non-tender, no masses, no hepatosplenomegaly.  Psych: Alert and oriented x3, normal affect and mood.        Assessment and Plan:   The following treatment plan was discussed    1. Essential hypertension  Uncontrolled.   Restart lisinopril with Hctz  - COMP METABOLIC PANEL; Future    2. Flatulence  rx for simethicone provided.     3. Mixed hyperlipidemia  Uncontrolled. If worsening refer to lipid clinic.   - Lipid Profile; Future    4. Hoarseness of voice  Due to vocal cord dysfunction. Continue follow up with ENT.     5. Vitamin D deficiency  Continue vit D supplementation.   - VITAMIN D,25 HYDROXY; Future      Followup: Return in about 6 months (around 5/19/2019) for hoarseness, dyslipidemia, HTN.

## 2018-12-31 NOTE — PROGRESS NOTES
Hanna,  Your labs show improvement in kidney function! Continue to stay well hydrated with water.   Fernando Irwin M.D.

## 2019-01-18 ENCOUNTER — HOSPITAL ENCOUNTER (OUTPATIENT)
Dept: RADIOLOGY | Facility: MEDICAL CENTER | Age: 69
End: 2019-01-18
Attending: FAMILY MEDICINE
Payer: MEDICARE

## 2019-01-18 DIAGNOSIS — Z12.31 BREAST CANCER SCREENING BY MAMMOGRAM: ICD-10-CM

## 2019-01-18 PROCEDURE — 77063 BREAST TOMOSYNTHESIS BI: CPT

## 2019-01-23 NOTE — PROGRESS NOTES
Hanna  Your mammogram was normal! Next is due in one year.   Please let me know immediately if you notice any new lumps/rashes/pain/nipple discharge.  Fernando Irwin M.D.

## 2019-02-20 DIAGNOSIS — M15.9 OSTEOARTHRITIS OF MULTIPLE JOINTS, UNSPECIFIED OSTEOARTHRITIS TYPE: ICD-10-CM

## 2019-02-21 DIAGNOSIS — E78.2 MIXED HYPERLIPIDEMIA: ICD-10-CM

## 2019-02-22 RX ORDER — GABAPENTIN 300 MG/1
CAPSULE ORAL
Qty: 540 CAP | Refills: 0 | Status: SHIPPED | OUTPATIENT
Start: 2019-02-22 | End: 2019-09-30 | Stop reason: SDUPTHER

## 2019-02-22 NOTE — TELEPHONE ENCOUNTER
*MED WAS D/C 08/22/2018*  Was the patient seen in the last year in this department? Yes    Does patient have an active prescription for medications requested? No     Received Request Via: Pharmacy      Pt met protocol?: Yes    LAST OV 11/19/2018    BP Readings from Last 1 Encounters:   11/19/18 148/70       Lab Results  Component Value Date/Time   CHOLSTRLTOT 298 (H) 07/25/2018 0806       Lab Results  Component Value Date/Time   TRIGLYCERIDE 101 07/25/2018 0806       Lab Results  Component Value Date/Time   HDL 57 07/25/2018 0806       Lab Results  Component Value Date/Time    (H) 07/25/2018 0806

## 2019-02-25 RX ORDER — GEMFIBROZIL 600 MG/1
TABLET, FILM COATED ORAL
Refills: 3 | OUTPATIENT
Start: 2019-02-25

## 2019-02-25 NOTE — TELEPHONE ENCOUNTER
"Dr Irwin- This med was d/c'd 8/18 as \"pt not taking\" but is now requesting. Please refill as you see fit.    "

## 2019-03-02 DIAGNOSIS — E78.2 MIXED HYPERLIPIDEMIA: ICD-10-CM

## 2019-03-04 RX ORDER — GEMFIBROZIL 600 MG/1
TABLET, FILM COATED ORAL
Qty: 180 TAB | Refills: 0 | Status: SHIPPED | OUTPATIENT
Start: 2019-03-04 | End: 2019-04-08 | Stop reason: SDUPTHER

## 2019-04-04 RX ORDER — CELECOXIB 200 MG/1
CAPSULE ORAL
Qty: 180 CAP | Refills: 0 | Status: SHIPPED | OUTPATIENT
Start: 2019-04-04 | End: 2019-07-07 | Stop reason: SDUPTHER

## 2019-04-04 NOTE — TELEPHONE ENCOUNTER
Was the patient seen in the last year in this department? Yes    Does patient have an active prescription for medications requested? No     Received Request Via: Pharmacy      Pt met protocol?: Yes  LAST OV 11/19/2018

## 2019-04-08 DIAGNOSIS — E78.2 MIXED HYPERLIPIDEMIA: ICD-10-CM

## 2019-04-08 DIAGNOSIS — I10 ESSENTIAL HYPERTENSION: ICD-10-CM

## 2019-04-08 RX ORDER — LISINOPRIL AND HYDROCHLOROTHIAZIDE 12.5; 1 MG/1; MG/1
1 TABLET ORAL
Qty: 90 TAB | Refills: 1 | Status: SHIPPED | OUTPATIENT
Start: 2019-04-08 | End: 2019-10-09 | Stop reason: SDUPTHER

## 2019-04-08 NOTE — TELEPHONE ENCOUNTER
From: Hanna Marcelo  Sent: 4/8/2019 10:48 AM PDT  Subject: Medication Renewal Request    Hanna Marcelo would like a refill of the following medications:     gemfibrozil (LOPID) 600 MG Tab [Fernando Irwin M.D.]   Patient Comment: I am still out of this med ...it has been months since I have had any    Preferred pharmacy: [facility] [address] [place] [address]

## 2019-04-09 NOTE — TELEPHONE ENCOUNTER
See MA's notes below, pt has met protocol, OV 11/2018      Lab Results  Component Value Date/Time   CHOLSTRLTOT 298 (H) 07/25/2018 0806   TRIGLYCERIDE 101 07/25/2018 0806   HDL 57 07/25/2018 0806    (H) 07/25/2018 0806

## 2019-04-10 RX ORDER — GEMFIBROZIL 600 MG/1
600 TABLET, FILM COATED ORAL 2 TIMES DAILY
Qty: 180 TAB | Refills: 0 | Status: SHIPPED | OUTPATIENT
Start: 2019-04-10 | End: 2019-10-11

## 2019-05-04 DIAGNOSIS — K21.9 GASTROESOPHAGEAL REFLUX DISEASE WITHOUT ESOPHAGITIS: ICD-10-CM

## 2019-05-06 RX ORDER — OMEPRAZOLE 20 MG/1
CAPSULE, DELAYED RELEASE ORAL
Qty: 180 CAP | Refills: 0 | Status: SHIPPED | OUTPATIENT
Start: 2019-05-06 | End: 2019-08-05 | Stop reason: SDUPTHER

## 2019-05-06 NOTE — TELEPHONE ENCOUNTER
Was the patient seen in the last year in this department? Yes    Does patient have an active prescription for medications requested? No     Received Request Via: Pharmacy      Pt met protocol?: Yes, OV 11/18

## 2019-06-22 ENCOUNTER — HOSPITAL ENCOUNTER (OUTPATIENT)
Dept: LAB | Facility: MEDICAL CENTER | Age: 69
End: 2019-06-22
Attending: FAMILY MEDICINE
Payer: MEDICARE

## 2019-06-22 DIAGNOSIS — E55.9 VITAMIN D DEFICIENCY: ICD-10-CM

## 2019-06-22 DIAGNOSIS — E78.2 MIXED HYPERLIPIDEMIA: ICD-10-CM

## 2019-06-22 DIAGNOSIS — I10 ESSENTIAL HYPERTENSION: ICD-10-CM

## 2019-06-22 LAB
25(OH)D3 SERPL-MCNC: 21 NG/ML (ref 30–100)
ALBUMIN SERPL BCP-MCNC: 4.3 G/DL (ref 3.2–4.9)
ALBUMIN/GLOB SERPL: 1.4 G/DL
ALP SERPL-CCNC: 95 U/L (ref 30–99)
ALT SERPL-CCNC: 12 U/L (ref 2–50)
ANION GAP SERPL CALC-SCNC: 9 MMOL/L (ref 0–11.9)
AST SERPL-CCNC: 17 U/L (ref 12–45)
BILIRUB SERPL-MCNC: 0.6 MG/DL (ref 0.1–1.5)
BUN SERPL-MCNC: 16 MG/DL (ref 8–22)
CALCIUM SERPL-MCNC: 9.6 MG/DL (ref 8.5–10.5)
CHLORIDE SERPL-SCNC: 107 MMOL/L (ref 96–112)
CHOLEST SERPL-MCNC: 284 MG/DL (ref 100–199)
CO2 SERPL-SCNC: 24 MMOL/L (ref 20–33)
CREAT SERPL-MCNC: 1.01 MG/DL (ref 0.5–1.4)
GLOBULIN SER CALC-MCNC: 3 G/DL (ref 1.9–3.5)
GLUCOSE SERPL-MCNC: 87 MG/DL (ref 65–99)
HDLC SERPL-MCNC: 55 MG/DL
LDLC SERPL CALC-MCNC: 210 MG/DL
POTASSIUM SERPL-SCNC: 4 MMOL/L (ref 3.6–5.5)
PROT SERPL-MCNC: 7.3 G/DL (ref 6–8.2)
SODIUM SERPL-SCNC: 140 MMOL/L (ref 135–145)
TRIGL SERPL-MCNC: 94 MG/DL (ref 0–149)

## 2019-06-22 PROCEDURE — 80061 LIPID PANEL: CPT

## 2019-06-22 PROCEDURE — 82306 VITAMIN D 25 HYDROXY: CPT

## 2019-06-22 PROCEDURE — 80053 COMPREHEN METABOLIC PANEL: CPT

## 2019-06-22 PROCEDURE — 36415 COLL VENOUS BLD VENIPUNCTURE: CPT

## 2019-06-26 ENCOUNTER — HOSPITAL ENCOUNTER (OUTPATIENT)
Facility: MEDICAL CENTER | Age: 69
End: 2019-06-26
Attending: FAMILY MEDICINE
Payer: MEDICARE

## 2019-06-26 ENCOUNTER — OFFICE VISIT (OUTPATIENT)
Dept: MEDICAL GROUP | Facility: PHYSICIAN GROUP | Age: 69
End: 2019-06-26
Payer: MEDICARE

## 2019-06-26 VITALS
HEIGHT: 67 IN | HEART RATE: 89 BPM | TEMPERATURE: 97.8 F | OXYGEN SATURATION: 95 % | SYSTOLIC BLOOD PRESSURE: 126 MMHG | RESPIRATION RATE: 14 BRPM | DIASTOLIC BLOOD PRESSURE: 86 MMHG | BODY MASS INDEX: 29.82 KG/M2 | WEIGHT: 190 LBS

## 2019-06-26 DIAGNOSIS — Z23 NEED FOR VACCINATION: ICD-10-CM

## 2019-06-26 DIAGNOSIS — R94.4 DECREASED GFR: ICD-10-CM

## 2019-06-26 DIAGNOSIS — R35.89 POLYURIA: ICD-10-CM

## 2019-06-26 DIAGNOSIS — E78.2 MIXED HYPERLIPIDEMIA: ICD-10-CM

## 2019-06-26 LAB
APPEARANCE UR: NORMAL
BILIRUB UR STRIP-MCNC: NORMAL MG/DL
COLOR UR AUTO: YELLOW
GLUCOSE UR STRIP.AUTO-MCNC: NORMAL MG/DL
KETONES UR STRIP.AUTO-MCNC: NORMAL MG/DL
LEUKOCYTE ESTERASE UR QL STRIP.AUTO: NORMAL
NITRITE UR QL STRIP.AUTO: NORMAL
PH UR STRIP.AUTO: 5.5 [PH] (ref 5–8)
PROT UR QL STRIP: NORMAL MG/DL
RBC UR QL AUTO: NORMAL
SP GR UR STRIP.AUTO: 1.02
UROBILINOGEN UR STRIP-MCNC: 0.2 MG/DL

## 2019-06-26 PROCEDURE — 99214 OFFICE O/P EST MOD 30 MIN: CPT | Mod: 25 | Performed by: FAMILY MEDICINE

## 2019-06-26 PROCEDURE — 87077 CULTURE AEROBIC IDENTIFY: CPT

## 2019-06-26 PROCEDURE — 87086 URINE CULTURE/COLONY COUNT: CPT

## 2019-06-26 PROCEDURE — G0009 ADMIN PNEUMOCOCCAL VACCINE: HCPCS | Performed by: FAMILY MEDICINE

## 2019-06-26 PROCEDURE — 90732 PPSV23 VACC 2 YRS+ SUBQ/IM: CPT | Performed by: FAMILY MEDICINE

## 2019-06-26 PROCEDURE — 81002 URINALYSIS NONAUTO W/O SCOPE: CPT | Performed by: FAMILY MEDICINE

## 2019-06-26 PROCEDURE — 87186 SC STD MICRODIL/AGAR DIL: CPT

## 2019-06-26 RX ORDER — ERGOCALCIFEROL 1.25 MG/1
50000 CAPSULE ORAL
Qty: 12 CAP | Refills: 0 | Status: SHIPPED | OUTPATIENT
Start: 2019-06-26 | End: 2019-10-01

## 2019-06-26 RX ORDER — ROSUVASTATIN CALCIUM 10 MG/1
10 TABLET, COATED ORAL EVERY EVENING
Qty: 90 TAB | Refills: 3 | Status: SHIPPED | OUTPATIENT
Start: 2019-06-26 | End: 2020-07-13 | Stop reason: SDUPTHER

## 2019-06-26 RX ORDER — ZOSTER VACCINE RECOMBINANT, ADJUVANTED 50 MCG/0.5
KIT INTRAMUSCULAR
Refills: 0 | COMMUNITY
Start: 2019-04-12 | End: 2019-06-26

## 2019-06-26 RX ORDER — NITROFURANTOIN 25; 75 MG/1; MG/1
100 CAPSULE ORAL EVERY 12 HOURS
Qty: 10 CAP | Refills: 0 | Status: SHIPPED | OUTPATIENT
Start: 2019-06-26 | End: 2019-07-01

## 2019-06-26 ASSESSMENT — PATIENT HEALTH QUESTIONNAIRE - PHQ9
5. POOR APPETITE OR OVEREATING: 1 - SEVERAL DAYS
CLINICAL INTERPRETATION OF PHQ2 SCORE: 3
SUM OF ALL RESPONSES TO PHQ QUESTIONS 1-9: 9

## 2019-06-26 NOTE — PROGRESS NOTES
"Chief Complaint   Patient presents with   • Hypertension     FV   • Hyperlipidemia     FV   • Fatigue     x 3 months   • Alopecia     x year   • Nail Problem     Brittle x march   • Polyuria     x 2 months       HPI:  Symptom onset:  Two months ago   Current symptoms: feels hot when she urinates, urgent, frequent voids. No blood noted in urine. Some incontinence episodes.   Since onset symptoms are: Unchanged  Treatments tried: none  Associated symptoms: Negative for fever, flank pain, nausea and vomiting, vaginal discharge, pelvic pain.  History is positive for frequent UTI when she was younger but not recently.     ROS:  Denies fever, chills, vomiting or abdominal pain.     OBJECTIVE:  /86 (BP Location: Right arm, Patient Position: Sitting, BP Cuff Size: Adult)   Pulse 89   Temp 36.6 °C (97.8 °F) (Temporal)   Resp 14   Ht 1.702 m (5' 7\")   Wt 86.2 kg (190 lb)   SpO2 95%   Gen: Alert, NAD.  Chest: Lungs clear to auscultation, CV RRR.  Abdomen: Soft, tender in suprapubic region. No CVAT. Normal bowel sounds.     Lab Results   Component Value Date    POCCOLOR Yellow 06/26/2019    POCAPPEAR cloudy 06/26/2019    POCLEUKEST Mod 06/26/2019    POCNITRITE Pos 06/26/2019    POCUROBILIGE 0.2 06/26/2019    POCPROTEIN Neg 06/26/2019    POCURPH 5.5 06/26/2019    POCBLOOD Trace 06/26/2019    POCSPGRV 1.025 06/26/2019    POCKETONES Neg 06/26/2019    POCBILIRUBIN Neg 06/26/2019    POCGLUCUA Neg 06/26/2019          ASSESSMENT/PLAN:     1. Polyuria    treat with macrobid x 5 days     1. Abnormal urine dipstick in office. Urine sent for culture. Start antibiotics.  2. Provided education to drink plenty of fluids, wipe front to back every void and bowel movement.   3. Return to clinic if symptoms not improving within 3-4 days or in case of vomiting, fever, increasing pain.  Hyperlipidemia  Patient had significant pain with simvastatin   Will start crestor and monitor for any adverse side effects. She will stop the medicine " if she has muscle pain.   Continue with gemfibrozil.     Fernando Irwin M.D.

## 2019-06-26 NOTE — ASSESSMENT & PLAN NOTE
Patient had significant pain with simvastatin   Will start crestor and monitor for any adverse side effects. She will stop the medicine if she has muscle pain.   Continue with gemfibrozil.

## 2019-06-29 NOTE — PROGRESS NOTES
Hanna,  The urine culture showed E. Coli bacteria caused your UTI. The antibiotic was correct for it so no changes needed.   Fernando Irwin M.D.

## 2019-07-09 RX ORDER — CELECOXIB 200 MG/1
CAPSULE ORAL
Qty: 180 CAP | Refills: 0 | Status: SHIPPED | OUTPATIENT
Start: 2019-07-09 | End: 2019-10-09 | Stop reason: SDUPTHER

## 2019-07-09 NOTE — PROGRESS NOTES
ACTIVITY: Rest and take it easy for the first 24 hours.  A responsible adult is recommended to remain with you during that time.  It is normal to feel sleepy.  We encourage you to not do anything that requires balance, judgment or coordination.    MILD FLU-LIKE SYMPTOMS ARE NORMAL. YOU MAY EXPERIENCE GENERALIZED MUSCLE ACHES, THROAT IRRITATION, HEADACHE AND/OR SOME NAUSEA.    FOR 24 HOURS DO NOT:  Drive, operate machinery or run household appliances.  Drink beer or alcoholic beverages.   Make important decisions or sign legal documents.      DIET: To avoid nausea, slowly advance diet as tolerated, avoiding spicy or greasy foods for the first day.  Add more substantial food to your diet according to your physician's instructions.  INCREASE FLUIDS AND FIBER TO AVOID CONSTIPATION.    SURGICAL DRESSING/BATHING: leave dressing in place for 24 hours, may shower once removed    FOLLOW-UP APPOINTMENT:  A follow-up appointment should be arranged with Dr. Ashley; call to schedule.    You should CALL YOUR PHYSICIAN if you develop:  Fever greater than 101 degrees F.  Pain not relieved by medication, or persistent nausea or vomiting.  Excessive bleeding (blood soaking through dressing) or unexpected drainage from the wound.  Extreme redness or swelling around the incision site, drainage of pus or foul smelling drainage.  Inability to urinate or empty your bladder within 8 hours.  Problems with breathing or chest pain.    You should call 911 if you develop problems with breathing or chest pain.  If you are unable to contact your doctor or surgical center, you should go to the nearest emergency room or urgent care center.  Physician's telephone #: [phone]    If any questions arise, call your doctor.  If your doctor is not available, please feel free to call the Surgical Center at [phone].  The Center is open Monday through Friday from 7AM to 7PM.  You can also call the [facility] HOTLINE open 24 hours/day, 7 days/week and  Subjective:   Hanna Marcelo is a 67 y.o. female here today for evaluation and management of:     Chronic fatigue  Since the flu 2 months ago patient notes increased fatigue, tiredness, short of breath.   She had a normal NM cardiac stress test in 2013, she had a stable chest CT.   She also notes dizziness where she has to stop, hold onto something, lasts for a few minutes and she is tired afterwards. She is fully conscious, feels woozy and then it goes away.   She notes chronic allergies: uses claritin, flonase  Will check echo  She had normal cbc, cmp    GERD (gastroesophageal reflux disease)  Severe reflux, with hoarseness of voice, can't sleep laying down as the reflux wakes her up  Will refer to surgery for treatment of hiatal hernia  Continues on prilosec 20 mg bid and ganvescon      Visual changes  She has chronic vision changes and had numerous evaluations with eye specialists.   She recently had sharp pain in right eye followed by bright flashes of light and now with a floater.   Will refer to ophthalmology urgently. Advised on er precautions.          Current medicines (including changes today)  Current Outpatient Prescriptions   Medication Sig Dispense Refill   • celecoxib (CELEBREX) 200 MG Cap TAKE ONE CAPSULE BY MOUTH TWICE DAILY 180 Cap 3   • cyclobenzaprine (FLEXERIL) 10 MG Tab TAKE ONE TABLET BY MOUTH THREE TIMES DAILY AS NEEDED FOR MILD PAIN 90 Tab 3   • fluticasone (FLONASE) 50 MCG/ACT nasal spray Spray 1 Spray in nose every day. 16 g 11   • gabapentin (NEURONTIN) 300 MG Cap TAKE TWO CAPSULES BY MOUTH ONCE DAILY IN THE MORNING AND THREE CAPSULES IN THE EVENING 450 Cap 3   • gemfibrozil (LOPID) 600 MG Tab TAKE ONE TABLET BY MOUTH TWICE DAILY 180 Tab 3   • lisinopril-hydrochlorothiazide (PRINZIDE, ZESTORETIC) 10-12.5 MG per tablet Take 1 Tab by mouth every day. 90 Tab 3   • omeprazole (PRILOSEC) 20 MG delayed-release capsule TAKE ONE CAPSULE BY MOUTH TWICE DAILY 180 Cap 3   • albuterol (VENTOLIN OR  "PROVENTIL) 108 (90 BASE) MCG/ACT AERS Inhale 2 Puffs by mouth every 6 hours as needed for Shortness of Breath. 8.5 g 3   • ondansetron (ZOFRAN ODT) 4 MG TABLET DISPERSIBLE Take 1 Tab by mouth every 8 hours as needed. 10 Tab 0     No current facility-administered medications for this visit.      She  has a past medical history of Arthritis; CAD (coronary artery disease); Fibromyalgia; GERD (gastroesophageal reflux disease); Hyperlipidemia; Hypertension; and IBD (inflammatory bowel disease).    ROS  No chest pain, no shortness of breath, no abdominal pain       Objective:     Blood pressure 128/78, pulse 84, temperature 36.7 °C (98.1 °F), resp. rate 16, height 1.715 m (5' 7.5\"), weight 90.7 kg (200 lb), SpO2 97 %. Body mass index is 30.86 kg/m².   Physical Exam:  Constitutional: Alert, no distress.  Skin: Warm, dry, good turgor, no rashes in visible areas.  Eye: Equal, round and reactive, conjunctiva clear, lids normal.  ENMT: Lips without lesions, good dentition, oropharynx clear.  Neck: Trachea midline, no masses, no thyromegaly. No cervical or supraclavicular lymphadenopathy  Respiratory: Unlabored respiratory effort, lungs clear to auscultation, no wheezes, no ronchi.  Cardiovascular: Normal S1, S2, no murmur, no edema.  Abdomen: Soft, non-tender, no masses, no hepatosplenomegaly.  Psych: Alert and oriented x3, normal affect and mood.        Assessment and Plan:   The following treatment plan was discussed    1. Chronic fatigue  May benefit from sleep study.   - ECHO-REST/STRESS W/O CONTRAST; Future    2. Gastroesophageal reflux disease, esophagitis presence not specified  - REFERRAL TO GENERAL SURGERY  - REFERRAL TO GASTROENTEROLOGY    3. Hiatal hernia  - REFERRAL TO GENERAL SURGERY  - REFERRAL TO GASTROENTEROLOGY    4. Visual changes  - REFERRAL TO OPHTHALMOLOGY    5. Floaters in visual field, right  Urgent referral to ophthalmology, ER precautions given.     6. Muscle spasm  - cyclobenzaprine (FLEXERIL) 10 MG " speak to a nurse at [phone], or toll free at [phone].    A registered nurse may call you a few days after your surgery to see how you are doing after your procedure.    MEDICATIONS: Resume taking daily medication.  Take prescribed pain medication with food.  If no medication is prescribed, you may take non-aspirin pain medication if needed.  PAIN MEDICATION CAN BE VERY CONSTIPATING.  Take a stool softener or laxative such as senokot, pericolace, or milk of magnesia if needed.    If your physician has prescribed pain medication that includes Acetaminophen (Tylenol), do not take additional Acetaminophen (Tylenol) while taking the prescribed medication.    Discharge Instructions for Kidney Biopsy  You had a procedure called a kidney biopsy. Your healthcare provider used a special needle to remove a small piece of tissue from your kidney to examine it for signs of damage and disease. A kidney biopsy is ordered after other tests have shown that there may be a problem with your kidney. Kidney biopsies are also performed when kidney disease is suspected and to rule out cancer.  Home care  · Rest for 24 hours to 48 hours. Get up only to use the bathroom.  · Don’t drive for 24 hours to 48 hours after the procedure.  · Don’t shower for 24 hours after the biopsy. If you wish, you may wash yourself with a sponge or washcloth. When you are able to shower, don’t scrub the site. Gently wash the area and pat it dry.  · Remove the bandage covering the biopsy site 24 hours to 48 hours after the procedure.  · Don’t lift anything heavier than 10 pounds for 3 days to 4 days after the procedure.  · Ask your healthcare provider when you can return to work. Be sure to tell your healthcare provider if your job involves heavy lifting.  · If you normally take blood thinner medicines (anticoagulants or antiplatelet medicines) and you stopped taking them a few days before your procedure, ask your healthcare provider when to start  Tab; TAKE ONE TABLET BY MOUTH THREE TIMES DAILY AS NEEDED FOR MILD PAIN  Dispense: 90 Tab; Refill: 3    7. Environmental allergies  - fluticasone (FLONASE) 50 MCG/ACT nasal spray; Spray 1 Spray in nose every day.  Dispense: 16 g; Refill: 11    8. Chronic maxillary sinusitis  - fluticasone (FLONASE) 50 MCG/ACT nasal spray; Spray 1 Spray in nose every day.  Dispense: 16 g; Refill: 11    9. Osteoarthritis of multiple joints, unspecified osteoarthritis type  - gabapentin (NEURONTIN) 300 MG Cap; TAKE TWO CAPSULES BY MOUTH ONCE DAILY IN THE MORNING AND THREE CAPSULES IN THE EVENING  Dispense: 450 Cap; Refill: 3    10. Mixed hyperlipidemia  - gemfibrozil (LOPID) 600 MG Tab; TAKE ONE TABLET BY MOUTH TWICE DAILY  Dispense: 180 Tab; Refill: 3    11. Essential hypertension  Controlled.   - lisinopril-hydrochlorothiazide (PRINZIDE, ZESTORETIC) 10-12.5 MG per tablet; Take 1 Tab by mouth every day.  Dispense: 90 Tab; Refill: 3    12. Gastroesophageal reflux disease without esophagitis  Uncontrolled.   - omeprazole (PRILOSEC) 20 MG delayed-release capsule; TAKE ONE CAPSULE BY MOUTH TWICE DAILY  Dispense: 180 Cap; Refill: 3    13. Need for vaccination  - Prevnar 13 PCV-13    14. Chest pressure  - ECHO-REST/STRESS W/O CONTRAST; Future      Followup: Return in about 3 months (around 5/12/2018) for floater, fatigue, gerd.          taking them again.    When to call your healthcare provider  Call your healthcare provider right away if you have any of the following:  · Blood in your urine  · Exhaustion or extreme weakness  · Dizziness or lightheadedness  · Sudden or increased shortness of breath  · Sudden chest pain  · Fever of 100.4°F (38°C) or higher, or as directed by your healthcare provider  · Chills  · Increasing redness, tenderness, or swelling at the biopsy site  · Opening of or drainage or bleeding from the biopsy site  · Increasing pain, with or without activity         Depression / Suicide Risk    As you are discharged from this [facility] facility, it is important to learn how to keep safe from harming yourself.    Recognize the warning signs:  · Abrupt changes in personality, positive or negative- including increase in energy   · Giving away possessions  · Change in eating patterns- significant weight changes-  positive or negative  · Change in sleeping patterns- unable to sleep or sleeping all the time   · Unwillingness or inability to communicate  · Depression  · Unusual sadness, discouragement and loneliness  · Talk of wanting to die  · Neglect of personal appearance   · Rebelliousness- reckless behavior  · Withdrawal from people/activities they love  · Confusion- inability to concentrate     If you or a loved one observes any of these behaviors or has concerns about self-harm, here's what you can do:  · Talk about it- your feelings and reasons for harming yourself  · Remove any means that you might use to hurt yourself (examples: pills, rope, extension cords, firearm)  · Get professional help from the community (Mental Health, Substance Abuse, psychological counseling)  · Do not be alone:Call your Safe Contact- someone whom you trust who will be there for you.  · Call your local CRISIS HOTLINE 503-0551 or 606-341-9539  · Call your local Children's Mobile Crisis Response Team [place] (804) 827-8771 or  www.[employer]Show  · Call the toll free National Suicide Prevention Hotlines   · National Suicide Prevention Lifeline 255-224-UOAS (8483)  · National Hope Line Network 800-SUICIDE (393-8726)

## 2019-08-05 DIAGNOSIS — K21.9 GASTROESOPHAGEAL REFLUX DISEASE WITHOUT ESOPHAGITIS: ICD-10-CM

## 2019-08-06 RX ORDER — OMEPRAZOLE 20 MG/1
CAPSULE, DELAYED RELEASE ORAL
Qty: 180 CAP | Refills: 0 | Status: SHIPPED | OUTPATIENT
Start: 2019-08-06 | End: 2019-10-30 | Stop reason: SDUPTHER

## 2019-09-26 ENCOUNTER — TELEPHONE (OUTPATIENT)
Dept: MEDICAL GROUP | Facility: PHYSICIAN GROUP | Age: 69
End: 2019-09-26

## 2019-09-26 NOTE — TELEPHONE ENCOUNTER
Pt is requesting for Provider to clear her for surgery on her R shoulder. Pt states that she cannot schedule the surgery until she is cleared  Pt was last seen on 6/26/19.

## 2019-09-27 NOTE — TELEPHONE ENCOUNTER
Phone Number Called: [phone]    Call outcome: spoke to patient regarding message below    Message: scheduled an appt with Tracy for surgery clearance 10/1/19 10:40

## 2019-09-30 DIAGNOSIS — M15.9 OSTEOARTHRITIS OF MULTIPLE JOINTS, UNSPECIFIED OSTEOARTHRITIS TYPE: ICD-10-CM

## 2019-09-30 RX ORDER — GABAPENTIN 300 MG/1
CAPSULE ORAL
Qty: 450 CAP | Refills: 0 | Status: SHIPPED | OUTPATIENT
Start: 2019-09-30 | End: 2019-10-30 | Stop reason: SDUPTHER

## 2019-09-30 NOTE — TELEPHONE ENCOUNTER
Was the patient seen in the last year in this department? Yes    Does patient have an active prescription for medications requested? No     Received Request Via: Pharmacy      Pt met protocol?: Yes   Pt last ov 6/19

## 2019-10-01 ENCOUNTER — OFFICE VISIT (OUTPATIENT)
Dept: MEDICAL GROUP | Facility: PHYSICIAN GROUP | Age: 69
End: 2019-10-01
Payer: MEDICARE

## 2019-10-01 VITALS
OXYGEN SATURATION: 94 % | RESPIRATION RATE: 12 BRPM | TEMPERATURE: 98.7 F | SYSTOLIC BLOOD PRESSURE: 122 MMHG | HEART RATE: 89 BPM | WEIGHT: 194.6 LBS | HEIGHT: 66 IN | DIASTOLIC BLOOD PRESSURE: 78 MMHG | BODY MASS INDEX: 31.27 KG/M2

## 2019-10-01 DIAGNOSIS — Z01.818 PRE-OP EXAMINATION: ICD-10-CM

## 2019-10-01 DIAGNOSIS — Z11.59 NEED FOR HEPATITIS C SCREENING TEST: ICD-10-CM

## 2019-10-01 DIAGNOSIS — G89.29 CHRONIC RIGHT SHOULDER PAIN: ICD-10-CM

## 2019-10-01 DIAGNOSIS — L67.9 HAIR CHANGES: ICD-10-CM

## 2019-10-01 DIAGNOSIS — R91.8 ABNORMAL CT SCAN, LUNG: ICD-10-CM

## 2019-10-01 DIAGNOSIS — Z23 NEED FOR VACCINATION: ICD-10-CM

## 2019-10-01 DIAGNOSIS — M25.511 CHRONIC RIGHT SHOULDER PAIN: ICD-10-CM

## 2019-10-01 PROCEDURE — 99214 OFFICE O/P EST MOD 30 MIN: CPT | Performed by: NURSE PRACTITIONER

## 2019-10-01 NOTE — LETTER
PROCEDURE/SURGERY CLEARANCE FORM      Encounter Date: 10/1/2019    Patient: Hanna Marcelo  YOB: 1950    PCP:  RUDY Ying    REFERRING SURGEON:  Monika Cordova MD      The above patient is cleared to have the following procedure/surgery: Right shoulder arthroscopy, subacromial decompression, distal clavicle excision, biceps tenodesis                                           Additional comments: Patient is cleared pending the results of preoperative exams as ordered by surgeon including any blood work, chest x-ray or EKG ordered.  She is at low risk for the above surgery in the setting of her comorbid conditions                 Provider signature   ANNA Ying.

## 2019-10-01 NOTE — PROGRESS NOTES
No chief complaint on file.        This is a 69 y.o.female patient that presents today with the following: Surgical clearance    Pre-op examination  Patient here for preoperative examination and surgical clearance.  She is going to be scheduling right shoulder surgery, she is going to have right shoulder arthroscopy, subacromial decompression, distal clavicle excision and biceps tenodesis.  She is unable to schedule this until she has preoperative clearance.  She has had surgery before and tolerates anesthesia without difficulty.  She does deny shortness of breath or chest pain.  She does not have a family history of malignant hyperthermia associated with anesthesia.  She does understand that she will likely have labs ordered as well as EKG and chest x-ray as ordered by her surgeon.  She was advised to follow all preoperative instructions as sent by her surgeon.  Did discuss with her that I feel she is at low risk for the above proposed surgery in the setting of her comorbid conditions.    Hair changes  Patient does note hair and skin changes, she does notice hair loss and very dry and brittle nails and break easily.  She has been checked for thyroid disorder in the past and would like to be checked again, labs have been ordered.  She does deny excessive weight gain, cold intolerance, issues with constipation.    Chronic right shoulder pain  See additional notes, surgical intervention planned, preoperative examination and surgical clearance letter sent to surgeon    Abnormal CT scan, lung  Patient has history of pulmonary nodules for which she was having yearly CT scans.  The last CT scan she had done was in 2016 which it showed 2 stable nodules for which she was at low risk.  I did discuss with her that when she sees her regular PCP at the end of October they do need to discuss whether or not she should continue with routine CT scans.  She does deny symptoms associated with this.      No visits with results within  1 Month(s) from this visit.   Latest known visit with results is:   Hospital Outpatient Visit on 06/26/2019   Component Date Value   • Significant Indicator 06/26/2019 POS*   • Source 06/26/2019 UR    • Site 06/26/2019 -    • Culture Result 06/26/2019 -*   • Culture Result 06/26/2019 *                    Value:Escherichia coli  >100,000 cfu/mL           clinical course has been stable    Past Medical History:   Diagnosis Date   • Arthritis     osteo   • Breath shortness    • Bronchitis    • CAD (coronary artery disease)    • Dental disorder 07/2018    oral surgery-teeth removal   • DJD (degenerative joint disease)    • Fibromyalgia    • GERD (gastroesophageal reflux disease)    • Heart burn    • Heart murmur     as a child   • Hepatitis B    • Hiatus hernia syndrome    • High cholesterol    • Hyperlipidemia    • Hypertension    • IBD (inflammatory bowel disease)    • Indigestion    • Pain     generalized       Past Surgical History:   Procedure Laterality Date   • NISSEN FUNDOPLICATION LAPAROSCOPIC N/A 7/31/2018    Procedure: NISSEN FUNDOPLICATION LAPAROSCOPIC;  Surgeon: John H Ganser, M.D.;  Location: SURGERY [facility];  Service: General   • FOOT SURGERY Right 2015   • ABDOMINAL HYSTERECTOMY TOTAL     • CARPAL TUNNEL RELEASE      R wrist   • CHOLECYSTECTOMY     • TONSILLECTOMY         Family History   Problem Relation Age of Onset   • Heart Disease Mother    • Cancer Mother         colorectal   • Breast Cancer Mother    • Colon Cancer Mother    • Heart Disease Father        Patient has no known allergies.    Current Outpatient Medications Ordered in Epic   Medication Sig Dispense Refill   • gabapentin (NEURONTIN) 300 MG Cap TAKE 2 CAPSULES BY MOUTH ONCE DAILY IN THE MORNING AND 3 CAPSULES IN THE EVENING 450 Cap 0   • omeprazole (PRILOSEC) 20 MG delayed-release capsule TAKE 1 CAPSULE BY MOUTH TWICE DAILY 180 Cap 0   • celecoxib (CELEBREX) 200 MG Cap TAKE 1 CAPSULE BY MOUTH TWICE DAILY 180 Cap 0   •  "rosuvastatin (CRESTOR) 10 MG Tab Take 1 Tab by mouth every evening. 90 Tab 3   • gemfibrozil (LOPID) 600 MG Tab Take 1 Tab by mouth 2 times a day. 180 Tab 0   • lisinopril-hydrochlorothiazide (PRINZIDE, ZESTORETIC) 10-12.5 MG per tablet Take 1 Tab by mouth every day. 90 Tab 1   • Artificial Tear Ointment (DRY EYES OP) Place 1 Drop in both eyes 1 time daily as needed.     • multivitamin (THERAGRAN) Tab Take 1 Tab by mouth every day.     • cyclobenzaprine (FLEXERIL) 10 MG Tab TAKE ONE TABLET BY MOUTH THREE TIMES DAILY AS NEEDED FOR MILD PAIN 90 Tab 3   • albuterol (VENTOLIN OR PROVENTIL) 108 (90 BASE) MCG/ACT AERS Inhale 2 Puffs by mouth every 6 hours as needed for Shortness of Breath. 8.5 g 3   • simethicone (MYLICON) 80 MG Chew Tab Take 1 Tab by mouth every 6 hours as needed for Flatulence. 30 Tab 6   • acetaminophen (TYLENOL) 325 MG Tab Take 650 mg by mouth every four hours as needed.       No current Epic-ordered facility-administered medications on file.        Constitutional ROS: No unexpected change in weight, No weakness, No unexplained fevers, sweats, or chills  Pulmonary ROS: No chronic cough, sputum, or hemoptysis, No shortness of breath, No recent change in breathing.  Positive for history of pulmonary nodules  Cardiovascular ROS: No chest pain, No edema, No palpitations  Gastrointestinal ROS: No abdominal pain, No nausea, vomiting, diarrhea, or constipation  Musculoskeletal/Extremities ROS: Positive per HPI  Neurologic ROS: Normal development, No seizures, No weakness    Physical exam:  /78 (BP Location: Left arm, Patient Position: Sitting, BP Cuff Size: Adult)   Pulse 89   Temp 37.1 °C (98.7 °F) (Temporal)   Resp 12   Ht 1.676 m (5' 6\")   Wt 88.3 kg (194 lb 9.6 oz)   SpO2 94%   BMI 31.41 kg/m²   General Appearance: Pleasant older female, alert, no distress, obese, well-groomed  Skin: Skin color, texture, turgor normal. No rashes or lesions.  Lungs: negative findings: normal respiratory rate " and rhythm, lungs clear to auscultation  Heart: negative. RRR without murmur, gallop, or rubs.  No ectopy.  Abdomen: Abdomen soft, non-tender. BS normal. No masses,  No organomegaly  Musculoskeletal: positive findings: Limited range of motion to right upper extremity/shoulder  Neurologic: intact    Medical decision making/discussion: Surgical clearance letter faxed to surgeon, she is to follow all preoperative instructions as sent by surgeon.  She is to keep upcoming appointment with her regular PCP at the end of the month.  Additional labs have been ordered.  She will come back another time on an MA visit for immunizations as this clinic is out of stock of high-dose influenza.    Diagnoses and all orders for this visit:    Pre-op examination    Chronic right shoulder pain    Hair changes  -     TSH WITH REFLEX TO FT4; Future    Abnormal CT scan, lung    Need for hepatitis C screening test  -     Hep C Virus Antibody; Future    Need for vaccination        Return in about 4 weeks (around 10/29/2019) for Follow-up.        Please note that this dictation was created using voice recognition software. I have made every reasonable attempt to correct obvious errors, but I expect that there are errors of grammar and possibly content that I did not discover before finalizing the note.

## 2019-10-01 NOTE — PATIENT INSTRUCTIONS
Will send surgical clearance to surgeon      Will check to see if you need to have repeat CT of chest to follow up on pulmonary nodule    Labs before you see Dr. XIONG at end of month

## 2019-10-02 PROBLEM — M25.511 CHRONIC RIGHT SHOULDER PAIN: Status: ACTIVE | Noted: 2019-10-02

## 2019-10-02 PROBLEM — G89.29 CHRONIC RIGHT SHOULDER PAIN: Status: ACTIVE | Noted: 2019-10-02

## 2019-10-02 PROBLEM — L67.9 HAIR CHANGES: Status: ACTIVE | Noted: 2019-10-02

## 2019-10-02 NOTE — ASSESSMENT & PLAN NOTE
Patient here for preoperative examination and surgical clearance.  She is going to be scheduling right shoulder surgery, she is going to have right shoulder arthroscopy, subacromial decompression, distal clavicle excision and biceps tenodesis.  She is unable to schedule this until she has preoperative clearance.  She has had surgery before and tolerates anesthesia without difficulty.  She does deny shortness of breath or chest pain.  She does not have a family history of malignant hyperthermia associated with anesthesia.  She does understand that she will likely have labs ordered as well as EKG and chest x-ray as ordered by her surgeon.  She was advised to follow all preoperative instructions as sent by her surgeon.  Did discuss with her that I feel she is at low risk for the above proposed surgery in the setting of her comorbid conditions.

## 2019-10-02 NOTE — ASSESSMENT & PLAN NOTE
Patient has history of pulmonary nodules for which she was having yearly CT scans.  The last CT scan she had done was in 2016 which it showed 2 stable nodules for which she was at low risk.  I did discuss with her that when she sees her regular PCP at the end of October they do need to discuss whether or not she should continue with routine CT scans.  She does deny symptoms associated with this.

## 2019-10-02 NOTE — ASSESSMENT & PLAN NOTE
See additional notes, surgical intervention planned, preoperative examination and surgical clearance letter sent to surgeon

## 2019-10-02 NOTE — ASSESSMENT & PLAN NOTE
Patient does note hair and skin changes, she does notice hair loss and very dry and brittle nails and break easily.  She has been checked for thyroid disorder in the past and would like to be checked again, labs have been ordered.  She does deny excessive weight gain, cold intolerance, issues with constipation.

## 2019-10-08 ENCOUNTER — HOSPITAL ENCOUNTER (OUTPATIENT)
Dept: LAB | Facility: MEDICAL CENTER | Age: 69
End: 2019-10-08
Attending: NURSE PRACTITIONER
Payer: MEDICARE

## 2019-10-08 DIAGNOSIS — L67.9 HAIR CHANGES: ICD-10-CM

## 2019-10-08 DIAGNOSIS — Z11.59 NEED FOR HEPATITIS C SCREENING TEST: ICD-10-CM

## 2019-10-08 LAB
HCV AB SER QL: NEGATIVE
TSH SERPL DL<=0.005 MIU/L-ACNC: 1.01 UIU/ML (ref 0.38–5.33)

## 2019-10-08 PROCEDURE — 86803 HEPATITIS C AB TEST: CPT

## 2019-10-08 PROCEDURE — 84443 ASSAY THYROID STIM HORMONE: CPT

## 2019-10-08 PROCEDURE — 36415 COLL VENOUS BLD VENIPUNCTURE: CPT

## 2019-10-09 DIAGNOSIS — I10 ESSENTIAL HYPERTENSION: ICD-10-CM

## 2019-10-09 DIAGNOSIS — E78.2 MIXED HYPERLIPIDEMIA: ICD-10-CM

## 2019-10-10 RX ORDER — LISINOPRIL AND HYDROCHLOROTHIAZIDE 12.5; 1 MG/1; MG/1
TABLET ORAL
Qty: 90 TAB | Refills: 0 | Status: SHIPPED | OUTPATIENT
Start: 2019-10-10 | End: 2019-10-30 | Stop reason: SDUPTHER

## 2019-10-10 NOTE — TELEPHONE ENCOUNTER
Was the patient seen in the last year in this department? Yes    Does patient have an active prescription for medications requested? No     Received Request Via: Pharmacy    Pt met protocol?: Yes     Last OV 10/01/19    BP Readings from Last 1 Encounters:   10/01/19 122/78

## 2019-10-10 NOTE — TELEPHONE ENCOUNTER
Was the patient seen in the last year in this department? Yes    Does patient have an active prescription for medications requested? No     Received Request Via: Pharmacy    Pt met protocol?: Yes     Last OV 10/01/19    Lab Results   Component Value Date/Time    CHOLSTRLTOT 284 (H) 06/22/2019 1037    TRIGLYCERIDE 94 06/22/2019 1037    HDL 55 06/22/2019 1037     (H) 06/22/2019 1037

## 2019-10-11 DIAGNOSIS — E78.2 MIXED HYPERLIPIDEMIA: ICD-10-CM

## 2019-10-11 RX ORDER — GEMFIBROZIL 600 MG/1
TABLET, FILM COATED ORAL
Qty: 180 TAB | Refills: 0 | Status: SHIPPED | OUTPATIENT
Start: 2019-10-11 | End: 2019-10-30 | Stop reason: SDUPTHER

## 2019-10-11 RX ORDER — CELECOXIB 200 MG/1
CAPSULE ORAL
Qty: 180 CAP | Refills: 0 | Status: SHIPPED | OUTPATIENT
Start: 2019-10-11 | End: 2019-10-30 | Stop reason: SDUPTHER

## 2019-10-18 ENCOUNTER — TELEPHONE (OUTPATIENT)
Dept: URGENT CARE | Facility: PHYSICIAN GROUP | Age: 69
End: 2019-10-18

## 2019-10-21 ENCOUNTER — NON-PROVIDER VISIT (OUTPATIENT)
Dept: MEDICAL GROUP | Facility: PHYSICIAN GROUP | Age: 69
End: 2019-10-21
Payer: MEDICARE

## 2019-10-21 PROCEDURE — 93000 ELECTROCARDIOGRAM COMPLETE: CPT | Performed by: NURSE PRACTITIONER

## 2019-10-23 DIAGNOSIS — I83.93 VARICOSE VEINS OF BOTH LOWER EXTREMITIES, UNSPECIFIED WHETHER COMPLICATED: ICD-10-CM

## 2019-10-30 ENCOUNTER — HOSPITAL ENCOUNTER (OUTPATIENT)
Dept: LAB | Facility: MEDICAL CENTER | Age: 69
End: 2019-10-30
Attending: FAMILY MEDICINE
Payer: MEDICARE

## 2019-10-30 ENCOUNTER — OFFICE VISIT (OUTPATIENT)
Dept: MEDICAL GROUP | Facility: PHYSICIAN GROUP | Age: 69
End: 2019-10-30
Payer: MEDICARE

## 2019-10-30 VITALS
BODY MASS INDEX: 31.27 KG/M2 | HEART RATE: 78 BPM | RESPIRATION RATE: 16 BRPM | DIASTOLIC BLOOD PRESSURE: 80 MMHG | SYSTOLIC BLOOD PRESSURE: 110 MMHG | HEIGHT: 66 IN | OXYGEN SATURATION: 97 % | TEMPERATURE: 98 F | WEIGHT: 194.6 LBS

## 2019-10-30 DIAGNOSIS — M15.9 OSTEOARTHRITIS OF MULTIPLE JOINTS, UNSPECIFIED OSTEOARTHRITIS TYPE: ICD-10-CM

## 2019-10-30 DIAGNOSIS — Z23 NEED FOR VACCINATION: ICD-10-CM

## 2019-10-30 DIAGNOSIS — I10 ESSENTIAL HYPERTENSION: ICD-10-CM

## 2019-10-30 DIAGNOSIS — M62.838 MUSCLE SPASM: ICD-10-CM

## 2019-10-30 DIAGNOSIS — K21.9 GASTROESOPHAGEAL REFLUX DISEASE WITHOUT ESOPHAGITIS: ICD-10-CM

## 2019-10-30 DIAGNOSIS — E78.2 MIXED HYPERLIPIDEMIA: ICD-10-CM

## 2019-10-30 DIAGNOSIS — R23.8 OTHER SKIN CHANGES: ICD-10-CM

## 2019-10-30 LAB
ALBUMIN SERPL BCP-MCNC: 4.4 G/DL (ref 3.2–4.9)
ALBUMIN/GLOB SERPL: 1.5 G/DL
ALP SERPL-CCNC: 104 U/L (ref 30–99)
ALT SERPL-CCNC: 16 U/L (ref 2–50)
ANION GAP SERPL CALC-SCNC: 13 MMOL/L (ref 0–11.9)
AST SERPL-CCNC: 21 U/L (ref 12–45)
BILIRUB SERPL-MCNC: 0.6 MG/DL (ref 0.1–1.5)
BUN SERPL-MCNC: 16 MG/DL (ref 8–22)
CALCIUM SERPL-MCNC: 9.5 MG/DL (ref 8.5–10.5)
CHLORIDE SERPL-SCNC: 108 MMOL/L (ref 96–112)
CHOLEST SERPL-MCNC: 219 MG/DL (ref 100–199)
CO2 SERPL-SCNC: 24 MMOL/L (ref 20–33)
CREAT SERPL-MCNC: 1 MG/DL (ref 0.5–1.4)
FASTING STATUS PATIENT QL REPORTED: NORMAL
GLOBULIN SER CALC-MCNC: 2.9 G/DL (ref 1.9–3.5)
GLUCOSE SERPL-MCNC: 87 MG/DL (ref 65–99)
HDLC SERPL-MCNC: 56 MG/DL
LDLC SERPL CALC-MCNC: 144 MG/DL
POTASSIUM SERPL-SCNC: 3.3 MMOL/L (ref 3.6–5.5)
PROT SERPL-MCNC: 7.3 G/DL (ref 6–8.2)
SODIUM SERPL-SCNC: 145 MMOL/L (ref 135–145)
TRIGL SERPL-MCNC: 97 MG/DL (ref 0–149)

## 2019-10-30 PROCEDURE — 90662 IIV NO PRSV INCREASED AG IM: CPT | Performed by: FAMILY MEDICINE

## 2019-10-30 PROCEDURE — G0008 ADMIN INFLUENZA VIRUS VAC: HCPCS | Performed by: FAMILY MEDICINE

## 2019-10-30 PROCEDURE — 99214 OFFICE O/P EST MOD 30 MIN: CPT | Mod: 25 | Performed by: FAMILY MEDICINE

## 2019-10-30 PROCEDURE — 80061 LIPID PANEL: CPT

## 2019-10-30 PROCEDURE — 80053 COMPREHEN METABOLIC PANEL: CPT

## 2019-10-30 PROCEDURE — 36415 COLL VENOUS BLD VENIPUNCTURE: CPT

## 2019-10-30 RX ORDER — GEMFIBROZIL 600 MG/1
TABLET, FILM COATED ORAL
Qty: 180 TAB | Refills: 3 | Status: SHIPPED | OUTPATIENT
Start: 2019-10-30 | End: 2021-01-07

## 2019-10-30 RX ORDER — GABAPENTIN 300 MG/1
CAPSULE ORAL
Qty: 450 CAP | Refills: 0 | Status: SHIPPED | OUTPATIENT
Start: 2019-10-30 | End: 2021-01-07

## 2019-10-30 RX ORDER — CYCLOBENZAPRINE HCL 10 MG
TABLET ORAL
Qty: 270 TAB | Refills: 3 | Status: SHIPPED | OUTPATIENT
Start: 2019-10-30 | End: 2021-05-18 | Stop reason: SDUPTHER

## 2019-10-30 RX ORDER — OMEPRAZOLE 20 MG/1
CAPSULE, DELAYED RELEASE ORAL
Qty: 180 CAP | Refills: 3 | Status: SHIPPED | OUTPATIENT
Start: 2019-10-30 | End: 2020-11-17

## 2019-10-30 RX ORDER — CELECOXIB 200 MG/1
CAPSULE ORAL
Qty: 180 CAP | Refills: 3 | Status: SHIPPED | OUTPATIENT
Start: 2019-10-30 | End: 2020-11-17

## 2019-10-30 RX ORDER — LISINOPRIL AND HYDROCHLOROTHIAZIDE 12.5; 1 MG/1; MG/1
TABLET ORAL
Qty: 90 TAB | Refills: 3 | Status: SHIPPED | OUTPATIENT
Start: 2019-10-30 | End: 2021-01-19

## 2019-10-30 NOTE — ASSESSMENT & PLAN NOTE
Started rosuvastatin 10 mg as she had significant pain with simvastatin.   She has no pain with the crestor.   Repeat labs ordered.   Continue with gemfibrozil.   Labs in June 2019  Results for KIA CAMARA (MRN [id number]) as of 10/30/2019 08:39   Ref. Range 6/22/2019 10:37   Cholesterol,Tot Latest Ref Range: 100 - 199 mg/dL 284 (H)   Triglycerides Latest Ref Range: 0 - 149 mg/dL 94   HDL Latest Ref Range: >=40 mg/dL 55   LDL Latest Ref Range: <100 mg/dL 210 (H)

## 2019-10-30 NOTE — PROGRESS NOTES
Subjective:   Hanna Camara is a 69 y.o. female here today for evaluation and management of:     Hyperlipidemia  Started rosuvastatin 10 mg as she had significant pain with simvastatin.   She has no pain with the crestor.   Repeat labs ordered.   Continue with gemfibrozil.   Labs in June 2019  Results for KIA CAMARA (MRN [id number]) as of 10/30/2019 08:39   Ref. Range 6/22/2019 10:37   Cholesterol,Tot Latest Ref Range: 100 - 199 mg/dL 284 (H)   Triglycerides Latest Ref Range: 0 - 149 mg/dL 94   HDL Latest Ref Range: >=40 mg/dL 55   LDL Latest Ref Range: <100 mg/dL 210 (H)          Current medicines (including changes today)  Current Outpatient Medications   Medication Sig Dispense Refill   • celecoxib (CELEBREX) 200 MG Cap TAKE 1 CAPSULE BY MOUTH TWICE DAILY 180 Cap 3   • cyclobenzaprine (FLEXERIL) 10 MG Tab TAKE ONE TABLET BY MOUTH THREE TIMES DAILY AS NEEDED FOR SPASMS 270 Tab 3   • gabapentin (NEURONTIN) 300 MG Cap TAKE 2 CAPSULES BY MOUTH ONCE DAILY IN THE MORNING AND 3 CAPSULES IN THE EVENING 450 Cap 0   • omeprazole (PRILOSEC) 20 MG delayed-release capsule TAKE 1 CAPSULE BY MOUTH TWICE DAILY 180 Cap 3   • lisinopril-hydrochlorothiazide (PRINZIDE, ZESTORETIC) 10-12.5 MG per tablet TAKE 1 TABLET BY MOUTH ONCE DAILY 90 Tab 3   • gemfibrozil (LOPID) 600 MG Tab TAKE 1 TABLET BY MOUTH TWICE DAILY 180 Tab 3   • rosuvastatin (CRESTOR) 10 MG Tab Take 1 Tab by mouth every evening. 90 Tab 3   • simethicone (MYLICON) 80 MG Chew Tab Take 1 Tab by mouth every 6 hours as needed for Flatulence. 30 Tab 6   • acetaminophen (TYLENOL) 325 MG Tab Take 650 mg by mouth every four hours as needed.     • Artificial Tear Ointment (DRY EYES OP) Place 1 Drop in both eyes 1 time daily as needed.     • multivitamin (THERAGRAN) Tab Take 1 Tab by mouth every day.     • albuterol (VENTOLIN OR PROVENTIL) 108 (90 BASE) MCG/ACT AERS Inhale 2 Puffs by mouth every 6 hours as needed for Shortness of Breath. 8.5 g 3     No current  "facility-administered medications for this visit.      She  has a past medical history of Arthritis, Breath shortness, Bronchitis, CAD (coronary artery disease), Dental disorder (07/2018), DJD (degenerative joint disease), Fibromyalgia, GERD (gastroesophageal reflux disease), Heart burn, Heart murmur, Hepatitis B, Hiatus hernia syndrome, High cholesterol, Hyperlipidemia, Hypertension, IBD (inflammatory bowel disease), Indigestion, and Pain.    ROS  No chest pain, no shortness of breath, no abdominal pain       Objective:     /80   Pulse 78   Temp 36.7 °C (98 °F) (Temporal)   Resp 16   Ht 1.676 m (5' 6\")   Wt 88.3 kg (194 lb 9.6 oz)   SpO2 97%  Body mass index is 31.41 kg/m².   Physical Exam:  Constitutional: Alert, no distress.  Skin: Warm, dry, good turgor, no rashes in visible areas.  Eye: Equal, round and reactive, conjunctiva clear, lids normal.  ENMT: Lips without lesions, good dentition, oropharynx clear.  Neck: Trachea midline, no masses, no thyromegaly. No cervical or supraclavicular lymphadenopathy  Respiratory: Unlabored respiratory effort, lungs clear to auscultation, no wheezes, no ronchi.  Cardiovascular: Normal S1, S2, no murmur, no edema.  Abdomen: Soft, non-tender, no masses, no hepatosplenomegaly.  Psych: Alert and oriented x3, normal affect and mood.        Assessment and Plan:   The following treatment plan was discussed    1. Need for vaccination  - INFLUENZA VACCINE, HIGH DOSE (65+ ONLY)    2. Muscle spasm  - cyclobenzaprine (FLEXERIL) 10 MG Tab; TAKE ONE TABLET BY MOUTH THREE TIMES DAILY AS NEEDED FOR SPASMS  Dispense: 270 Tab; Refill: 3    3. Osteoarthritis of multiple joints, unspecified osteoarthritis type  - celecoxib (CELEBREX) 200 MG Cap; TAKE 1 CAPSULE BY MOUTH TWICE DAILY  Dispense: 180 Cap; Refill: 3  - gabapentin (NEURONTIN) 300 MG Cap; TAKE 2 CAPSULES BY MOUTH ONCE DAILY IN THE MORNING AND 3 CAPSULES IN THE EVENING  Dispense: 450 Cap; Refill: 0  - Comp Metabolic Panel; " Future  - Lipid Profile; Future    4. Mixed hyperlipidemia  - Lipid Profile; Future  - gemfibrozil (LOPID) 600 MG Tab; TAKE 1 TABLET BY MOUTH TWICE DAILY  Dispense: 180 Tab; Refill: 3    5. Gastroesophageal reflux disease without esophagitis  - omeprazole (PRILOSEC) 20 MG delayed-release capsule; TAKE 1 CAPSULE BY MOUTH TWICE DAILY  Dispense: 180 Cap; Refill: 3    6. Essential hypertension  Controlled.   - lisinopril-hydrochlorothiazide (PRINZIDE, ZESTORETIC) 10-12.5 MG per tablet; TAKE 1 TABLET BY MOUTH ONCE DAILY  Dispense: 90 Tab; Refill: 3      Followup: Return in about 6 months (around 4/30/2020) for dyslipidemia, .

## 2019-11-04 ENCOUNTER — TELEPHONE (OUTPATIENT)
Dept: MEDICAL GROUP | Facility: CLINIC | Age: 69
End: 2019-11-04

## 2019-11-04 NOTE — TELEPHONE ENCOUNTER
Patient had EKG on 10/21/19. MA was out on emergency medical leave for 2 weeks so was unable to respond to this message. Thank you.

## 2019-11-04 NOTE — TELEPHONE ENCOUNTER
----- Message from Hanna Marcelo sent at 10/18/2019  9:18 AM PDT -----  Regarding: RE: Procedure Question  Contact: [phone]  I already got a surgery release from [facility].  Now I need an EKG done to finish it up.  I need to know if you have rec'd the paperwork from Dr Cordova so I can get it done.     ----- Message -----  From: John Jaramillo Ass't  Sent: 10/17/2019  2:19 PM PDT  To: Hanna Marcelo  Subject: RE: Procedure Question  Good Afternoon Hanna  If you have Dr. Cordova's order you can definitely get a EKG. Currently Dr. Irwin is out of office until next week so if you can reach back out to her than that would be great. The reason being is I do not know if she requires an appointment for the surgery clearance. Most provider require a visit. Thank you.    ----- Message -----     From: Hanna Marcelo     Sent: 10/17/2019 12:23 PM PDT       To: Fernando Irwin M.D.  Subject: Procedure Question    Dr Cordova's office called and wanted to know if I could get an appt to get an EKG so that I can be released to make my surgery appt

## 2019-11-07 DIAGNOSIS — E87.6 HYPOKALEMIA: ICD-10-CM

## 2019-11-07 RX ORDER — GEMFIBROZIL 600 MG/1
600 TABLET, FILM COATED ORAL 2 TIMES DAILY
Qty: 180 TAB | Refills: 0 | OUTPATIENT
Start: 2019-11-07

## 2019-11-07 RX ORDER — CELECOXIB 200 MG/1
200 CAPSULE ORAL
Qty: 180 CAP | Refills: 0 | OUTPATIENT
Start: 2019-11-07

## 2019-11-07 NOTE — RESULT ENCOUNTER NOTE
Hanna,  Your labs show your kidney function is stable and cholesterol levels have improved with is great! The potassium level is slightly low so I've ordered a lab to recheck this before your next visit in April. Some potassium rich foods are oranges, banana, spinach, white beans, lentils, fish.   Mild elevation in alkaline phosphatase can be from arthritis. No other concerns on your labs!  Fenrando Irwin M.D.

## 2020-07-01 ENCOUNTER — HOSPITAL ENCOUNTER (OUTPATIENT)
Dept: RADIOLOGY | Facility: MEDICAL CENTER | Age: 70
End: 2020-07-01
Attending: FAMILY MEDICINE
Payer: MEDICARE

## 2020-07-01 DIAGNOSIS — Z12.31 VISIT FOR SCREENING MAMMOGRAM: ICD-10-CM

## 2020-07-01 PROCEDURE — 77067 SCR MAMMO BI INCL CAD: CPT

## 2020-07-13 ENCOUNTER — OFFICE VISIT (OUTPATIENT)
Dept: MEDICAL GROUP | Facility: PHYSICIAN GROUP | Age: 70
End: 2020-07-13
Payer: MEDICARE

## 2020-07-13 ENCOUNTER — APPOINTMENT (OUTPATIENT)
Dept: RADIOLOGY | Facility: IMAGING CENTER | Age: 70
End: 2020-07-13
Attending: NURSE PRACTITIONER
Payer: MEDICARE

## 2020-07-13 VITALS
TEMPERATURE: 98.7 F | BODY MASS INDEX: 31.98 KG/M2 | DIASTOLIC BLOOD PRESSURE: 90 MMHG | SYSTOLIC BLOOD PRESSURE: 146 MMHG | HEIGHT: 66 IN | RESPIRATION RATE: 18 BRPM | WEIGHT: 199 LBS | OXYGEN SATURATION: 97 % | HEART RATE: 97 BPM

## 2020-07-13 DIAGNOSIS — Z01.818 PREOP TESTING: ICD-10-CM

## 2020-07-13 DIAGNOSIS — M54.31 SCIATICA OF RIGHT SIDE: ICD-10-CM

## 2020-07-13 DIAGNOSIS — R10.30 LOWER ABDOMINAL PAIN: ICD-10-CM

## 2020-07-13 DIAGNOSIS — R10.31 RIGHT LOWER QUADRANT ABDOMINAL PAIN: ICD-10-CM

## 2020-07-13 LAB
APPEARANCE UR: CLEAR
BILIRUB UR STRIP-MCNC: NORMAL MG/DL
COLOR UR AUTO: YELLOW
GLUCOSE UR STRIP.AUTO-MCNC: NORMAL MG/DL
KETONES UR STRIP.AUTO-MCNC: NORMAL MG/DL
LEUKOCYTE ESTERASE UR QL STRIP.AUTO: NORMAL
NITRITE UR QL STRIP.AUTO: NORMAL
PH UR STRIP.AUTO: 5 [PH] (ref 5–8)
PROT UR QL STRIP: NORMAL MG/DL
RBC UR QL AUTO: NORMAL
SP GR UR STRIP.AUTO: 1.02
UROBILINOGEN UR STRIP-MCNC: 0.2 MG/DL

## 2020-07-13 PROCEDURE — 74018 RADEX ABDOMEN 1 VIEW: CPT | Mod: TC,FY | Performed by: NURSE PRACTITIONER

## 2020-07-13 PROCEDURE — 99214 OFFICE O/P EST MOD 30 MIN: CPT | Performed by: NURSE PRACTITIONER

## 2020-07-13 PROCEDURE — 81002 URINALYSIS NONAUTO W/O SCOPE: CPT | Performed by: NURSE PRACTITIONER

## 2020-07-13 RX ORDER — KETOROLAC TROMETHAMINE 30 MG/ML
60 INJECTION, SOLUTION INTRAMUSCULAR; INTRAVENOUS ONCE
Status: COMPLETED | OUTPATIENT
Start: 2020-07-13 | End: 2020-07-13

## 2020-07-13 RX ORDER — ROSUVASTATIN CALCIUM 10 MG/1
10 TABLET, COATED ORAL EVERY EVENING
Qty: 90 TAB | Refills: 3 | Status: SHIPPED | OUTPATIENT
Start: 2020-07-13 | End: 2020-07-21 | Stop reason: SDUPTHER

## 2020-07-13 RX ADMIN — KETOROLAC TROMETHAMINE 60 MG: 30 INJECTION, SOLUTION INTRAMUSCULAR; INTRAVENOUS at 14:30

## 2020-07-13 ASSESSMENT — PATIENT HEALTH QUESTIONNAIRE - PHQ9: CLINICAL INTERPRETATION OF PHQ2 SCORE: 0

## 2020-07-13 ASSESSMENT — FIBROSIS 4 INDEX: FIB4 SCORE: 0.92

## 2020-07-13 NOTE — ASSESSMENT & PLAN NOTE
This has been going on for past 2 weeks  Pain is located in the RLQ, but has had appendectomy  Does not IBS, chronic flatulence and constipation  Does note that she was lifting heavy bags of rocks a few weeks ago and worries about a hernia  She feels she may be constipated again and has been taking stool softener with minimal relief  Laying flat and still is the only thing that improves her pain, worsened with any activity  Denies associated nausea, vomiting, fever, dysuria, blood in stools or urine  In office UA negative  Will get abdominal x-ray  She does note that since she is limiting her activity and modifying posture, she has aggravating her sciatica--which is also causing severe pain  Will give toradol injection 60 mg IM in office today  She does understand that if imaging is unrevealing, will need further imagine such as CT scan

## 2020-07-13 NOTE — PROGRESS NOTES
Chief Complaint   Patient presents with   • RLQ Pain     x 2 weeks          This is a 70 y.o.female patient that presents today with the following: Lower quadrant abdominal pain, sciatica    Lower abdominal pain  This has been going on for past 2 weeks  Pain is located in the RLQ, but has had appendectomy  Does not IBS, chronic flatulence and constipation  Does note that she was lifting heavy bags of rocks a few weeks ago and worries about a hernia  She feels she may be constipated again and has been taking stool softener with minimal relief  Laying flat and still is the only thing that improves her pain, worsened with any activity  Denies associated nausea, vomiting, fever, dysuria, blood in stools or urine  In office UA negative  Will get abdominal x-ray  She does note that since she is limiting her activity and modifying posture, she has aggravating her sciatica--which is also causing severe pain  Will give toradol injection 60 mg IM in office today  She does understand that if imaging is unrevealing, will need further imagine such as CT scan    Sciatica of right side  See additional notes      No visits with results within 1 Month(s) from this visit.   Latest known visit with results is:   Hospital Outpatient Visit on 10/30/2019   Component Date Value   • Sodium 10/30/2019 145    • Potassium 10/30/2019 3.3*   • Chloride 10/30/2019 108    • Co2 10/30/2019 24    • Anion Gap 10/30/2019 13.0*   • Glucose 10/30/2019 87    • Bun 10/30/2019 16    • Creatinine 10/30/2019 1.00    • Calcium 10/30/2019 9.5    • AST(SGOT) 10/30/2019 21    • ALT(SGPT) 10/30/2019 16    • Alkaline Phosphatase 10/30/2019 104*   • Total Bilirubin 10/30/2019 0.6    • Albumin 10/30/2019 4.4    • Total Protein 10/30/2019 7.3    • Globulin 10/30/2019 2.9    • A-G Ratio 10/30/2019 1.5    • Cholesterol,Tot 10/30/2019 219*   • Triglycerides 10/30/2019 97    • HDL 10/30/2019 56    • LDL 10/30/2019 144*   • Fasting Status 10/30/2019 Fasting    • GFR If   10/30/2019 >60    • GFR If Non  Ameri* 10/30/2019 55*             Past Medical History:   Diagnosis Date   • Arthritis     osteo   • Breath shortness    • Bronchitis    • CAD (coronary artery disease)    • Dental disorder 07/2018    oral surgery-teeth removal   • DJD (degenerative joint disease)    • Fibromyalgia    • GERD (gastroesophageal reflux disease)    • Heart burn    • Heart murmur     as a child   • Hepatitis B    • Hiatus hernia syndrome    • High cholesterol    • Hyperlipidemia    • Hypertension    • IBD (inflammatory bowel disease)    • Indigestion    • Pain     generalized       Past Surgical History:   Procedure Laterality Date   • NISSEN FUNDOPLICATION LAPAROSCOPIC N/A 7/31/2018    Procedure: NISSEN FUNDOPLICATION LAPAROSCOPIC;  Surgeon: John H Ganser, M.D.;  Location: SURGERY [facility];  Service: General   • FOOT SURGERY Right 2015   • ABDOMINAL HYSTERECTOMY TOTAL     • CARPAL TUNNEL RELEASE      R wrist   • CHOLECYSTECTOMY     • TONSILLECTOMY         Family History   Problem Relation Age of Onset   • Heart Disease Mother    • Cancer Mother         colorectal   • Breast Cancer Mother    • Colon Cancer Mother    • Heart Disease Father        Patient has no known allergies.    Current Outpatient Medications Ordered in Epic   Medication Sig Dispense Refill   • rosuvastatin (CRESTOR) 10 MG Tab Take 1 Tab by mouth every evening. 90 Tab 3   • celecoxib (CELEBREX) 200 MG Cap TAKE 1 CAPSULE BY MOUTH TWICE DAILY 180 Cap 3   • cyclobenzaprine (FLEXERIL) 10 MG Tab TAKE ONE TABLET BY MOUTH THREE TIMES DAILY AS NEEDED FOR SPASMS 270 Tab 3   • gabapentin (NEURONTIN) 300 MG Cap TAKE 2 CAPSULES BY MOUTH ONCE DAILY IN THE MORNING AND 3 CAPSULES IN THE EVENING 450 Cap 0   • omeprazole (PRILOSEC) 20 MG delayed-release capsule TAKE 1 CAPSULE BY MOUTH TWICE DAILY 180 Cap 3   • lisinopril-hydrochlorothiazide (PRINZIDE, ZESTORETIC) 10-12.5 MG per tablet TAKE 1 TABLET BY MOUTH ONCE DAILY 90 Tab  "3   • gemfibrozil (LOPID) 600 MG Tab TAKE 1 TABLET BY MOUTH TWICE DAILY 180 Tab 3   • simethicone (MYLICON) 80 MG Chew Tab Take 1 Tab by mouth every 6 hours as needed for Flatulence. 30 Tab 6   • acetaminophen (TYLENOL) 325 MG Tab Take 650 mg by mouth every four hours as needed.     • Artificial Tear Ointment (DRY EYES OP) Place 1 Drop in both eyes 1 time daily as needed.     • multivitamin (THERAGRAN) Tab Take 1 Tab by mouth every day.     • albuterol (VENTOLIN OR PROVENTIL) 108 (90 BASE) MCG/ACT AERS Inhale 2 Puffs by mouth every 6 hours as needed for Shortness of Breath. 8.5 g 3     No current [facility]-ordered facility-administered medications on file.        Constitutional ROS: No unexpected change in weight, No weakness, No unexplained fevers, sweats, or chills  Pulmonary ROS: No chronic cough, sputum, or hemoptysis, No shortness of breath, No recent change in breathing  Cardiovascular ROS: No chest pain  Gastrointestinal ROS: positive per HPI  Musculoskeletal/Extremities ROS: positive per HPI  Neurologic ROS: Normal development, No seizures, No weakness    Physical exam:  /90   Pulse 97   Temp 37.1 °C (98.7 °F) (Temporal)   Resp 18   Ht 1.676 m (5' 6\")   Wt 90.3 kg (199 lb) Comment: with purse  SpO2 97%   BMI 32.12 kg/m²   General Appearance: older female, alert, mild distress, well groomed  Skin: Skin color, texture, turgor normal. No rashes or lesions.  Lungs: negative findings: normal respiratory rate and rhythm, lungs clear to auscultation  Heart: negative. RRR without murmur, gallop, or rubs.  No ectopy.  Abdomen: positive findings:  Pain with palpation of RLQ, positive for bowel sounds, abd soft  Musculoskeletal: positive findings: decreased ROM to RLE  Neurologic: intact, CN 2-12 grossly intact    Medical decision making/discussion:     CT of abdomen    toradol injection    ER precautions    meds refilled    Hanna was seen today for rlq pain.    Diagnoses and all orders for this " visit:    Lower abdominal pain  -     [id number] VIEW; Future  -     POCT Urinalysis    Sciatica of right side  -     ketorolac (TORADOL) injection 60 mg  -     POCT Urinalysis    Right lower quadrant abdominal pain  -     CT-ABDOMEN-PELVIS WITH & W/O; Future    Preop testing  -     Basic Metabolic Panel; Future    Other orders  -     rosuvastatin (CRESTOR) 10 MG Tab; Take 1 Tab by mouth every evening.        No follow-ups on file.        Please note that this dictation was created using voice recognition software. I have made every reasonable attempt to correct obvious errors, but I expect that there are errors of grammar and possibly content that I did not discover before finalizing the note.

## 2020-07-15 ENCOUNTER — HOSPITAL ENCOUNTER (OUTPATIENT)
Dept: LAB | Facility: MEDICAL CENTER | Age: 70
End: 2020-07-15
Attending: NURSE PRACTITIONER
Payer: MEDICARE

## 2020-07-15 DIAGNOSIS — Z01.818 PREOP TESTING: ICD-10-CM

## 2020-07-15 LAB
ANION GAP SERPL CALC-SCNC: 16 MMOL/L (ref 7–16)
BUN SERPL-MCNC: 15 MG/DL (ref 8–22)
CALCIUM SERPL-MCNC: 9.4 MG/DL (ref 8.5–10.5)
CHLORIDE SERPL-SCNC: 105 MMOL/L (ref 96–112)
CO2 SERPL-SCNC: 22 MMOL/L (ref 20–33)
CREAT SERPL-MCNC: 0.84 MG/DL (ref 0.5–1.4)
GLUCOSE SERPL-MCNC: 92 MG/DL (ref 65–99)
POTASSIUM SERPL-SCNC: 4 MMOL/L (ref 3.6–5.5)
SODIUM SERPL-SCNC: 143 MMOL/L (ref 135–145)

## 2020-07-15 PROCEDURE — 80048 BASIC METABOLIC PNL TOTAL CA: CPT

## 2020-07-15 PROCEDURE — 36415 COLL VENOUS BLD VENIPUNCTURE: CPT

## 2020-07-17 ENCOUNTER — HOSPITAL ENCOUNTER (OUTPATIENT)
Dept: RADIOLOGY | Facility: MEDICAL CENTER | Age: 70
End: 2020-07-17
Attending: NURSE PRACTITIONER
Payer: MEDICARE

## 2020-07-17 DIAGNOSIS — R10.31 RIGHT LOWER QUADRANT ABDOMINAL PAIN: ICD-10-CM

## 2020-07-17 PROCEDURE — 74177 CT ABD & PELVIS W/CONTRAST: CPT

## 2020-07-17 PROCEDURE — 700117 HCHG RX CONTRAST REV CODE 255: Performed by: NURSE PRACTITIONER

## 2020-07-17 RX ADMIN — IOHEXOL 100 ML: 350 INJECTION, SOLUTION INTRAVENOUS at 15:16

## 2020-07-17 RX ADMIN — IOHEXOL 25 ML: 240 INJECTION, SOLUTION INTRATHECAL; INTRAVASCULAR; INTRAVENOUS; ORAL at 13:59

## 2020-07-22 RX ORDER — ROSUVASTATIN CALCIUM 10 MG/1
10 TABLET, COATED ORAL EVERY EVENING
Qty: 90 TAB | Refills: 3 | Status: SHIPPED | OUTPATIENT
Start: 2020-07-22 | End: 2021-01-18 | Stop reason: SDUPTHER

## 2020-11-13 DIAGNOSIS — K21.9 GASTROESOPHAGEAL REFLUX DISEASE WITHOUT ESOPHAGITIS: ICD-10-CM

## 2020-11-13 DIAGNOSIS — M15.9 OSTEOARTHRITIS OF MULTIPLE JOINTS, UNSPECIFIED OSTEOARTHRITIS TYPE: ICD-10-CM

## 2020-11-17 RX ORDER — OMEPRAZOLE 20 MG/1
CAPSULE, DELAYED RELEASE ORAL
Qty: 180 CAP | Refills: 1 | Status: SHIPPED | OUTPATIENT
Start: 2020-11-17 | End: 2021-05-23 | Stop reason: SDUPTHER

## 2020-11-17 RX ORDER — CELECOXIB 200 MG/1
CAPSULE ORAL
Qty: 180 CAP | Refills: 1 | Status: SHIPPED | OUTPATIENT
Start: 2020-11-17 | End: 2021-05-17

## 2021-01-04 ENCOUNTER — NURSE TRIAGE (OUTPATIENT)
Dept: HEALTH INFORMATION MANAGEMENT | Facility: OTHER | Age: 71
End: 2021-01-04

## 2021-01-04 NOTE — TELEPHONE ENCOUNTER
Regarding: diarrhea  ----- Message from Nataly Ochoa sent at 1/4/2021 10:54 AM PST -----  Pt is calling to make an appt with pcp, pt is having diarrhea, pain, and blood in stool.

## 2021-01-04 NOTE — TELEPHONE ENCOUNTER
1. Caller Name: Flor Marcelo                 Call Back Number: [phone] (home)     [facility] PCP or Specialty Provider: Yes Dr. Irwin        2.  Has the patient previously tested positive for COVID-19? No    3.  In the last two weeks, has the patient had any new or worsening symptoms (not explained by alternative diagnosis)? No. Chronic bronchitis history    4.  Does patient have any comoribidities? None     5.  Has the patient had any known contact with someone who is suspected or confirmed to have COVID-19? No.    5. Disposition: Cleared by RN Triage as potential is low for COVID-19; OK to keep/schedule appointment    Note routed to [facility] Provider: MARILYN only.     CLEARED FOR IN OFFICE APPOINTMENT.

## 2021-01-07 ENCOUNTER — TELEMEDICINE (OUTPATIENT)
Dept: MEDICAL GROUP | Facility: PHYSICIAN GROUP | Age: 71
End: 2021-01-07
Payer: MEDICARE

## 2021-01-07 VITALS — BODY MASS INDEX: 26.68 KG/M2 | WEIGHT: 166 LBS | HEIGHT: 66 IN

## 2021-01-07 DIAGNOSIS — R06.02 SOB (SHORTNESS OF BREATH): ICD-10-CM

## 2021-01-07 DIAGNOSIS — R82.90 URINE ABNORMALITY: ICD-10-CM

## 2021-01-07 DIAGNOSIS — R19.7 DIARRHEA, UNSPECIFIED TYPE: ICD-10-CM

## 2021-01-07 DIAGNOSIS — R63.4 ABNORMAL WEIGHT LOSS: ICD-10-CM

## 2021-01-07 DIAGNOSIS — K92.1: ICD-10-CM

## 2021-01-07 DIAGNOSIS — E78.2 MIXED HYPERLIPIDEMIA: ICD-10-CM

## 2021-01-07 DIAGNOSIS — K58.9 IRRITABLE BOWEL SYNDROME, UNSPECIFIED TYPE: ICD-10-CM

## 2021-01-07 DIAGNOSIS — K64.9 HEMORRHOIDS, UNSPECIFIED HEMORRHOID TYPE: ICD-10-CM

## 2021-01-07 DIAGNOSIS — L65.9 HAIR LOSS: ICD-10-CM

## 2021-01-07 PROCEDURE — 99214 OFFICE O/P EST MOD 30 MIN: CPT | Mod: 95,CR | Performed by: FAMILY MEDICINE

## 2021-01-07 RX ORDER — HYDROCORTISONE ACETATE 25 MG/1
25 SUPPOSITORY RECTAL EVERY 12 HOURS
Qty: 30 SUPPOSITORY | Refills: 0 | Status: SHIPPED | OUTPATIENT
Start: 2021-01-07 | End: 2021-12-22

## 2021-01-07 RX ORDER — DULOXETIN HYDROCHLORIDE 60 MG/1
60 CAPSULE, DELAYED RELEASE ORAL
COMMUNITY
Start: 2020-12-28 | End: 2022-01-06

## 2021-01-07 RX ORDER — ALBUTEROL SULFATE 90 UG/1
2 AEROSOL, METERED RESPIRATORY (INHALATION) EVERY 6 HOURS PRN
Qty: 8.5 G | Refills: 3 | Status: SHIPPED | OUTPATIENT
Start: 2021-01-07

## 2021-01-07 RX ORDER — METRONIDAZOLE 500 MG/1
500 TABLET ORAL 2 TIMES DAILY
Qty: 14 TAB | Refills: 0 | Status: SHIPPED | OUTPATIENT
Start: 2021-01-07 | End: 2021-01-14

## 2021-01-07 ASSESSMENT — PATIENT HEALTH QUESTIONNAIRE - PHQ9: CLINICAL INTERPRETATION OF PHQ2 SCORE: 0

## 2021-01-07 NOTE — PATIENT INSTRUCTIONS
"Referral for GI for colonoscopy  Continue to drink fluids until your urine color is pale yellow  Labs as soon as possible- these are fasting   Refill on Albuterol   new prescriptions    Diet for Irritable Bowel Syndrome  When you have irritable bowel syndrome (IBS), it is very important to eat the foods and follow the eating habits that are best for your condition. IBS may cause various symptoms such as pain in the abdomen, constipation, or diarrhea. Choosing the right foods can help to ease the discomfort from these symptoms. Work with your health care provider and diet and nutrition specialist (dietitian) to find the eating plan that will help to control your symptoms.  What are tips for following this plan?         · Keep a food diary. This will help you identify foods that cause symptoms. Write down:  ? What you eat and when you eat it.  ? What symptoms you have.  ? When symptoms occur in relation to your meals, such as \"pain in abdomen 2 hours after dinner.\"  · Eat your meals slowly and in a relaxed setting.  · Aim to eat 5-6 small meals per day. Do not skip meals.  · Drink enough fluid to keep your urine pale yellow.  · Ask your health care provider if you should take an over-the-counter probiotic to help restore healthy bacteria in your gut (digestive tract).  ? Probiotics are foods that contain good bacteria and yeasts.  · Your dietitian may have specific dietary recommendations for you based on your symptoms. He or she may recommend that you:  ? Avoid foods that cause symptoms. Talk with your dietitian about other ways to get the same nutrients that are in those problem foods.  ? Avoid foods with gluten. Gluten is a protein that is found in rye, wheat, and barley.  ? Eat more foods that contain soluble fiber. Examples of foods with high soluble fiber include oats, seeds, and certain fruits and vegetables. Take a fiber supplement if directed by your dietitian.  ? Reduce or avoid certain foods called " FODMAPs. These are foods that contain carbohydrates that are hard to digest. Ask your doctor which foods contain these carbohydrates.  What foods are not recommended?  The following are some foods and drinks that may make your symptoms worse:  · Fatty foods, such as french fries.  · Foods that contain gluten, such as pasta and cereal.  · Dairy products, such as milk, cheese, and ice cream.  · Chocolate.  · Alcohol.  · Products with caffeine, such as coffee.  · Carbonated drinks, such as soda.  · Foods that are high in FODMAPs. These include certain fruits and vegetables.  · Products with sweeteners such as honey, high fructose corn syrup, sorbitol, and mannitol.  The items listed above may not be a complete list of foods and beverages you should avoid. Contact a dietitian for more information.  What foods are good sources of fiber?  Your health care provider or dietitian may recommend that you eat more foods that contain fiber. Fiber can help to reduce constipation and other IBS symptoms. Add foods with fiber to your diet a little at a time so your body can get used to them. Too much fiber at one time might cause gas and swelling of your abdomen. The following are some foods that are good sources of fiber:  · Berries, such as raspberries, strawberries, and blueberries.  · Tomatoes.  · Carrots.  · Brown rice.  · Oats.  · Seeds, such as sofia and pumpkin seeds.  The items listed above may not be a complete list of recommended sources of fiber. Contact your dietitian for more options.  Where to find more information  · International Foundation for Functional Gastrointestinal Disorders: www.iffgd.org  · National [place] of Diabetes and Digestive and Kidney Diseases: www.niddk.nih.gov  Summary  · When you have irritable bowel syndrome (IBS), it is very important to eat the foods and follow the eating habits that are best for your condition.  · IBS may cause various symptoms such as pain in the abdomen, constipation, or  diarrhea.  · Choosing the right foods can help to ease the discomfort that comes from symptoms.  · Keep a food diary. This will help you identify foods that cause symptoms.  · Your health care provider or diet and nutrition specialist (dietitian) may recommend that you eat more foods that contain fiber.  This information is not intended to replace advice given to you by your health care provider. Make sure you discuss any questions you have with your health care provider.  Document Released: 03/09/2005 Document Revised: 04/08/2020 Document Reviewed: 08/21/2018  Elsevier Patient Education © 2020 [employer]

## 2021-01-07 NOTE — PROGRESS NOTES
Virtual Visit: Established Patient   This visit was conducted via Zoom using secure and encrypted videoconferencing technology. The patient was in a private location in the state of [place].    The patient's identity was confirmed and verbal consent was obtained for this virtual visit.    Subjective:   CC:   Chief Complaint   Patient presents with   • Irritable Bowel Syndrome   • Medication Refill     ALBUTEROL        Hanna Marcelo is a 70 y.o. female presenting for evaluation and management of:    IBS (irritable bowel syndrome)  Patient with diarrhea, watery, chronic worsening  Also with flare up of hemorrhoids  Has chronic IBS, but feels these symptoms are much worse than usual.   Also with abdominal pain.   rx for flagyl provided.   Stool studies and ref to GI done.       ROS   Denies any recent fevers or chills. No nausea or vomiting. No chest pains or shortness of breath.     No Known Allergies    Current medicines (including changes today)  Current Outpatient Medications   Medication Sig Dispense Refill   • B Complex-Biotin-FA (B-COMPLEX PO) Take  by mouth.     • Multiple Minerals-Vitamins (CALCIUM & VIT D3 BONE HEALTH PO) Take  by mouth.     • DULoxetine (CYMBALTA) 60 MG Cap DR Particles delayed-release capsule Take 60 mg by mouth.     • celecoxib (CELEBREX) 200 MG Cap Take 1 capsule by mouth twice daily 180 Cap 1   • omeprazole (PRILOSEC) 20 MG delayed-release capsule Take 1 capsule by mouth twice daily 180 Cap 1   • rosuvastatin (CRESTOR) 10 MG Tab Take 1 Tab by mouth every evening. 90 Tab 3   • cyclobenzaprine (FLEXERIL) 10 MG Tab TAKE ONE TABLET BY MOUTH THREE TIMES DAILY AS NEEDED FOR SPASMS 270 Tab 3   • lisinopril-hydrochlorothiazide (PRINZIDE, ZESTORETIC) 10-12.5 MG per tablet TAKE 1 TABLET BY MOUTH ONCE DAILY 90 Tab 3   • Artificial Tear Ointment (DRY EYES OP) Place 1 Drop in both eyes 1 time daily as needed.     • multivitamin (THERAGRAN) Tab Take 1 Tab by mouth every day.     • albuterol  (VENTOLIN OR PROVENTIL) 108 (90 BASE) MCG/ACT AERS Inhale 2 Puffs by mouth every 6 hours as needed for Shortness of Breath. 8.5 g 3   • simethicone (MYLICON) 80 MG Chew Tab Take 1 Tab by mouth every 6 hours as needed for Flatulence. 30 Tab 6     No current facility-administered medications for this visit.        Patient Active Problem List    Diagnosis Date Noted   • Lower abdominal pain 07/13/2020   • Sciatica of right side 07/13/2020   • Chronic right shoulder pain 10/02/2019   • Hair changes 10/02/2019   • Pre-op examination 10/01/2019   • Flatulence 11/19/2018   • Hoarseness of voice 11/19/2018   • Skin infection 05/17/2018   • Chronic fatigue 02/12/2018   • Radiating back pain 01/27/2017   • Decreased GFR 01/27/2017   • FH: colon cancer 01/27/2017   • Obesity (BMI 30-39.9) 01/27/2017   • Trigger finger of right hand 08/25/2016   • Environmental allergies 11/03/2015   • Eye twitch 06/30/2015   • Onychomycosis 04/14/2015   • Mammogram abnormal 01/14/2015   • Lump of skin of back 09/30/2014   • Neck mass 09/30/2014   • DDD (degenerative disc disease), cervical 09/30/2014   • Right foot pain 09/30/2014   • Obesity (BMI 30.0-34.9) 09/30/2014   • Chronic neck pain 08/27/2014   • Chronic pain of both shoulders 08/27/2014   • Muscle spasm 06/28/2014   • Back pain 06/28/2014   • Hyperlipidemia 03/12/2014   • Visual changes 02/28/2014   • Abnormal CT scan, lung 02/28/2014   • Hypertension 02/26/2014   • Osteoarthritis 02/26/2014   • GERD (gastroesophageal reflux disease) 02/26/2014   • IBS (irritable bowel syndrome) 02/26/2014   • Fibromyalgia 02/26/2014       Family History   Problem Relation Age of Onset   • Heart Disease Mother    • Cancer Mother         colorectal   • Breast Cancer Mother    • Colon Cancer Mother    • Heart Disease Father        She  has a past medical history of Arthritis, Breath shortness, Bronchitis, CAD (coronary artery disease), Dental disorder (07/2018), DJD (degenerative joint disease),  "Fibromyalgia, GERD (gastroesophageal reflux disease), Heart burn, Heart murmur, Hepatitis B, Hiatus hernia syndrome, High cholesterol, Hyperlipidemia, Hypertension, IBD (inflammatory bowel disease), Indigestion, and Pain.  She  has a past surgical history that includes cholecystectomy; abdominal hysterectomy total; tonsillectomy; carpal tunnel release; foot surgery (Right, 2015); and nissen fundoplication laparoscopic (N/A, 7/31/2018).       Objective:   Ht 1.676 m (5' 6\")   Wt 75.3 kg (166 lb)   BMI 26.79 kg/m²     Physical Exam  Constitutional: Alert, no distress, well-groomed.  Skin: No rashes in visible areas.  Eye: Round. Conjunctiva clear, lids normal. No icterus.   ENMT: Lips pink without lesions, good dentition, moist mucous membranes. Phonation normal.  Neck: No masses, no thyromegaly. Moves freely without pain.  Respiratory: Unlabored respiratory effort, no cough or audible wheeze  Psych: Alert and oriented x3, normal affect and mood.       Assessment and Plan:   The following treatment plan was discussed:     There are no diagnoses linked to this encounter.      Follow-up: No follow-ups on file.         "

## 2021-01-08 ENCOUNTER — HOSPITAL ENCOUNTER (OUTPATIENT)
Dept: LAB | Facility: MEDICAL CENTER | Age: 71
End: 2021-01-08
Attending: FAMILY MEDICINE
Payer: MEDICARE

## 2021-01-08 DIAGNOSIS — K92.1: ICD-10-CM

## 2021-01-08 DIAGNOSIS — R82.90 URINE ABNORMALITY: ICD-10-CM

## 2021-01-08 DIAGNOSIS — R63.4 ABNORMAL WEIGHT LOSS: ICD-10-CM

## 2021-01-08 DIAGNOSIS — L65.9 HAIR LOSS: ICD-10-CM

## 2021-01-08 DIAGNOSIS — E78.2 MIXED HYPERLIPIDEMIA: ICD-10-CM

## 2021-01-08 LAB
BASOPHILS # BLD AUTO: 0.7 % (ref 0–1.8)
BASOPHILS # BLD: 0.06 K/UL (ref 0–0.12)
EOSINOPHIL # BLD AUTO: 0.23 K/UL (ref 0–0.51)
EOSINOPHIL NFR BLD: 2.8 % (ref 0–6.9)
ERYTHROCYTE [DISTWIDTH] IN BLOOD BY AUTOMATED COUNT: 44.9 FL (ref 35.9–50)
HCT VFR BLD AUTO: 45.2 % (ref 37–47)
HGB BLD-MCNC: 14.6 G/DL (ref 12–16)
IMM GRANULOCYTES # BLD AUTO: 0.02 K/UL (ref 0–0.11)
IMM GRANULOCYTES NFR BLD AUTO: 0.2 % (ref 0–0.9)
LYMPHOCYTES # BLD AUTO: 2.67 K/UL (ref 1–4.8)
LYMPHOCYTES NFR BLD: 32.6 % (ref 22–41)
MCH RBC QN AUTO: 29.3 PG (ref 27–33)
MCHC RBC AUTO-ENTMCNC: 32.3 G/DL (ref 33.6–35)
MCV RBC AUTO: 90.6 FL (ref 81.4–97.8)
MONOCYTES # BLD AUTO: 0.64 K/UL (ref 0–0.85)
MONOCYTES NFR BLD AUTO: 7.8 % (ref 0–13.4)
NEUTROPHILS # BLD AUTO: 4.56 K/UL (ref 2–7.15)
NEUTROPHILS NFR BLD: 55.9 % (ref 44–72)
NRBC # BLD AUTO: 0 K/UL
NRBC BLD-RTO: 0 /100 WBC
PLATELET # BLD AUTO: 292 K/UL (ref 164–446)
PMV BLD AUTO: 9.8 FL (ref 9–12.9)
RBC # BLD AUTO: 4.99 M/UL (ref 4.2–5.4)
WBC # BLD AUTO: 8.2 K/UL (ref 4.8–10.8)

## 2021-01-08 PROCEDURE — 80053 COMPREHEN METABOLIC PANEL: CPT

## 2021-01-08 PROCEDURE — 36415 COLL VENOUS BLD VENIPUNCTURE: CPT

## 2021-01-08 PROCEDURE — 85025 COMPLETE CBC W/AUTO DIFF WBC: CPT

## 2021-01-08 PROCEDURE — 80061 LIPID PANEL: CPT

## 2021-01-08 PROCEDURE — 81001 URINALYSIS AUTO W/SCOPE: CPT

## 2021-01-08 PROCEDURE — 84443 ASSAY THYROID STIM HORMONE: CPT

## 2021-01-09 ENCOUNTER — HOSPITAL ENCOUNTER (OUTPATIENT)
Facility: MEDICAL CENTER | Age: 71
End: 2021-01-09
Attending: FAMILY MEDICINE
Payer: MEDICARE

## 2021-01-09 DIAGNOSIS — K92.1: ICD-10-CM

## 2021-01-09 DIAGNOSIS — R19.7 DIARRHEA, UNSPECIFIED TYPE: ICD-10-CM

## 2021-01-09 LAB
ALBUMIN SERPL BCP-MCNC: 4.4 G/DL (ref 3.2–4.9)
ALBUMIN/GLOB SERPL: 1.5 G/DL
ALP SERPL-CCNC: 123 U/L (ref 30–99)
ALT SERPL-CCNC: 12 U/L (ref 2–50)
AMORPH CRY #/AREA URNS HPF: PRESENT /HPF
ANION GAP SERPL CALC-SCNC: 12 MMOL/L (ref 7–16)
AST SERPL-CCNC: 23 U/L (ref 12–45)
BACTERIA #/AREA URNS HPF: NEGATIVE /HPF
BILIRUB SERPL-MCNC: 0.6 MG/DL (ref 0.1–1.5)
BUN SERPL-MCNC: 19 MG/DL (ref 8–22)
C DIFF DNA SPEC QL NAA+PROBE: NEGATIVE
C DIFF TOX GENS STL QL NAA+PROBE: NEGATIVE
CALCIUM SERPL-MCNC: 9.7 MG/DL (ref 8.5–10.5)
CHLORIDE SERPL-SCNC: 101 MMOL/L (ref 96–112)
CHOLEST SERPL-MCNC: 167 MG/DL (ref 100–199)
CO2 SERPL-SCNC: 24 MMOL/L (ref 20–33)
CREAT SERPL-MCNC: 0.81 MG/DL (ref 0.5–1.4)
EPI CELLS #/AREA URNS HPF: NORMAL /HPF
FASTING STATUS PATIENT QL REPORTED: NORMAL
GLOBULIN SER CALC-MCNC: 2.9 G/DL (ref 1.9–3.5)
GLUCOSE SERPL-MCNC: 106 MG/DL (ref 65–99)
HDLC SERPL-MCNC: 43 MG/DL
LDLC SERPL CALC-MCNC: 93 MG/DL
POTASSIUM SERPL-SCNC: 3.6 MMOL/L (ref 3.6–5.5)
PROT SERPL-MCNC: 7.3 G/DL (ref 6–8.2)
RBC # URNS HPF: NORMAL /HPF
SODIUM SERPL-SCNC: 137 MMOL/L (ref 135–145)
TRIGL SERPL-MCNC: 153 MG/DL (ref 0–149)
TSH SERPL DL<=0.005 MIU/L-ACNC: 1.39 UIU/ML (ref 0.38–5.33)
WBC #/AREA URNS HPF: NORMAL /HPF

## 2021-01-09 PROCEDURE — 87045 FECES CULTURE AEROBIC BACT: CPT

## 2021-01-09 PROCEDURE — 87899 AGENT NOS ASSAY W/OPTIC: CPT

## 2021-01-09 PROCEDURE — 87493 C DIFF AMPLIFIED PROBE: CPT

## 2021-01-11 LAB
E COLI SXT1+2 STL IA: NORMAL
SIGNIFICANT IND 70042: NORMAL
SITE SITE: NORMAL
SOURCE SOURCE: NORMAL

## 2021-01-12 DIAGNOSIS — R82.90 ABNORMAL URINALYSIS: ICD-10-CM

## 2021-01-12 LAB
BACTERIA STL CULT: NORMAL
C JEJUNI+C COLI AG STL QL: NORMAL
E COLI SXT1+2 STL IA: NORMAL
SIGNIFICANT IND 70042: NORMAL
SITE SITE: NORMAL
SOURCE SOURCE: NORMAL

## 2021-01-12 NOTE — ASSESSMENT & PLAN NOTE
Patient with diarrhea, watery, chronic worsening  Also with flare up of hemorrhoids  Has chronic IBS, but feels these symptoms are much worse than usual.   Also with abdominal pain.   rx for flagyl provided.   Stool studies and ref to GI done.

## 2021-01-13 LAB
APPEARANCE UR: ABNORMAL
BILIRUB UR QL STRIP.AUTO: NEGATIVE
COLOR UR: YELLOW
GLUCOSE UR STRIP.AUTO-MCNC: NEGATIVE MG/DL
KETONES UR STRIP.AUTO-MCNC: NEGATIVE MG/DL
LEUKOCYTE ESTERASE UR QL STRIP.AUTO: NEGATIVE
MICRO URNS: ABNORMAL
NITRITE UR QL STRIP.AUTO: POSITIVE
PH UR STRIP.AUTO: 5.5 [PH] (ref 5–8)
PROT UR QL STRIP: NEGATIVE MG/DL
RBC UR QL AUTO: NEGATIVE
SP GR UR STRIP.AUTO: >=1.03
UROBILINOGEN UR STRIP.AUTO-MCNC: 0.2 MG/DL

## 2021-01-17 DIAGNOSIS — I10 ESSENTIAL HYPERTENSION: ICD-10-CM

## 2021-01-18 RX ORDER — ROSUVASTATIN CALCIUM 10 MG/1
10 TABLET, COATED ORAL EVERY EVENING
Qty: 100 TAB | Refills: 3 | Status: SHIPPED | OUTPATIENT
Start: 2021-01-18 | End: 2021-07-18 | Stop reason: SDUPTHER

## 2021-01-18 NOTE — TELEPHONE ENCOUNTER
Pt has had OV within the 12 month protocol and lipid panel is current. 12 month supply sent to pharmacy.   Lab Results   Component Value Date/Time    CHOLSTRLTOT 167 01/08/2021 09:11 AM    LDL 93 01/08/2021 09:11 AM    HDL 43 01/08/2021 09:11 AM    TRIGLYCERIDE 153 (H) 01/08/2021 09:11 AM       Lab Results   Component Value Date/Time    SODIUM 137 01/08/2021 09:11 AM    POTASSIUM 3.6 01/08/2021 09:11 AM    CHLORIDE 101 01/08/2021 09:11 AM    CO2 24 01/08/2021 09:11 AM    GLUCOSE 106 (H) 01/08/2021 09:11 AM    BUN 19 01/08/2021 09:11 AM    CREATININE 0.81 01/08/2021 09:11 AM     Lab Results   Component Value Date/Time    ALKPHOSPHAT 123 (H) 01/08/2021 09:11 AM    ASTSGOT 23 01/08/2021 09:11 AM    ALTSGPT 12 01/08/2021 09:11 AM    TBILIRUBIN 0.6 01/08/2021 09:11 AM

## 2021-01-19 RX ORDER — LISINOPRIL AND HYDROCHLOROTHIAZIDE 12.5; 1 MG/1; MG/1
TABLET ORAL
Qty: 90 TAB | Refills: 1 | Status: SHIPPED | OUTPATIENT
Start: 2021-01-19 | End: 2021-04-17 | Stop reason: SDUPTHER

## 2021-01-19 NOTE — TELEPHONE ENCOUNTER
Refill X 6 months, sent to pharmacy.Pt. Seen in the last 6 months per protocol.   Lab Results   Component Value Date/Time    SODIUM 137 01/08/2021 09:11 AM    POTASSIUM 3.6 01/08/2021 09:11 AM    CHLORIDE 101 01/08/2021 09:11 AM    CO2 24 01/08/2021 09:11 AM    GLUCOSE 106 (H) 01/08/2021 09:11 AM    BUN 19 01/08/2021 09:11 AM    CREATININE 0.81 01/08/2021 09:11 AM

## 2021-01-27 ENCOUNTER — TELEPHONE (OUTPATIENT)
Dept: MEDICAL GROUP | Facility: PHYSICIAN GROUP | Age: 71
End: 2021-01-27

## 2021-01-27 NOTE — TELEPHONE ENCOUNTER
Fernando Irwin M.D.  YUSE[facility] Mccoll[facility]             I'm not sure pt has read lab result message I sent to her.   Please let her know I have ordered a urine culture she can get this done at the lab.   Fernando Irwin M.D.    Previous Messages           I have called and spoke with patient to inform her of this message. Patient will come in the next couple of days

## 2021-04-14 SDOH — HEALTH STABILITY: PHYSICAL HEALTH: ON AVERAGE, HOW MANY DAYS PER WEEK DO YOU ENGAGE IN MODERATE TO STRENUOUS EXERCISE (LIKE A BRISK WALK)?: 0 DAYS

## 2021-04-14 SDOH — ECONOMIC STABILITY: HOUSING INSECURITY: IN THE LAST 12 MONTHS, HOW MANY PLACES HAVE YOU LIVED?: 1

## 2021-04-14 SDOH — ECONOMIC STABILITY: HOUSING INSECURITY
IN THE LAST 12 MONTHS, WAS THERE A TIME WHEN YOU DID NOT HAVE A STEADY PLACE TO SLEEP OR SLEPT IN A SHELTER (INCLUDING NOW)?: NO

## 2021-04-14 SDOH — HEALTH STABILITY: MENTAL HEALTH
STRESS IS WHEN SOMEONE FEELS TENSE, NERVOUS, ANXIOUS, OR CAN'T SLEEP AT NIGHT BECAUSE THEIR MIND IS TROUBLED. HOW STRESSED ARE YOU?: ONLY A LITTLE

## 2021-04-14 SDOH — HEALTH STABILITY: PHYSICAL HEALTH: ON AVERAGE, HOW MANY MINUTES DO YOU ENGAGE IN EXERCISE AT THIS LEVEL?: 0 MINUTES

## 2021-04-14 SDOH — ECONOMIC STABILITY: INCOME INSECURITY: IN THE LAST 12 MONTHS, WAS THERE A TIME WHEN YOU WERE NOT ABLE TO PAY THE MORTGAGE OR RENT ON TIME?: NO

## 2021-04-14 SDOH — ECONOMIC STABILITY: TRANSPORTATION INSECURITY
IN THE PAST 12 MONTHS, HAS LACK OF RELIABLE TRANSPORTATION KEPT YOU FROM MEDICAL APPOINTMENTS, MEETINGS, WORK OR FROM GETTING THINGS NEEDED FOR DAILY LIVING?: NO

## 2021-04-14 SDOH — ECONOMIC STABILITY: TRANSPORTATION INSECURITY
IN THE PAST 12 MONTHS, HAS THE LACK OF TRANSPORTATION KEPT YOU FROM MEDICAL APPOINTMENTS OR FROM GETTING MEDICATIONS?: NO

## 2021-04-14 ASSESSMENT — SOCIAL DETERMINANTS OF HEALTH (SDOH)
WITHIN THE PAST 12 MONTHS, YOU WORRIED THAT YOUR FOOD WOULD RUN OUT BEFORE YOU GOT THE MONEY TO BUY MORE: NEVER TRUE
HOW OFTEN DO YOU GET TOGETHER WITH FRIENDS OR RELATIVES?: ONCE A WEEK
HOW OFTEN DO YOU ATTENT MEETINGS OF THE CLUB OR ORGANIZATION YOU BELONG TO?: NEVER
HOW OFTEN DO YOU ATTEND CHURCH OR RELIGIOUS SERVICES?: MORE THAN 4 TIMES PER YEAR
HOW OFTEN DO YOU HAVE A DRINK CONTAINING ALCOHOL: NEVER
WITHIN THE PAST 12 MONTHS, THE FOOD YOU BOUGHT JUST DIDN'T LAST AND YOU DIDN'T HAVE MONEY TO GET MORE: NEVER TRUE
DO YOU BELONG TO ANY CLUBS OR ORGANIZATIONS SUCH AS CHURCH GROUPS UNIONS, FRATERNAL OR ATHLETIC GROUPS, OR SCHOOL GROUPS?: NO
HOW HARD IS IT FOR YOU TO PAY FOR THE VERY BASICS LIKE FOOD, HOUSING, MEDICAL CARE, AND HEATING?: NOT HARD AT ALL
IN A TYPICAL WEEK, HOW MANY TIMES DO YOU TALK ON THE PHONE WITH FAMILY, FRIENDS, OR NEIGHBORS?: THREE TIMES A WEEK

## 2021-04-21 ENCOUNTER — APPOINTMENT (OUTPATIENT)
Dept: MEDICAL GROUP | Facility: PHYSICIAN GROUP | Age: 71
End: 2021-04-21
Payer: MEDICARE

## 2021-04-22 ENCOUNTER — PATIENT MESSAGE (OUTPATIENT)
Dept: MEDICAL GROUP | Facility: PHYSICIAN GROUP | Age: 71
End: 2021-04-22

## 2021-05-18 DIAGNOSIS — M62.838 MUSCLE SPASM: ICD-10-CM

## 2021-05-18 DIAGNOSIS — M15.9 OSTEOARTHRITIS OF MULTIPLE JOINTS, UNSPECIFIED OSTEOARTHRITIS TYPE: ICD-10-CM

## 2021-05-19 RX ORDER — CYCLOBENZAPRINE HCL 10 MG
TABLET ORAL
Qty: 270 TABLET | Refills: 3 | Status: SHIPPED | OUTPATIENT
Start: 2021-05-19 | End: 2022-06-05 | Stop reason: SDUPTHER

## 2021-05-19 RX ORDER — CELECOXIB 200 MG/1
200 CAPSULE ORAL
Qty: 90 CAPSULE | Refills: 3 | Status: SHIPPED | OUTPATIENT
Start: 2021-05-19 | End: 2021-08-16

## 2021-07-20 RX ORDER — ROSUVASTATIN CALCIUM 10 MG/1
10 TABLET, COATED ORAL EVERY EVENING
Qty: 100 TABLET | Refills: 3 | Status: SHIPPED | OUTPATIENT
Start: 2021-07-20 | End: 2022-02-14 | Stop reason: SDUPTHER

## 2021-07-20 NOTE — TELEPHONE ENCOUNTER
Received request via: Pharmacy    Was the patient seen in the last year in this department? Yes    Does the patient have an active prescription (recently filled or refills available) for medication(s) requested? No    Last OV:01/07/21  Last Labs: 01/09/21    Current Outpatient Medications   Medication Sig Dispense Refill   • omeprazole (PRILOSEC) 20 MG delayed-release capsule Take 1 capsule by mouth 2 times a day. 180 capsule 3   • cyclobenzaprine (FLEXERIL) 10 mg Tab TAKE ONE TABLET BY MOUTH THREE TIMES DAILY AS NEEDED FOR SPASMS 270 tablet 3   • celecoxib (CELEBREX) 200 MG Cap Take 1 capsule by mouth 1 time a day as needed. 90 capsule 3   • lisinopril-hydrochlorothiazide (PRINZIDE) 10-12.5 MG per tablet Take 1 tablet by mouth every day. 90 tablet 3   • rosuvastatin (CRESTOR) 10 MG Tab Take 1 Tab by mouth every evening. 100 Tab 3   • B Complex-Biotin-FA (B-COMPLEX PO) Take  by mouth.     • Multiple Minerals-Vitamins (CALCIUM & VIT D3 BONE HEALTH PO) Take  by mouth.     • DULoxetine (CYMBALTA) 60 MG Cap DR Particles delayed-release capsule Take 60 mg by mouth.     • albuterol 108 (90 Base) MCG/ACT Aero Soln inhalation aerosol Inhale 2 Puffs every 6 hours as needed for Shortness of Breath. 8.5 g 3   • hydrocortisone (ANUSOL-HC) 25 MG Suppos Insert 1 Suppository into the rectum every 12 hours. 30 Suppository 0   • simethicone (MYLICON) 80 MG Chew Tab Take 1 Tab by mouth every 6 hours as needed for Flatulence. 30 Tab 6   • Artificial Tear Ointment (DRY EYES OP) Place 1 Drop in both eyes 1 time daily as needed.     • multivitamin (THERAGRAN) Tab Take 1 Tab by mouth every day.       No current facility-administered medications for this visit.

## 2021-08-15 DIAGNOSIS — M15.9 OSTEOARTHRITIS OF MULTIPLE JOINTS, UNSPECIFIED OSTEOARTHRITIS TYPE: ICD-10-CM

## 2021-08-16 NOTE — TELEPHONE ENCOUNTER
Received request via: Pharmacy    Was the patient seen in the last year in this department? Yes    Does the patient have an active prescription (recently filled or refills available) for medication(s) requested? No    Requested Prescriptions     Pending Prescriptions Disp Refills    celecoxib (CELEBREX) 200 MG Cap [Pharmacy Med Name: Celecoxib 200 MG Oral Capsule] 180 Capsule 0     Sig: Take 1 capsule by mouth twice daily

## 2021-08-17 RX ORDER — CELECOXIB 200 MG/1
CAPSULE ORAL
Qty: 180 CAPSULE | Refills: 0 | Status: SHIPPED | OUTPATIENT
Start: 2021-08-17 | End: 2021-08-19

## 2021-08-18 ENCOUNTER — OFFICE VISIT (OUTPATIENT)
Dept: MEDICAL GROUP | Facility: PHYSICIAN GROUP | Age: 71
End: 2021-08-18

## 2021-08-18 ENCOUNTER — APPOINTMENT (OUTPATIENT)
Dept: RADIOLOGY | Facility: IMAGING CENTER | Age: 71
End: 2021-08-18
Attending: NURSE PRACTITIONER
Payer: MEDICARE

## 2021-08-18 VITALS
WEIGHT: 168 LBS | TEMPERATURE: 98.6 F | RESPIRATION RATE: 20 BRPM | OXYGEN SATURATION: 95 % | SYSTOLIC BLOOD PRESSURE: 134 MMHG | HEIGHT: 66 IN | HEART RATE: 83 BPM | DIASTOLIC BLOOD PRESSURE: 84 MMHG | BODY MASS INDEX: 27 KG/M2

## 2021-08-18 DIAGNOSIS — G89.29 CHRONIC MIDLINE LOW BACK PAIN WITH RIGHT-SIDED SCIATICA: ICD-10-CM

## 2021-08-18 DIAGNOSIS — M54.41 CHRONIC MIDLINE LOW BACK PAIN WITH RIGHT-SIDED SCIATICA: ICD-10-CM

## 2021-08-18 DIAGNOSIS — Z12.31 ENCOUNTER FOR SCREENING MAMMOGRAM FOR BREAST CANCER: ICD-10-CM

## 2021-08-18 PROCEDURE — 73502 X-RAY EXAM HIP UNI 2-3 VIEWS: CPT | Mod: TC,FY,RT | Performed by: NURSE PRACTITIONER

## 2021-08-18 PROCEDURE — 99214 OFFICE O/P EST MOD 30 MIN: CPT | Performed by: NURSE PRACTITIONER

## 2021-08-18 RX ORDER — HYDROCODONE BITARTRATE AND ACETAMINOPHEN 7.5; 325 MG/1; MG/1
1 TABLET ORAL EVERY 8 HOURS PRN
Qty: 15 TABLET | Refills: 0 | Status: SHIPPED | OUTPATIENT
Start: 2021-08-18 | End: 2021-08-23

## 2021-08-18 ASSESSMENT — FIBROSIS 4 INDEX: FIB4 SCORE: 1.57

## 2021-08-18 NOTE — PROGRESS NOTES
Chief Complaint   Patient presents with   • Back Pain       HISTORY OF PRESENT ILLNESS: Patient is a 71 y.o. female established patient who presents today to discuss acute on chronic low back pain with sciatica    Back pain  Patient has acute on chronic low back pain with sciatica  She is having quite a bit of pain over the past several weeks in her low back radiating down into her legs, right greater than left  She does deny any red flag warning symptoms  She has seen back specialist in the past and is requesting a new referral to a different provider she takes Celebrex but is visibly in severe pain, will give her a short prescription of [place]  [place]  reviewed, takes medications appropriately with no evidence of concerning behavior and she understands the risk associated opioid use  I feel is appropriate in the setting  She does have muscle relaxers on hand and she was advised to take these as needed, she is warned not to drive a due to sedating effects  New referral has been placed, ER precautions discussed  She is also concerned for right hip pain and wonders if her pain could also be radiating from from this, x-ray in office today does show mild osteoarthritis of bilateral hips  Did discuss with her that pain is most likely coming from the low back as opposed to the hips        Patient Active Problem List    Diagnosis Date Noted   • Diarrhea 01/07/2021   • Hair loss 01/07/2021   • Abnormal weight loss 01/07/2021   • Blood in stool, anoop 01/07/2021   • Hemorrhoids 01/07/2021   • Lower abdominal pain 07/13/2020   • Sciatica of right side 07/13/2020   • Chronic right shoulder pain 10/02/2019   • Hair changes 10/02/2019   • Pre-op examination 10/01/2019   • Flatulence 11/19/2018   • Hoarseness of voice 11/19/2018   • Skin infection 05/17/2018   • Chronic fatigue 02/12/2018   • Radiating back pain 01/27/2017   • Decreased GFR 01/27/2017   • FH: colon cancer 01/27/2017   • Obesity (BMI 30-39.9) 01/27/2017   •  Trigger finger of right hand 08/25/2016   • Environmental allergies 11/03/2015   • Eye twitch 06/30/2015   • Onychomycosis 04/14/2015   • Mammogram abnormal 01/14/2015   • Lump of skin of back 09/30/2014   • Neck mass 09/30/2014   • DDD (degenerative disc disease), cervical 09/30/2014   • Right foot pain 09/30/2014   • Obesity (BMI 30.0-34.9) 09/30/2014   • Chronic neck pain 08/27/2014   • Chronic pain of both shoulders 08/27/2014   • Muscle spasm 06/28/2014   • Back pain 06/28/2014   • Hyperlipidemia 03/12/2014   • Visual changes 02/28/2014   • Abnormal CT scan, lung 02/28/2014   • Hypertension 02/26/2014   • Osteoarthritis 02/26/2014   • GERD (gastroesophageal reflux disease) 02/26/2014   • IBS (irritable bowel syndrome) 02/26/2014   • Fibromyalgia 02/26/2014       Allergies:Patient has no known allergies.    Current Outpatient Medications   Medication Sig Dispense Refill   • HYDROcodone-acetaminophen (NORCO) 7.5-325 MG per tablet Take 1 Tablet by mouth every 8 hours as needed for up to 5 days. 15 Tablet 0   • rosuvastatin (CRESTOR) 10 MG Tab Take 1 tablet by mouth every evening. 100 tablet 3   • omeprazole (PRILOSEC) 20 MG delayed-release capsule Take 1 capsule by mouth 2 times a day. 180 capsule 3   • cyclobenzaprine (FLEXERIL) 10 mg Tab TAKE ONE TABLET BY MOUTH THREE TIMES DAILY AS NEEDED FOR SPASMS 270 tablet 3   • lisinopril-hydrochlorothiazide (PRINZIDE) 10-12.5 MG per tablet Take 1 tablet by mouth every day. 90 tablet 3   • B Complex-Biotin-FA (B-COMPLEX PO) Take  by mouth.     • Multiple Minerals-Vitamins (CALCIUM & VIT D3 BONE HEALTH PO) Take  by mouth.     • DULoxetine (CYMBALTA) 60 MG Cap DR Particles delayed-release capsule Take 60 mg by mouth.     • albuterol 108 (90 Base) MCG/ACT Aero Soln inhalation aerosol Inhale 2 Puffs every 6 hours as needed for Shortness of Breath. 8.5 g 3   • hydrocortisone (ANUSOL-HC) 25 MG Suppos Insert 1 Suppository into the rectum every 12 hours. 30 Suppository 0   •  "Artificial Tear Ointment (DRY EYES OP) Place 1 Drop in both eyes 1 time daily as needed.     • multivitamin (THERAGRAN) Tab Take 1 Tab by mouth every day.     • celecoxib (CELEBREX) 200 MG Cap Take 1 capsule by mouth twice daily 180 Capsule 0     No current facility-administered medications for this visit.       Social History     Tobacco Use   • Smoking status: Former Smoker     Packs/day: 0.50     Years: 20.00     Pack years: 10.00     Types: Cigarettes     Quit date: 10/7/2007     Years since quittin.8   • Smokeless tobacco: Never Used   • Tobacco comment: Quit    Substance Use Topics   • Alcohol use: Yes     Alcohol/week: 0.0 oz     Comment: Rarely   • Drug use: No       Family Status   Relation Name Status   • Mo     • Fa       Family History   Problem Relation Age of Onset   • Heart Disease Mother    • Cancer Mother         colorectal   • Breast Cancer Mother    • Colon Cancer Mother    • Heart Disease Father        Review of Systems:   Constitutional: Negative for fever, chills, weight loss and malaise/fatigue.   Respiratory: Negative for cough, sputum production, shortness of breath and wheezing.    Cardiovascular: Negative for chest pain, palpitations, orthopnea and leg swelling.   Gastrointestinal: Negative for heartburn, nausea, vomiting and abdominal pain.   Genitourinary/Renal: Negative for dysuria, urgency and frequency.   Musculoskeletal: Positive per HPI  Skin: Negative for rash and itching.   Neurological: Negative for dizziness, tingling, tremors, sensory change, focal weakness and headaches.   Endo/Heme/Allergies: Does not bruise/bleed easily.       Exam:  /84   Pulse 83   Temp 37 °C (98.6 °F) (Temporal)   Resp 20   Ht 1.676 m (5' 6\")   Wt 76.2 kg (168 lb)   SpO2 95%   General:  Well nourished, well developed female visibly uncomfortable sitting in chair, shifting position often  Skin: warm, dry, intact, no evidence of rash or concerning lesions  Head: is " grossly normal.  HEENT: eyes clear, conjunctiva normal, PERRLA  Pulmonary: Normal rate, rhythm and effort  Musculoskeletal: no clubbing, cyanosis, or edema.  Antalgic gait  Psych/mental: no depression, anxiety, hallucinations  Neuro: alert, intact, CN 2-12 grossly intact    Medical decision-making and discussion:    As mentioned above, new referral to spine specialist placed.  We will give her a short course of Norco 7.5-325 mg, #15 pills.  Discussed the importance of supportive measures, ER precautions.  She is due for mammogram, as has been ordered        Assessment/Plan:  Hanna was seen today for back pain.    Diagnoses and all orders for this visit:    Chronic midline low back pain with right-sided sciatica  -     REFERRAL TO SPINE SURGERY  -     DX-HIP-COMPLETE - UNILATERAL 2+ RIGHT; Future  -     HYDROcodone-acetaminophen (NORCO) 7.5-325 MG per tablet; Take 1 Tablet by mouth every 8 hours as needed for up to 5 days.  -     Consent for Opiate Prescription    Encounter for screening mammogram for breast cancer  -     MA-SCREENING MAMMO BILAT W/CAD; Future    Other orders  -     Obtain Results: Other (see comment); Obtain Results From: [facility]         Return if symptoms worsen or fail to improve, for Follow-up.    Please note that this dictation was created using voice recognition software. I have made every reasonable attempt to correct obvious errors, but I expect that there are errors of grammar and possibly content that I did not discover before finalizing the note.

## 2021-08-20 DIAGNOSIS — M15.9 OSTEOARTHRITIS OF MULTIPLE JOINTS, UNSPECIFIED OSTEOARTHRITIS TYPE: ICD-10-CM

## 2021-08-20 PROBLEM — L08.9 SKIN INFECTION: Status: RESOLVED | Noted: 2018-05-17 | Resolved: 2021-08-20

## 2021-08-20 RX ORDER — CELECOXIB 200 MG/1
CAPSULE ORAL
Qty: 180 CAPSULE | Refills: 0 | Status: SHIPPED | OUTPATIENT
Start: 2021-08-20 | End: 2022-01-06

## 2021-08-20 NOTE — ASSESSMENT & PLAN NOTE
Patient has acute on chronic low back pain with sciatica  She is having quite a bit of pain over the past several weeks in her low back radiating down into her legs, right greater than left  She does deny any red flag warning symptoms  She has seen back specialist in the past and is requesting a new referral to a different provider she takes Celebrex but is visibly in severe pain, will give her a short prescription of [place]  [place]  reviewed, takes medications appropriately with no evidence of concerning behavior and she understands the risk associated opioid use  I feel is appropriate in the setting  She does have muscle relaxers on hand and she was advised to take these as needed, she is warned not to drive a due to sedating effects  New referral has been placed, ER precautions discussed  She is also concerned for right hip pain and wonders if her pain could also be radiating from from this, x-ray in office today does show mild osteoarthritis of bilateral hips  Did discuss with her that pain is most likely coming from the low back as opposed to the hips

## 2021-09-09 RX ORDER — MELOXICAM 7.5 MG/1
7.5 TABLET ORAL DAILY
Qty: 30 TABLET | Refills: 3 | Status: SHIPPED | OUTPATIENT
Start: 2021-09-09 | End: 2022-02-01

## 2021-12-22 ENCOUNTER — TELEPHONE (OUTPATIENT)
Dept: MEDICAL GROUP | Facility: PHYSICIAN GROUP | Age: 71
End: 2021-12-22

## 2021-12-22 DIAGNOSIS — E78.2 MIXED HYPERLIPIDEMIA: ICD-10-CM

## 2021-12-22 DIAGNOSIS — Z01.818 PRE-OP EXAMINATION: ICD-10-CM

## 2021-12-22 DIAGNOSIS — I10 ESSENTIAL HYPERTENSION: ICD-10-CM

## 2021-12-22 NOTE — TELEPHONE ENCOUNTER
Received request for surgical clearance from [facility] [place]     Has been scanned in to media unsigned.

## 2022-01-03 ENCOUNTER — HOSPITAL ENCOUNTER (OUTPATIENT)
Dept: LAB | Facility: MEDICAL CENTER | Age: 72
End: 2022-01-03
Attending: FAMILY MEDICINE
Payer: MEDICARE

## 2022-01-03 DIAGNOSIS — Z01.818 PRE-OP EXAMINATION: ICD-10-CM

## 2022-01-03 DIAGNOSIS — E78.2 MIXED HYPERLIPIDEMIA: ICD-10-CM

## 2022-01-03 LAB
ALBUMIN SERPL BCP-MCNC: 4.3 G/DL (ref 3.2–4.9)
ALBUMIN/GLOB SERPL: 1.5 G/DL
ALP SERPL-CCNC: 92 U/L (ref 30–99)
ALT SERPL-CCNC: 24 U/L (ref 2–50)
ANION GAP SERPL CALC-SCNC: 15 MMOL/L (ref 7–16)
APPEARANCE UR: CLEAR
AST SERPL-CCNC: 25 U/L (ref 12–45)
BACTERIA #/AREA URNS HPF: NEGATIVE /HPF
BASOPHILS # BLD AUTO: 0.6 % (ref 0–1.8)
BASOPHILS # BLD: 0.04 K/UL (ref 0–0.12)
BILIRUB SERPL-MCNC: 0.9 MG/DL (ref 0.1–1.5)
BILIRUB UR QL STRIP.AUTO: NEGATIVE
BUN SERPL-MCNC: 17 MG/DL (ref 8–22)
CALCIUM SERPL-MCNC: 9.6 MG/DL (ref 8.5–10.5)
CHLORIDE SERPL-SCNC: 106 MMOL/L (ref 96–112)
CHOLEST SERPL-MCNC: 215 MG/DL (ref 100–199)
CO2 SERPL-SCNC: 20 MMOL/L (ref 20–33)
COLOR UR: YELLOW
CREAT SERPL-MCNC: 0.82 MG/DL (ref 0.5–1.4)
EOSINOPHIL # BLD AUTO: 0.24 K/UL (ref 0–0.51)
EOSINOPHIL NFR BLD: 3.5 % (ref 0–6.9)
EPI CELLS #/AREA URNS HPF: NEGATIVE /HPF
ERYTHROCYTE [DISTWIDTH] IN BLOOD BY AUTOMATED COUNT: 46.5 FL (ref 35.9–50)
FASTING STATUS PATIENT QL REPORTED: NORMAL
GLOBULIN SER CALC-MCNC: 2.8 G/DL (ref 1.9–3.5)
GLUCOSE SERPL-MCNC: 99 MG/DL (ref 65–99)
GLUCOSE UR STRIP.AUTO-MCNC: NEGATIVE MG/DL
HCT VFR BLD AUTO: 41.5 % (ref 37–47)
HDLC SERPL-MCNC: 50 MG/DL
HGB BLD-MCNC: 13.5 G/DL (ref 12–16)
HYALINE CASTS #/AREA URNS LPF: NORMAL /LPF
IMM GRANULOCYTES # BLD AUTO: 0.01 K/UL (ref 0–0.11)
IMM GRANULOCYTES NFR BLD AUTO: 0.1 % (ref 0–0.9)
KETONES UR STRIP.AUTO-MCNC: NEGATIVE MG/DL
LDLC SERPL CALC-MCNC: 120 MG/DL
LEUKOCYTE ESTERASE UR QL STRIP.AUTO: ABNORMAL
LYMPHOCYTES # BLD AUTO: 2.37 K/UL (ref 1–4.8)
LYMPHOCYTES NFR BLD: 34.7 % (ref 22–41)
MCH RBC QN AUTO: 30.3 PG (ref 27–33)
MCHC RBC AUTO-ENTMCNC: 32.5 G/DL (ref 33.6–35)
MCV RBC AUTO: 93 FL (ref 81.4–97.8)
MICRO URNS: ABNORMAL
MONOCYTES # BLD AUTO: 0.63 K/UL (ref 0–0.85)
MONOCYTES NFR BLD AUTO: 9.2 % (ref 0–13.4)
NEUTROPHILS # BLD AUTO: 3.54 K/UL (ref 2–7.15)
NEUTROPHILS NFR BLD: 51.9 % (ref 44–72)
NITRITE UR QL STRIP.AUTO: NEGATIVE
NRBC # BLD AUTO: 0 K/UL
NRBC BLD-RTO: 0 /100 WBC
PH UR STRIP.AUTO: 5 [PH] (ref 5–8)
PLATELET # BLD AUTO: 262 K/UL (ref 164–446)
PMV BLD AUTO: 10 FL (ref 9–12.9)
POTASSIUM SERPL-SCNC: 3.8 MMOL/L (ref 3.6–5.5)
PROT SERPL-MCNC: 7.1 G/DL (ref 6–8.2)
PROT UR QL STRIP: NEGATIVE MG/DL
RBC # BLD AUTO: 4.46 M/UL (ref 4.2–5.4)
RBC # URNS HPF: NORMAL /HPF
RBC UR QL AUTO: ABNORMAL
SODIUM SERPL-SCNC: 141 MMOL/L (ref 135–145)
SP GR UR STRIP.AUTO: 1.02
TRIGL SERPL-MCNC: 226 MG/DL (ref 0–149)
UROBILINOGEN UR STRIP.AUTO-MCNC: 0.2 MG/DL
WBC # BLD AUTO: 6.8 K/UL (ref 4.8–10.8)
WBC #/AREA URNS HPF: NORMAL /HPF

## 2022-01-03 PROCEDURE — 80053 COMPREHEN METABOLIC PANEL: CPT

## 2022-01-03 PROCEDURE — 80061 LIPID PANEL: CPT

## 2022-01-03 PROCEDURE — 81001 URINALYSIS AUTO W/SCOPE: CPT

## 2022-01-03 PROCEDURE — 36415 COLL VENOUS BLD VENIPUNCTURE: CPT

## 2022-01-03 PROCEDURE — 85025 COMPLETE CBC W/AUTO DIFF WBC: CPT

## 2022-01-06 ENCOUNTER — OFFICE VISIT (OUTPATIENT)
Dept: MEDICAL GROUP | Facility: PHYSICIAN GROUP | Age: 72
End: 2022-01-06
Payer: MEDICARE

## 2022-01-06 VITALS
TEMPERATURE: 97.8 F | DIASTOLIC BLOOD PRESSURE: 80 MMHG | WEIGHT: 196 LBS | OXYGEN SATURATION: 98 % | HEIGHT: 66 IN | BODY MASS INDEX: 31.5 KG/M2 | HEART RATE: 81 BPM | SYSTOLIC BLOOD PRESSURE: 120 MMHG | RESPIRATION RATE: 14 BRPM

## 2022-01-06 DIAGNOSIS — E66.9 OBESITY (BMI 30-39.9): ICD-10-CM

## 2022-01-06 DIAGNOSIS — Z12.31 ENCOUNTER FOR SCREENING MAMMOGRAM FOR BREAST CANCER: ICD-10-CM

## 2022-01-06 DIAGNOSIS — E78.2 MIXED HYPERLIPIDEMIA: ICD-10-CM

## 2022-01-06 DIAGNOSIS — Z23 NEED FOR VACCINATION: ICD-10-CM

## 2022-01-06 DIAGNOSIS — Z01.818 PREOPERATIVE CLEARANCE: ICD-10-CM

## 2022-01-06 DIAGNOSIS — H92.13 OTORRHEA OF BOTH EARS: ICD-10-CM

## 2022-01-06 PROCEDURE — 90662 IIV NO PRSV INCREASED AG IM: CPT | Performed by: FAMILY MEDICINE

## 2022-01-06 PROCEDURE — 99214 OFFICE O/P EST MOD 30 MIN: CPT | Mod: 25 | Performed by: FAMILY MEDICINE

## 2022-01-06 PROCEDURE — G0008 ADMIN INFLUENZA VIRUS VAC: HCPCS | Performed by: FAMILY MEDICINE

## 2022-01-06 RX ORDER — HYDROCODONE BITARTRATE AND ACETAMINOPHEN 7.5; 325 MG/1; MG/1
TABLET ORAL
COMMUNITY
Start: 2021-12-10 | End: 2023-06-07 | Stop reason: SDUPTHER

## 2022-01-06 RX ORDER — PREGABALIN 50 MG/1
CAPSULE ORAL
COMMUNITY
Start: 2021-12-07 | End: 2023-02-28

## 2022-01-06 RX ORDER — DEXAMETHASONE SODIUM PHOSPHATE 10 MG/ML
INJECTION INTRAMUSCULAR; INTRAVENOUS
COMMUNITY
Start: 2021-11-16 | End: 2022-01-06

## 2022-01-06 ASSESSMENT — FIBROSIS 4 INDEX: FIB4 SCORE: 1.34

## 2022-01-06 ASSESSMENT — PATIENT HEALTH QUESTIONNAIRE - PHQ9: CLINICAL INTERPRETATION OF PHQ2 SCORE: 0

## 2022-01-06 NOTE — ASSESSMENT & PLAN NOTE
Occasionally she will have congestion and when she lays down will have drainage like water from ears and runny nose. Only happens in the morning when she wakes up. Blows her nose a few times and is fine the rest of day.   Currently does not want to see ENT.

## 2022-01-06 NOTE — ASSESSMENT & PLAN NOTE
Patient presents for surgery clearance for b/l lumbar 3 sacral 1 laminectomies Rgiht lumbar 1/2 microdiscectomy.   3+  Going to be done by Dr. Hough at [facility] [place], she has been seeing Hamlet Aleman Pa-C at [facility] [place]  Normal cbc, cmp reviewed.  She has no cardiac conditions, she has chronic bronchitis mild.   She has ekg and labs scheduled by her surgeon on the 10th.   Surgery clearance provided today. Will fax this to [facility].

## 2022-01-06 NOTE — PROGRESS NOTES
Subjective:   Hanna Marcelo is a 71 y.o. female here today for evaluation and management of:     Preoperative clearance  Patient presents for surgery clearance for b/l lumbar 3 sacral 1 laminectomies Rgiht lumbar 1/2 microdiscectomy.   3+  Going to be done by Dr. Hough at [facility] [place], she has been seeing Hamlet Aleman Pa-C at [facility] [place]  Normal cbc, cmp reviewed.  She has no cardiac conditions, she has chronic bronchitis mild.   She has ekg and labs scheduled by her surgeon on the 10th.   Surgery clearance provided today. Will fax this to [facility].         Otorrhea of both ears  Occasionally she will have congestion and when she lays down will have drainage like water from ears and runny nose. Only happens in the morning when she wakes up. Blows her nose a few times and is fine the rest of day.   Currently does not want to see ENT.            Current medicines (including changes today)  Current Outpatient Medications   Medication Sig Dispense Refill   • HYDROcodone-acetaminophen (NORCO) 7.5-325 MG per tablet TAKE 1 TABLET BY MOUTH EVERY 8 HOURS AS NEEDED FOR SEVERE PAIN . DO NOT DRIVE OR USE ALCOHOL AFTER TAKING     • pregabalin (LYRICA) 50 MG capsule TAKE 1 CAPSULE BY MOUTH TWICE DAILY FOR NERVE PAIN     • meloxicam (MOBIC) 7.5 MG Tab Take 1 Tablet by mouth every day. 30 Tablet 3   • rosuvastatin (CRESTOR) 10 MG Tab Take 1 tablet by mouth every evening. 100 tablet 3   • omeprazole (PRILOSEC) 20 MG delayed-release capsule Take 1 capsule by mouth 2 times a day. 180 capsule 3   • cyclobenzaprine (FLEXERIL) 10 mg Tab TAKE ONE TABLET BY MOUTH THREE TIMES DAILY AS NEEDED FOR SPASMS 270 tablet 3   • lisinopril-hydrochlorothiazide (PRINZIDE) 10-12.5 MG per tablet Take 1 tablet by mouth every day. 90 tablet 3   • albuterol 108 (90 Base) MCG/ACT Aero Soln inhalation aerosol Inhale 2 Puffs every 6 hours as needed for Shortness of Breath. 8.5 g 3   • Artificial Tear Ointment (DRY EYES OP) Place 1 Drop in both eyes  "1 time daily as needed.     • multivitamin (THERAGRAN) Tab Take 1 Tab by mouth every day.       No current facility-administered medications for this visit.     She  has a past medical history of Arthritis, Breath shortness, Bronchitis, CAD (coronary artery disease), Dental disorder (07/2018), DJD (degenerative joint disease), Fibromyalgia, GERD (gastroesophageal reflux disease), Heart burn, Heart murmur, Hepatitis B, Hiatus hernia syndrome, High cholesterol, Hyperlipidemia, Hypertension, IBD (inflammatory bowel disease), Indigestion, and Pain.    ROS  No chest pain, no shortness of breath, no abdominal pain       Objective:     /80   Pulse 81   Temp 36.6 °C (97.8 °F) (Temporal)   Resp 14   Ht 1.676 m (5' 6\")   Wt 88.9 kg (196 lb)   SpO2 98%  Body mass index is 31.64 kg/m².   Physical Exam:  Constitutional: Alert, no distress.  Skin: Warm, dry, good turgor, no rashes in visible areas.  Eye: Equal, round and reactive, conjunctiva clear, lids normal.  ENMT: Lips without lesions, good dentition, oropharynx clear.  Neck: Trachea midline, no masses, no thyromegaly. No cervical or supraclavicular lymphadenopathy  Respiratory: Unlabored respiratory effort, lungs clear to auscultation, no wheezes, no ronchi.  Cardiovascular: Normal S1, S2, no murmur, no edema.  Abdomen: Soft, non-tender, no masses, no hepatosplenomegaly.  Psych: Alert and oriented x3, normal affect and mood.        Assessment and Plan:   The following treatment plan was discussed    1. Need for vaccination    - Influenza Vaccine, High Dose (65+ Only)    2. Preoperative clearance      3. Otorrhea of both ears        Followup: No follow-ups on file.         "

## 2022-01-26 DIAGNOSIS — M15.9 OSTEOARTHRITIS OF MULTIPLE JOINTS, UNSPECIFIED OSTEOARTHRITIS TYPE: ICD-10-CM

## 2022-02-01 RX ORDER — MELOXICAM 7.5 MG/1
TABLET ORAL
Qty: 30 TABLET | Refills: 3 | Status: SHIPPED | OUTPATIENT
Start: 2022-02-01 | End: 2022-04-28 | Stop reason: SDUPTHER

## 2022-02-15 RX ORDER — ROSUVASTATIN CALCIUM 10 MG/1
10 TABLET, COATED ORAL EVERY EVENING
Qty: 100 TABLET | Refills: 3 | Status: SHIPPED | OUTPATIENT
Start: 2022-02-15 | End: 2023-03-14

## 2022-04-28 DIAGNOSIS — I10 ESSENTIAL HYPERTENSION: ICD-10-CM

## 2022-04-28 DIAGNOSIS — M15.9 OSTEOARTHRITIS OF MULTIPLE JOINTS, UNSPECIFIED OSTEOARTHRITIS TYPE: ICD-10-CM

## 2022-04-28 RX ORDER — MELOXICAM 7.5 MG/1
7.5 TABLET ORAL DAILY
Qty: 90 TABLET | Refills: 2 | Status: SHIPPED | OUTPATIENT
Start: 2022-04-28 | End: 2023-02-06 | Stop reason: SDUPTHER

## 2022-04-28 RX ORDER — LISINOPRIL AND HYDROCHLOROTHIAZIDE 12.5; 1 MG/1; MG/1
1 TABLET ORAL DAILY
Qty: 90 TABLET | Refills: 3 | Status: SHIPPED | OUTPATIENT
Start: 2022-04-28 | End: 2023-05-07 | Stop reason: SDUPTHER

## 2022-04-28 NOTE — TELEPHONE ENCOUNTER
Received request via: Patient    Was the patient seen in the last year in this department? Yes    Does the patient have an active prescription (recently filled or refills available) for medication(s) requested? No   Last seen 1/6/2022  Next appt 7/6/2022

## 2022-06-01 DIAGNOSIS — K21.9 GASTROESOPHAGEAL REFLUX DISEASE WITHOUT ESOPHAGITIS: ICD-10-CM

## 2022-06-02 RX ORDER — OMEPRAZOLE 20 MG/1
20 CAPSULE, DELAYED RELEASE ORAL 2 TIMES DAILY
Qty: 180 CAPSULE | Refills: 3 | Status: SHIPPED | OUTPATIENT
Start: 2022-06-02 | End: 2023-06-13

## 2022-06-02 NOTE — TELEPHONE ENCOUNTER
Received request via: Patient    Was the patient seen in the last year in this department? Yes    Does the patient have an active prescription (recently filled or refills available) for medication(s) requested? No     Last Office Visit:01/06/2022  Last Labs:01/10/2022

## 2022-06-05 DIAGNOSIS — M62.838 MUSCLE SPASM: ICD-10-CM

## 2022-06-07 RX ORDER — CYCLOBENZAPRINE HCL 10 MG
TABLET ORAL
Qty: 270 TABLET | Refills: 3 | Status: SHIPPED | OUTPATIENT
Start: 2022-06-07

## 2022-08-30 ENCOUNTER — HOSPITAL ENCOUNTER (OUTPATIENT)
Dept: LAB | Facility: MEDICAL CENTER | Age: 72
End: 2022-08-30
Attending: FAMILY MEDICINE
Payer: MEDICARE

## 2022-08-30 ENCOUNTER — OFFICE VISIT (OUTPATIENT)
Dept: MEDICAL GROUP | Facility: PHYSICIAN GROUP | Age: 72
End: 2022-08-30
Payer: MEDICARE

## 2022-08-30 VITALS
WEIGHT: 188 LBS | DIASTOLIC BLOOD PRESSURE: 70 MMHG | HEIGHT: 66 IN | TEMPERATURE: 98 F | BODY MASS INDEX: 30.22 KG/M2 | SYSTOLIC BLOOD PRESSURE: 118 MMHG | OXYGEN SATURATION: 94 % | HEART RATE: 82 BPM | RESPIRATION RATE: 16 BRPM

## 2022-08-30 DIAGNOSIS — E66.9 OBESITY (BMI 30.0-34.9): ICD-10-CM

## 2022-08-30 DIAGNOSIS — Z12.31 ENCOUNTER FOR SCREENING MAMMOGRAM FOR MALIGNANT NEOPLASM OF BREAST: ICD-10-CM

## 2022-08-30 DIAGNOSIS — E78.2 MIXED HYPERLIPIDEMIA: ICD-10-CM

## 2022-08-30 LAB
CHOLEST SERPL-MCNC: 181 MG/DL (ref 100–199)
FASTING STATUS PATIENT QL REPORTED: NORMAL
HDLC SERPL-MCNC: 43 MG/DL
LDLC SERPL CALC-MCNC: 88 MG/DL
TRIGL SERPL-MCNC: 249 MG/DL (ref 0–149)

## 2022-08-30 PROCEDURE — 80061 LIPID PANEL: CPT

## 2022-08-30 PROCEDURE — 36415 COLL VENOUS BLD VENIPUNCTURE: CPT

## 2022-08-30 PROCEDURE — 99214 OFFICE O/P EST MOD 30 MIN: CPT | Performed by: FAMILY MEDICINE

## 2022-08-30 RX ORDER — UBIDECARENONE 75 MG
2500 CAPSULE ORAL DAILY
COMMUNITY

## 2022-08-30 RX ORDER — PREGABALIN 50 MG/1
50 CAPSULE ORAL
COMMUNITY
Start: 2021-12-07 | End: 2023-02-28

## 2022-08-30 RX ORDER — SENNOSIDES 8.6 MG
650 CAPSULE ORAL
COMMUNITY

## 2022-08-30 RX ORDER — SIMETHICONE 180 MG
1 CAPSULE ORAL DAILY
COMMUNITY

## 2022-08-30 ASSESSMENT — FIBROSIS 4 INDEX: FIB4 SCORE: 1.36

## 2022-08-31 PROBLEM — Z12.31 ENCOUNTER FOR SCREENING MAMMOGRAM FOR MALIGNANT NEOPLASM OF BREAST: Status: ACTIVE | Noted: 2022-08-31

## 2022-09-01 NOTE — ASSESSMENT & PLAN NOTE
Worsening TG  Diet changes advised  Is on rosuvastatin 10 mg daily   Latest Reference Range & Units Most Recent 1/8/21 09:11 1/3/22 07:12 8/30/22 07:54   Cholesterol,Tot 100 - 199 mg/dL 181  8/30/22 07:54 167 215 (H) 181   Triglycerides 0 - 149 mg/dL 249 (H)  8/30/22 07:54 153 (H) 226 (H) 249 (H)   HDL >=40 mg/dL 43  8/30/22 07:54 43 50 43   LDL <100 mg/dL 88  8/30/22 07:54 93 120 (H) 88   (H): Data is abnormally high

## 2022-09-01 NOTE — ASSESSMENT & PLAN NOTE
Weight has been improving with intentional weight loss following diet and exercise.   Encouraged patient to continue with gradual sustainable weight loss.

## 2022-09-01 NOTE — ASSESSMENT & PLAN NOTE
Pt's last screening mammogram was in 2020 and normal  Encouraged her to call to schedule her screening mammogram.

## 2022-09-01 NOTE — PROGRESS NOTES
Subjective:   Hanna Marcelo is a 72 y.o. female here today for evaluation and management of:     Obesity (BMI 30.0-34.9)  Weight has been improving with intentional weight loss following diet and exercise.   Encouraged patient to continue with gradual sustainable weight loss.     Encounter for screening mammogram for malignant neoplasm of breast  Pt's last screening mammogram was in 2020 and normal  Encouraged her to call to schedule her screening mammogram.       Hyperlipidemia  Worsening TG  Diet changes advised  Is on rosuvastatin 10 mg daily   Latest Reference Range & Units Most Recent 1/8/21 09:11 1/3/22 07:12 8/30/22 07:54   Cholesterol,Tot 100 - 199 mg/dL 181  8/30/22 07:54 167 215 (H) 181   Triglycerides 0 - 149 mg/dL 249 (H)  8/30/22 07:54 153 (H) 226 (H) 249 (H)   HDL >=40 mg/dL 43  8/30/22 07:54 43 50 43   LDL <100 mg/dL 88  8/30/22 07:54 93 120 (H) 88   (H): Data is abnormally high         Current medicines (including changes today)  Current Outpatient Medications   Medication Sig Dispense Refill    pregabalin (LYRICA) 50 MG capsule Take 50 mg by mouth.      cyclobenzaprine (FLEXERIL) 10 mg Tab TAKE ONE TABLET BY MOUTH THREE TIMES DAILY AS NEEDED FOR SPASMS 270 Tablet 3    omeprazole (PRILOSEC) 20 MG delayed-release capsule Take 1 Capsule by mouth 2 times a day. 180 Capsule 3    meloxicam (MOBIC) 7.5 MG Tab Take 1 Tablet by mouth every day. 90 Tablet 2    lisinopril-hydrochlorothiazide (PRINZIDE) 10-12.5 MG per tablet Take 1 Tablet by mouth every day. 90 Tablet 3    rosuvastatin (CRESTOR) 10 MG Tab Take 1 Tablet by mouth every evening. 100 Tablet 3    HYDROcodone-acetaminophen (NORCO) 7.5-325 MG per tablet TAKE 1 TABLET BY MOUTH EVERY 8 HOURS AS NEEDED FOR SEVERE PAIN . DO NOT DRIVE OR USE ALCOHOL AFTER TAKING      pregabalin (LYRICA) 50 MG capsule TAKE 1 CAPSULE BY MOUTH TWICE DAILY FOR NERVE PAIN      albuterol 108 (90 Base) MCG/ACT Aero Soln inhalation aerosol Inhale 2 Puffs every 6 hours as  "needed for Shortness of Breath. 8.5 g 3    Artificial Tear Ointment (DRY EYES OP) Place 1 Drop in both eyes 1 time daily as needed.      multivitamin (THERAGRAN) Tab Take 1 Tab by mouth every day.      Calcium Carb-Cholecalciferol 500-400 MG-UNIT Chew Tab Chew 1 Tablet every day.      Simethicone 180 MG Cap Take 1 Capsule by mouth every day.      cyanocobalamin (VITAMIN B-12) 100 MCG Tab Take 2,500 mcg by mouth every day.      acetaminophen (TYLENOL) 650 MG CR tablet Take 650 mg by mouth.       No current facility-administered medications for this visit.     She  has a past medical history of Arthritis, Breath shortness, Bronchitis, CAD (coronary artery disease), Dental disorder (07/2018), DJD (degenerative joint disease), Fibromyalgia, GERD (gastroesophageal reflux disease), Heart burn, Heart murmur, Hepatitis B, Hiatus hernia syndrome, High cholesterol, Hyperlipidemia, Hypertension, IBD (inflammatory bowel disease), Indigestion, and Pain.    ROS  No chest pain, no shortness of breath, no abdominal pain       Objective:     /70 (BP Location: Left arm, Patient Position: Sitting, BP Cuff Size: Small adult)   Pulse 82   Temp 36.7 °C (98 °F) (Temporal)   Resp 16   Ht 1.676 m (5' 6\")   Wt 85.3 kg (188 lb)   SpO2 94%  Body mass index is 30.34 kg/m².   Physical Exam:  Constitutional: Alert, no distress.  Skin: Warm, dry, good turgor, no rashes in visible areas.  Eye: Equal, round and reactive, conjunctiva clear, lids normal.  ENMT: Lips without lesions, good dentition, oropharynx clear.  Neck: Trachea midline, no masses, no thyromegaly. No cervical or supraclavicular lymphadenopathy  Respiratory: Unlabored respiratory effort, lungs clear to auscultation, no wheezes, no ronchi.  Cardiovascular: Normal S1, S2, no murmur, no edema.  Abdomen: Soft, non-tender, no masses, no hepatosplenomegaly.  Psych: Alert and oriented x3, normal affect and mood.        Assessment and Plan:   The following treatment plan was " discussed    1. Obesity (BMI 30.0-34.9)    2. Encounter for screening mammogram for malignant neoplasm of breast    3. Mixed hyperlipidemia    Other orders  - Calcium Carb-Cholecalciferol 500-400 MG-UNIT Chew Tab; Chew 1 Tablet every day.  - Simethicone 180 MG Cap; Take 1 Capsule by mouth every day.  - cyanocobalamin (VITAMIN B-12) 100 MCG Tab; Take 2,500 mcg by mouth every day.  - acetaminophen (TYLENOL) 650 MG CR tablet; Take 650 mg by mouth.  - pregabalin (LYRICA) 50 MG capsule; Take 50 mg by mouth.    Followup: Return in about 6 months (around 2/28/2023).

## 2023-02-06 DIAGNOSIS — M15.9 OSTEOARTHRITIS OF MULTIPLE JOINTS, UNSPECIFIED OSTEOARTHRITIS TYPE: ICD-10-CM

## 2023-02-06 NOTE — TELEPHONE ENCOUNTER
Received request via: Patient    Was the patient seen in the last year in this department? Yes    Does the patient have an active prescription (recently filled or refills available) for medication(s) requested? No    Does the patient have [facility] and need 100 day supply (blood pressure, diabetes and cholesterol meds only)? Patient does not have SCP    Last Office visit:08/30/2022  Last Labs:08/30/2022

## 2023-02-07 RX ORDER — MELOXICAM 7.5 MG/1
7.5 TABLET ORAL DAILY
Qty: 90 TABLET | Refills: 2 | Status: SHIPPED | OUTPATIENT
Start: 2023-02-07 | End: 2023-05-07 | Stop reason: SDUPTHER

## 2023-02-28 ENCOUNTER — OFFICE VISIT (OUTPATIENT)
Dept: MEDICAL GROUP | Facility: PHYSICIAN GROUP | Age: 73
End: 2023-02-28
Payer: MEDICARE

## 2023-02-28 VITALS
TEMPERATURE: 98.1 F | RESPIRATION RATE: 14 BRPM | SYSTOLIC BLOOD PRESSURE: 136 MMHG | OXYGEN SATURATION: 95 % | DIASTOLIC BLOOD PRESSURE: 80 MMHG | HEART RATE: 82 BPM | WEIGHT: 204 LBS | BODY MASS INDEX: 32.78 KG/M2 | HEIGHT: 66 IN

## 2023-02-28 DIAGNOSIS — M65.331 TRIGGER MIDDLE FINGER OF RIGHT HAND: ICD-10-CM

## 2023-02-28 DIAGNOSIS — E78.2 MIXED HYPERLIPIDEMIA: ICD-10-CM

## 2023-02-28 DIAGNOSIS — M15.9 OSTEOARTHRITIS OF MULTIPLE JOINTS, UNSPECIFIED OSTEOARTHRITIS TYPE: ICD-10-CM

## 2023-02-28 DIAGNOSIS — R53.82 CHRONIC FATIGUE: ICD-10-CM

## 2023-02-28 DIAGNOSIS — Z79.891 CHRONIC USE OF OPIATE FOR THERAPEUTIC PURPOSE: ICD-10-CM

## 2023-02-28 DIAGNOSIS — Z12.31 ENCOUNTER FOR SCREENING MAMMOGRAM FOR BREAST CANCER: ICD-10-CM

## 2023-02-28 DIAGNOSIS — M54.31 SCIATICA OF RIGHT SIDE: ICD-10-CM

## 2023-02-28 DIAGNOSIS — E55.9 VITAMIN D DEFICIENCY: ICD-10-CM

## 2023-02-28 PROCEDURE — 20550 NJX 1 TENDON SHEATH/LIGAMENT: CPT | Mod: F7 | Performed by: FAMILY MEDICINE

## 2023-02-28 PROCEDURE — 99214 OFFICE O/P EST MOD 30 MIN: CPT | Mod: 25 | Performed by: FAMILY MEDICINE

## 2023-02-28 RX ORDER — CEPHALEXIN 500 MG/1
CAPSULE ORAL
COMMUNITY
End: 2023-02-28

## 2023-02-28 RX ORDER — ONDANSETRON 2 MG/ML
INJECTION INTRAMUSCULAR; INTRAVENOUS
COMMUNITY
End: 2023-02-28

## 2023-02-28 RX ORDER — DEXTROSE MONOHYDRATE 50 MG/ML
INJECTION, SOLUTION INTRAVENOUS
COMMUNITY
End: 2023-02-28

## 2023-02-28 RX ORDER — DEXAMETHASONE SODIUM PHOSPHATE 4 MG/ML
INJECTION, SOLUTION INTRA-ARTICULAR; INTRALESIONAL; INTRAMUSCULAR; INTRAVENOUS; SOFT TISSUE
COMMUNITY
End: 2023-02-28

## 2023-02-28 RX ORDER — MAGNESIUM HYDROXIDE 1200 MG/15ML
LIQUID ORAL
COMMUNITY
End: 2023-02-28

## 2023-02-28 RX ORDER — HYDROCODONE BITARTRATE AND ACETAMINOPHEN 10; 325 MG/1; MG/1
TABLET ORAL
COMMUNITY
End: 2023-02-28

## 2023-02-28 RX ORDER — VANCOMYCIN HYDROCHLORIDE 1 G/20ML
INJECTION, POWDER, LYOPHILIZED, FOR SOLUTION INTRAVENOUS
COMMUNITY
End: 2023-02-28

## 2023-02-28 RX ORDER — PREGABALIN 50 MG/1
50 CAPSULE ORAL 3 TIMES DAILY
Qty: 90 CAPSULE | Refills: 5 | Status: SHIPPED | OUTPATIENT
Start: 2023-03-04 | End: 2023-04-03

## 2023-02-28 RX ORDER — CEFAZOLIN SODIUM 1 G/3ML
INJECTION, POWDER, FOR SOLUTION INTRAMUSCULAR; INTRAVENOUS
COMMUNITY
End: 2023-02-28

## 2023-02-28 RX ORDER — BUPIVACAINE HYDROCHLORIDE 5 MG/ML
INJECTION, SOLUTION EPIDURAL; INTRACAUDAL
COMMUNITY
End: 2023-02-28

## 2023-02-28 RX ORDER — DEXAMETHASONE SODIUM PHOSPHATE 10 MG/ML
INJECTION INTRAMUSCULAR; INTRAVENOUS
COMMUNITY
End: 2023-02-28

## 2023-02-28 RX ORDER — HYDROCODONE BITARTRATE AND ACETAMINOPHEN 7.5; 325 MG/1; MG/1
1 TABLET ORAL EVERY 8 HOURS PRN
Qty: 30 TABLET | Refills: 0 | Status: SHIPPED | OUTPATIENT
Start: 2023-02-28 | End: 2023-03-10

## 2023-02-28 RX ORDER — SODIUM CHLORIDE, SODIUM LACTATE, POTASSIUM CHLORIDE, CALCIUM CHLORIDE 600; 310; 30; 20 MG/100ML; MG/100ML; MG/100ML; MG/100ML
INJECTION, SOLUTION INTRAVENOUS
COMMUNITY
End: 2023-02-28

## 2023-02-28 RX ORDER — ROCURONIUM BROMIDE 10 MG/ML
INJECTION, SOLUTION INTRAVENOUS
COMMUNITY
End: 2023-02-28

## 2023-02-28 RX ORDER — LIDOCAINE HYDROCHLORIDE 20 MG/ML
INJECTION, SOLUTION INFILTRATION; PERINEURAL
COMMUNITY
End: 2023-02-28

## 2023-02-28 RX ORDER — TRIAMCINOLONE ACETONIDE 40 MG/ML
40 INJECTION, SUSPENSION INTRA-ARTICULAR; INTRAMUSCULAR ONCE
Status: COMPLETED | OUTPATIENT
Start: 2023-02-28 | End: 2023-02-28

## 2023-02-28 RX ORDER — OXYCODONE HCL 5 MG/5 ML
SOLUTION, ORAL ORAL
COMMUNITY
End: 2023-02-28

## 2023-02-28 RX ADMIN — TRIAMCINOLONE ACETONIDE 40 MG: 40 INJECTION, SUSPENSION INTRA-ARTICULAR; INTRAMUSCULAR at 13:13

## 2023-02-28 ASSESSMENT — PATIENT HEALTH QUESTIONNAIRE - PHQ9: CLINICAL INTERPRETATION OF PHQ2 SCORE: 0

## 2023-02-28 ASSESSMENT — FIBROSIS 4 INDEX: FIB4 SCORE: 1.14

## 2023-02-28 NOTE — ASSESSMENT & PLAN NOTE
Chronic condition  Had an epidural done by pain management but was in more pain after procedures months ago  Last rx for norco 7/5/325 was in Sept 2022. She does not plan to have more procedures done.   She requests me to take over rx for lyrica as she does not plan to have more back surgery and repeat surgery was not recommended at this time by her back surgeon.     This patient is continuing to use a controlled substance (CS) on a long term basis.  The patient is thoroughly aware of the goals of treatment with the CS  The patient is aware that yearly and random urine drug screens are required.  The patient has been instructed to take the CS only as prescribed.  The patient is prohibited from sharing the CS with any other person.  The patient is instructed to inform the provider if any other CS is taken, of any alcohol or cannabis or other recreational drug use, any treatment for side effects of the CS or complications, if they have CS active rx in other states  The patient has evidence for a reason for the CS  The treatment plan has been discussed with the patient  The  report has been reviewed    Refill for norco 7/5/325 30 tabs provided   Advised on risks

## 2023-02-28 NOTE — ASSESSMENT & PLAN NOTE
Locking and pain of right middle finger  She has to twist and pull it to release it when it locks  She had an injection of it twice in the past, has a finger splint to use at night.     Skin cleaned with alcohol pads  Lidocaine 1% without epinephrine 0.5 cc and 0.5 cc kenalog 40 cc/mg injected into palmar aspect, tendon sheath with no resistance.

## 2023-02-28 NOTE — PATIENT INSTRUCTIONS
Please get fasting labs, fasting for 8-10 hours before next visit. You can make an appointment for the lab or walk in.   Lab hours [facility] location: Monday to Friday 6 am - 4 pm, Saturday 7 am -noon  Even if you lose your lab paperwork, you can still come in to get your lab done.

## 2023-03-03 PROBLEM — M54.16 LUMBAR RADICULOPATHY: Status: ACTIVE | Noted: 2022-10-19

## 2023-03-03 NOTE — PROGRESS NOTES
Subjective:   Hanna Marcelo is a 72 y.o. female here today for evaluation and management of:     Trigger middle finger of right hand  Locking and pain of right middle finger  She has to twist and pull it to release it when it locks  She had an injection of it twice in the past, has a finger splint to use at night.     Skin cleaned with alcohol pads  Lidocaine 1% without epinephrine 0.5 cc and 0.5 cc kenalog 40 cc/mg injected into palmar aspect, tendon sheath with no resistance.     Sciatica of right side  Chronic condition  Had an epidural done by pain management but was in more pain after procedures months ago  Last rx for norco 7/5/325 was in Sept 2022. She does not plan to have more procedures done.   She requests me to take over rx for lyrica as she does not plan to have more back surgery and repeat surgery was not recommended at this time by her back surgeon.     This patient is continuing to use a controlled substance (CS) on a long term basis.  The patient is thoroughly aware of the goals of treatment with the CS  The patient is aware that yearly and random urine drug screens are required.  The patient has been instructed to take the CS only as prescribed.  The patient is prohibited from sharing the CS with any other person.  The patient is instructed to inform the provider if any other CS is taken, of any alcohol or cannabis or other recreational drug use, any treatment for side effects of the CS or complications, if they have CS active rx in other states  The patient has evidence for a reason for the CS  The treatment plan has been discussed with the patient  The  report has been reviewed    Refill for norco 7/5/325 30 tabs provided   Advised on risks     Trigger finger of right hand  Increasing pain and locking of finger in flexed position.     Trigger finger injection done for right 3rd digit plantar aspect after skin cleaned with alcohol swabs and injection with no resistance and aspiration with  no blood.   0.5 cc of 1% lidocaine without epinephrine and 0.5 cc of 40 mg/cc kenalog.                  Current medicines (including changes today)  Current Outpatient Medications   Medication Sig Dispense Refill    HYDROcodone-acetaminophen (NORCO) 7.5-325 MG tab Take 1 Tablet by mouth every 8 hours as needed for Severe Pain for up to 10 days. 30 Tablet 0    [START ON 3/4/2023] pregabalin (LYRICA) 50 MG capsule Take 1 Capsule by mouth 3 times a day for 30 days. 90 Capsule 5    meloxicam (MOBIC) 7.5 MG Tab Take 1 Tablet by mouth every day. 90 Tablet 2    Calcium Carb-Cholecalciferol 500-400 MG-UNIT Chew Tab Chew 1 Tablet every day.      Simethicone 180 MG Cap Take 1 Capsule by mouth every day.      cyanocobalamin (VITAMIN B-12) 100 MCG Tab Take 2,500 mcg by mouth every day.      acetaminophen (TYLENOL) 650 MG CR tablet Take 650 mg by mouth.      cyclobenzaprine (FLEXERIL) 10 mg Tab TAKE ONE TABLET BY MOUTH THREE TIMES DAILY AS NEEDED FOR SPASMS 270 Tablet 3    omeprazole (PRILOSEC) 20 MG delayed-release capsule Take 1 Capsule by mouth 2 times a day. 180 Capsule 3    lisinopril-hydrochlorothiazide (PRINZIDE) 10-12.5 MG per tablet Take 1 Tablet by mouth every day. 90 Tablet 3    rosuvastatin (CRESTOR) 10 MG Tab Take 1 Tablet by mouth every evening. 100 Tablet 3    HYDROcodone-acetaminophen (NORCO) 7.5-325 MG per tablet TAKE 1 TABLET BY MOUTH EVERY 8 HOURS AS NEEDED FOR SEVERE PAIN . DO NOT DRIVE OR USE ALCOHOL AFTER TAKING      albuterol 108 (90 Base) MCG/ACT Aero Soln inhalation aerosol Inhale 2 Puffs every 6 hours as needed for Shortness of Breath. 8.5 g 3    Artificial Tear Ointment (DRY EYES OP) Place 1 Drop in both eyes 1 time daily as needed.      multivitamin (THERAGRAN) Tab Take 1 Tab by mouth every day.       No current facility-administered medications for this visit.     She  has a past medical history of Arthritis, Breath shortness, Bronchitis, CAD (coronary artery disease), Dental disorder (07/2018), DJD  "(degenerative joint disease), Fibromyalgia, GERD (gastroesophageal reflux disease), Heart burn, Heart murmur, Hepatitis B, Hiatus hernia syndrome, High cholesterol, Hyperlipidemia, Hypertension, IBD (inflammatory bowel disease), Indigestion, and Pain.    ROS  No chest pain, no shortness of breath, no abdominal pain       Objective:     /80 (BP Location: Left arm, Patient Position: Sitting, BP Cuff Size: Adult)   Pulse 82   Temp 36.7 °C (98.1 °F) (Temporal)   Resp 14   Ht 1.676 m (5' 6\")   Wt 92.5 kg (204 lb)   SpO2 95%  Body mass index is 32.93 kg/m².   Physical Exam:  Constitutional: Alert, no distress.  Skin: Warm, dry, good turgor, no rashes in visible areas.  Eye: Equal, round and reactive, conjunctiva clear, lids normal.  ENMT: Lips without lesions, good dentition, oropharynx clear.  Neck: Trachea midline, no masses, no thyromegaly. No cervical or supraclavicular lymphadenopathy  Respiratory: Unlabored respiratory effort, lungs clear to auscultation, no wheezes, no ronchi.  Cardiovascular: Normal S1, S2, no murmur, no edema.  Abdomen: Soft, non-tender, no masses, no hepatosplenomegaly.  Psych: Alert and oriented x3, normal affect and mood.        Assessment and Plan:   The following treatment plan was discussed    1. Encounter for screening mammogram for breast cancer  - MA-SCREENING MAMMO BILAT W/CAD; Future    2. Trigger middle finger of right hand    3. Sciatica of right side  - HYDROcodone-acetaminophen (NORCO) 7.5-325 MG tab; Take 1 Tablet by mouth every 8 hours as needed for Severe Pain for up to 10 days.  Dispense: 30 Tablet; Refill: 0  - pregabalin (LYRICA) 50 MG capsule; Take 1 Capsule by mouth 3 times a day for 30 days.  Dispense: 90 Capsule; Refill: 5    4. Osteoarthritis of multiple joints, unspecified osteoarthritis type  - HYDROcodone-acetaminophen (NORCO) 7.5-325 MG tab; Take 1 Tablet by mouth every 8 hours as needed for Severe Pain for up to 10 days.  Dispense: 30 Tablet; Refill: " 0  - Comp Metabolic Panel; Future    5. Chronic use of opiate for therapeutic purpose  - HYDROcodone-acetaminophen (NORCO) 7.5-325 MG tab; Take 1 Tablet by mouth every 8 hours as needed for Severe Pain for up to 10 days.  Dispense: 30 Tablet; Refill: 0  - Controlled Substance Treatment Agreement    6. Mixed hyperlipidemia  - Lipid Profile; Future    7. Vitamin D deficiency  - VITAMIN D,25 HYDROXY (DEFICIENCY); Future    8. Chronic fatigue  - CBC WITH DIFFERENTIAL; Future    Other orders  - Consent for Opiate Prescription  - triamcinolone acetonide (KENALOG-40) injection 40 mg      Followup: Return in about 3 months (around 5/28/2023) for norco refill, lyrica refills, right knee injection.

## 2023-03-03 NOTE — ASSESSMENT & PLAN NOTE
Increasing pain and locking of finger in flexed position.     Trigger finger injection done for right 3rd digit plantar aspect after skin cleaned with alcohol swabs and injection with no resistance and aspiration with no blood.   0.5 cc of 1% lidocaine without epinephrine and 0.5 cc of 40 mg/cc kenalog.

## 2023-03-14 RX ORDER — ROSUVASTATIN CALCIUM 10 MG/1
TABLET, COATED ORAL
Qty: 90 TABLET | Refills: 3 | Status: SHIPPED | OUTPATIENT
Start: 2023-03-14

## 2023-03-30 ENCOUNTER — HOSPITAL ENCOUNTER (OUTPATIENT)
Dept: LAB | Facility: MEDICAL CENTER | Age: 73
End: 2023-03-30
Attending: FAMILY MEDICINE
Payer: MEDICARE

## 2023-03-30 DIAGNOSIS — R53.82 CHRONIC FATIGUE: ICD-10-CM

## 2023-03-30 DIAGNOSIS — E78.2 MIXED HYPERLIPIDEMIA: ICD-10-CM

## 2023-03-30 DIAGNOSIS — E55.9 VITAMIN D DEFICIENCY: ICD-10-CM

## 2023-03-30 DIAGNOSIS — M15.9 OSTEOARTHRITIS OF MULTIPLE JOINTS, UNSPECIFIED OSTEOARTHRITIS TYPE: ICD-10-CM

## 2023-03-30 LAB
25(OH)D3 SERPL-MCNC: 42 NG/ML (ref 30–100)
ALBUMIN SERPL BCP-MCNC: 4.4 G/DL (ref 3.2–4.9)
ALBUMIN/GLOB SERPL: 1.5 G/DL
ALP SERPL-CCNC: 93 U/L (ref 30–99)
ALT SERPL-CCNC: 18 U/L (ref 2–50)
ANION GAP SERPL CALC-SCNC: 14 MMOL/L (ref 7–16)
AST SERPL-CCNC: 21 U/L (ref 12–45)
BASOPHILS # BLD AUTO: 0.9 % (ref 0–1.8)
BASOPHILS # BLD: 0.06 K/UL (ref 0–0.12)
BILIRUB SERPL-MCNC: 0.9 MG/DL (ref 0.1–1.5)
BUN SERPL-MCNC: 25 MG/DL (ref 8–22)
CALCIUM ALBUM COR SERPL-MCNC: 9.4 MG/DL (ref 8.5–10.5)
CALCIUM SERPL-MCNC: 9.7 MG/DL (ref 8.5–10.5)
CHLORIDE SERPL-SCNC: 102 MMOL/L (ref 96–112)
CHOLEST SERPL-MCNC: 196 MG/DL (ref 100–199)
CO2 SERPL-SCNC: 23 MMOL/L (ref 20–33)
CREAT SERPL-MCNC: 1.06 MG/DL (ref 0.5–1.4)
EOSINOPHIL # BLD AUTO: 0.22 K/UL (ref 0–0.51)
EOSINOPHIL NFR BLD: 3.3 % (ref 0–6.9)
ERYTHROCYTE [DISTWIDTH] IN BLOOD BY AUTOMATED COUNT: 45.7 FL (ref 35.9–50)
FASTING STATUS PATIENT QL REPORTED: NORMAL
GFR SERPLBLD CREATININE-BSD FMLA CKD-EPI: 56 ML/MIN/1.73 M 2
GLOBULIN SER CALC-MCNC: 2.9 G/DL (ref 1.9–3.5)
GLUCOSE SERPL-MCNC: 103 MG/DL (ref 65–99)
HCT VFR BLD AUTO: 45.2 % (ref 37–47)
HDLC SERPL-MCNC: 56 MG/DL
HGB BLD-MCNC: 14.9 G/DL (ref 12–16)
IMM GRANULOCYTES # BLD AUTO: 0.02 K/UL (ref 0–0.11)
IMM GRANULOCYTES NFR BLD AUTO: 0.3 % (ref 0–0.9)
LDLC SERPL CALC-MCNC: 104 MG/DL
LYMPHOCYTES # BLD AUTO: 2.26 K/UL (ref 1–4.8)
LYMPHOCYTES NFR BLD: 34.1 % (ref 22–41)
MCH RBC QN AUTO: 29.8 PG (ref 27–33)
MCHC RBC AUTO-ENTMCNC: 33 G/DL (ref 33.6–35)
MCV RBC AUTO: 90.4 FL (ref 81.4–97.8)
MONOCYTES # BLD AUTO: 0.65 K/UL (ref 0–0.85)
MONOCYTES NFR BLD AUTO: 9.8 % (ref 0–13.4)
NEUTROPHILS # BLD AUTO: 3.42 K/UL (ref 2–7.15)
NEUTROPHILS NFR BLD: 51.6 % (ref 44–72)
NRBC # BLD AUTO: 0 K/UL
NRBC BLD-RTO: 0 /100 WBC
PLATELET # BLD AUTO: 287 K/UL (ref 164–446)
PMV BLD AUTO: 9.5 FL (ref 9–12.9)
POTASSIUM SERPL-SCNC: 3.7 MMOL/L (ref 3.6–5.5)
PROT SERPL-MCNC: 7.3 G/DL (ref 6–8.2)
RBC # BLD AUTO: 5 M/UL (ref 4.2–5.4)
SODIUM SERPL-SCNC: 139 MMOL/L (ref 135–145)
TRIGL SERPL-MCNC: 178 MG/DL (ref 0–149)
WBC # BLD AUTO: 6.6 K/UL (ref 4.8–10.8)

## 2023-03-30 PROCEDURE — 36415 COLL VENOUS BLD VENIPUNCTURE: CPT

## 2023-03-30 PROCEDURE — 82306 VITAMIN D 25 HYDROXY: CPT

## 2023-03-30 PROCEDURE — 80053 COMPREHEN METABOLIC PANEL: CPT

## 2023-03-30 PROCEDURE — 85025 COMPLETE CBC W/AUTO DIFF WBC: CPT

## 2023-03-30 PROCEDURE — 80061 LIPID PANEL: CPT

## 2023-04-04 ENCOUNTER — APPOINTMENT (OUTPATIENT)
Dept: RADIOLOGY | Facility: MEDICAL CENTER | Age: 73
End: 2023-04-04
Attending: FAMILY MEDICINE
Payer: MEDICARE

## 2023-05-07 DIAGNOSIS — M15.9 OSTEOARTHRITIS OF MULTIPLE JOINTS, UNSPECIFIED OSTEOARTHRITIS TYPE: ICD-10-CM

## 2023-05-07 DIAGNOSIS — I10 ESSENTIAL HYPERTENSION: ICD-10-CM

## 2023-05-08 RX ORDER — MELOXICAM 7.5 MG/1
7.5 TABLET ORAL DAILY
Qty: 90 TABLET | Refills: 2 | Status: SHIPPED | OUTPATIENT
Start: 2023-05-08

## 2023-05-08 RX ORDER — LISINOPRIL AND HYDROCHLOROTHIAZIDE 12.5; 1 MG/1; MG/1
1 TABLET ORAL DAILY
Qty: 90 TABLET | Refills: 0 | Status: SHIPPED | OUTPATIENT
Start: 2023-05-08 | End: 2023-08-02

## 2023-05-08 NOTE — TELEPHONE ENCOUNTER
Received request via: Pharmacy    Was the patient seen in the last year in this department? Yes    Does the patient have an active prescription (recently filled or refills available) for medication(s) requested? No    Does the patient have [facility] and need 100 day supply (blood pressure, diabetes and cholesterol meds only)? Patient does not have SCP    Last OV: 2/28/23  Last labs: 3/30/23

## 2023-06-07 ENCOUNTER — APPOINTMENT (OUTPATIENT)
Dept: RADIOLOGY | Facility: IMAGING CENTER | Age: 73
End: 2023-06-07
Attending: FAMILY MEDICINE
Payer: MEDICARE

## 2023-06-07 ENCOUNTER — NON-PROVIDER VISIT (OUTPATIENT)
Dept: URGENT CARE | Facility: PHYSICIAN GROUP | Age: 73
End: 2023-06-07
Payer: MEDICARE

## 2023-06-07 ENCOUNTER — OFFICE VISIT (OUTPATIENT)
Dept: MEDICAL GROUP | Facility: PHYSICIAN GROUP | Age: 73
End: 2023-06-07
Payer: MEDICARE

## 2023-06-07 VITALS
BODY MASS INDEX: 32.3 KG/M2 | OXYGEN SATURATION: 94 % | HEIGHT: 66 IN | WEIGHT: 201 LBS | SYSTOLIC BLOOD PRESSURE: 136 MMHG | RESPIRATION RATE: 14 BRPM | HEART RATE: 96 BPM | TEMPERATURE: 97.7 F | DIASTOLIC BLOOD PRESSURE: 74 MMHG

## 2023-06-07 DIAGNOSIS — G57.93 NEUROPATHY INVOLVING BOTH LOWER EXTREMITIES: ICD-10-CM

## 2023-06-07 DIAGNOSIS — Z78.0 MENOPAUSE: ICD-10-CM

## 2023-06-07 DIAGNOSIS — R11.0 NAUSEA: ICD-10-CM

## 2023-06-07 DIAGNOSIS — M54.16 LUMBAR RADICULOPATHY: ICD-10-CM

## 2023-06-07 DIAGNOSIS — R94.4 DECREASED GFR: ICD-10-CM

## 2023-06-07 PROCEDURE — 3075F SYST BP GE 130 - 139MM HG: CPT | Performed by: FAMILY MEDICINE

## 2023-06-07 PROCEDURE — 99214 OFFICE O/P EST MOD 30 MIN: CPT | Performed by: FAMILY MEDICINE

## 2023-06-07 PROCEDURE — 3078F DIAST BP <80 MM HG: CPT | Performed by: FAMILY MEDICINE

## 2023-06-07 PROCEDURE — 72100 X-RAY EXAM L-S SPINE 2/3 VWS: CPT | Mod: TC,FY

## 2023-06-07 RX ORDER — ONDANSETRON 4 MG/1
4 TABLET, FILM COATED ORAL EVERY 4 HOURS PRN
Qty: 20 TABLET | Refills: 1 | Status: SHIPPED | OUTPATIENT
Start: 2023-06-07

## 2023-06-07 RX ORDER — DULOXETIN HYDROCHLORIDE 20 MG/1
20 CAPSULE, DELAYED RELEASE ORAL DAILY
Qty: 90 CAPSULE | Refills: 3 | Status: SHIPPED | OUTPATIENT
Start: 2023-06-07

## 2023-06-07 RX ORDER — HYDROCODONE BITARTRATE AND ACETAMINOPHEN 7.5; 325 MG/1; MG/1
TABLET ORAL
Qty: 20 TABLET | Refills: 0 | Status: SHIPPED | OUTPATIENT
Start: 2023-06-07 | End: 2023-07-07

## 2023-06-07 ASSESSMENT — FIBROSIS 4 INDEX: FIB4 SCORE: 1.22

## 2023-06-07 NOTE — ASSESSMENT & PLAN NOTE
For a few weeks she has persistent nausea,   She has history of abdominal surgery, history of ulcers,   Is on omeprazole.   Last colonoscopy: 2021 done for diarrhea showed normal mucosa and internal hemorrhoids.   Ref to GI done for possible EGD.   rx for zofran provided.

## 2023-06-07 NOTE — PROGRESS NOTES
Subjective:   Hanna Marcelo is a 73 y.o. female here today for evaluation and management of:     Lumbar radiculopathy  She had back surgery Jan 2021  Continues to have significant neuropathy of both LE and right sciatica  She takes norco sparingly just as needed to go to the store.   Gabapentin made her very foggy. Lyrica did not help.   She would like to try cymbalta/duloxetine. rx for 20 mg provided.   EMG ordered.   Feet go cold, tingling numb pain of both legs, she also had recent falls.   She declines PT and home health. She will get a cane from [facility] as I recommend getting a cane.       Decreased GFR  Repeat renal function ordered.       Nausea  For a few weeks she has persistent nausea,   She has history of abdominal surgery, history of ulcers,   Is on omeprazole.   Last colonoscopy: 2021 done for diarrhea showed normal mucosa and internal hemorrhoids.   Ref to GI done for possible EGD.   rx for zofran provided.                Current medicines (including changes today)  Current Outpatient Medications   Medication Sig Dispense Refill    ondansetron (ZOFRAN) 4 MG Tab tablet Take 1 Tablet by mouth every four hours as needed for Nausea/Vomiting. 20 Tablet 1    DULoxetine (CYMBALTA) 20 MG Cap DR Particles Take 1 Capsule by mouth every day. 90 Capsule 3    HYDROcodone-acetaminophen (NORCO) 7.5-325 MG tab TAKE 1 TABLET BY MOUTH EVERY 8 HOURS AS NEEDED FOR SEVERE PAIN . DO NOT DRIVE OR USE ALCOHOL AFTER TAKING 20 Tablet 0    meloxicam (MOBIC) 7.5 MG Tab Take 1 Tablet by mouth every day. 90 Tablet 2    lisinopril-hydrochlorothiazide (PRINZIDE) 10-12.5 MG per tablet Take 1 Tablet by mouth every day. 90 Tablet 0    rosuvastatin (CRESTOR) 10 MG Tab TAKE 1 TABLET BY MOUTH ONCE DAILY IN THE EVENING 90 Tablet 3    Calcium Carb-Cholecalciferol 500-400 MG-UNIT Chew Tab Chew 1 Tablet every day.      Simethicone 180 MG Cap Take 1 Capsule by mouth every day.      cyanocobalamin (VITAMIN B-12) 100 MCG Tab Take  "2,500 mcg by mouth every day.      acetaminophen (TYLENOL) 650 MG CR tablet Take 650 mg by mouth.      cyclobenzaprine (FLEXERIL) 10 mg Tab TAKE ONE TABLET BY MOUTH THREE TIMES DAILY AS NEEDED FOR SPASMS 270 Tablet 3    omeprazole (PRILOSEC) 20 MG delayed-release capsule Take 1 Capsule by mouth 2 times a day. 180 Capsule 3    albuterol 108 (90 Base) MCG/ACT Aero Soln inhalation aerosol Inhale 2 Puffs every 6 hours as needed for Shortness of Breath. 8.5 g 3    Artificial Tear Ointment (DRY EYES OP) Place 1 Drop in both eyes 1 time daily as needed.      multivitamin (THERAGRAN) Tab Take 1 Tab by mouth every day.       No current facility-administered medications for this visit.     She  has a past medical history of Arthritis, Breath shortness, Bronchitis, CAD (coronary artery disease), Dental disorder (07/2018), DJD (degenerative joint disease), Fibromyalgia, GERD (gastroesophageal reflux disease), Heart burn, Heart murmur, Hepatitis B, Hiatus hernia syndrome, High cholesterol, Hyperlipidemia, Hypertension, IBD (inflammatory bowel disease), Indigestion, and Pain.    ROS  No chest pain, no shortness of breath, no abdominal pain       Objective:     /74   Pulse 96   Temp 36.5 °C (97.7 °F) (Temporal)   Resp 14   Ht 1.676 m (5' 6\")   Wt 91.2 kg (201 lb)   SpO2 94%  Body mass index is 32.44 kg/m².   Physical Exam:  Constitutional: Alert, no distress.  Skin: Warm, dry, good turgor, no rashes in visible areas.  Eye: Equal, round and reactive, conjunctiva clear, lids normal.  ENMT: Lips without lesions, good dentition, oropharynx clear.  Neck: Trachea midline, no masses, no thyromegaly. No cervical or supraclavicular lymphadenopathy  Respiratory: Unlabored respiratory effort, lungs clear to auscultation, no wheezes, no ronchi.  Cardiovascular: Normal S1, S2, no murmur, no edema.  Abdomen: Soft, non-tender, no masses, no hepatosplenomegaly.  Psych: Alert and oriented x3, normal affect and mood.        Assessment " and Plan:   The following treatment plan was discussed    1. Neuropathy involving both lower extremities  - Referral to Neurodiagnostics (EEG,EP,EMG/NCS/DBS)  - DX-LUMBAR SPINE-2 OR 3 VIEWS; Future  - HYDROcodone-acetaminophen (NORCO) 7.5-325 MG tab; TAKE 1 TABLET BY MOUTH EVERY 8 HOURS AS NEEDED FOR SEVERE PAIN . DO NOT DRIVE OR USE ALCOHOL AFTER TAKING  Dispense: 20 Tablet; Refill: 0    2. Lumbar radiculopathy  - HYDROcodone-acetaminophen (NORCO) 7.5-325 MG tab; TAKE 1 TABLET BY MOUTH EVERY 8 HOURS AS NEEDED FOR SEVERE PAIN . DO NOT DRIVE OR USE ALCOHOL AFTER TAKING  Dispense: 20 Tablet; Refill: 0    3. Decreased GFR  - Renal Function Panel; Future    4. Menopause  - DS-BONE DENSITY STUDY (DEXA); Future    5. Nausea  - Referral to Gastroenterology    Other orders  - ondansetron (ZOFRAN) 4 MG Tab tablet; Take 1 Tablet by mouth every four hours as needed for Nausea/Vomiting.  Dispense: 20 Tablet; Refill: 1  - DULoxetine (CYMBALTA) 20 MG Cap DR Particles; Take 1 Capsule by mouth every day.  Dispense: 90 Capsule; Refill: 3  - Consent for Opiate Prescription      Followup: Return in about 3 months (around 9/7/2023) for neuropathy, chronic pain, deg gfr.

## 2023-06-07 NOTE — PATIENT INSTRUCTIONS
Please call  to schedule your emg, mammogram and bone density scan.     Please get fasting labs, fasting for 8-10 hours before next visit. You can make an appointment for the lab or walk in.   Lab hours [facility] location: Monday to Friday 6 am - 4 pm, Saturday 7 am -noon  Even if you lose your lab paperwork, you can still come in to get your lab done.

## 2023-06-07 NOTE — ASSESSMENT & PLAN NOTE
She had back surgery Jan 2021  Continues to have significant neuropathy of both LE and right sciatica  She takes norco sparingly just as needed to go to the store.   Gabapentin made her very foggy. Lyrica did not help.   She would like to try cymbalta/duloxetine. rx for 20 mg provided.   EMG ordered.   Feet go cold, tingling numb pain of both legs, she also had recent falls.   She declines PT and home health. She will get a cane from [facility] as I recommend getting a cane.

## 2023-06-09 DIAGNOSIS — K21.9 GASTROESOPHAGEAL REFLUX DISEASE WITHOUT ESOPHAGITIS: ICD-10-CM

## 2023-06-12 NOTE — TELEPHONE ENCOUNTER
Received request via: Pharmacy    Was the patient seen in the last year in this department? Yes    Does the patient have an active prescription (recently filled or refills available) for medication(s) requested? No    Does the patient have [facility] and need 100 day supply (blood pressure, diabetes and cholesterol meds only)? Patient does not have SCP      Last Office Visit:06/07/2023  Last Labs:03/30/2023

## 2023-06-13 ENCOUNTER — HOSPITAL ENCOUNTER (OUTPATIENT)
Dept: RADIOLOGY | Facility: MEDICAL CENTER | Age: 73
End: 2023-06-13
Attending: FAMILY MEDICINE
Payer: MEDICARE

## 2023-06-13 DIAGNOSIS — Z12.31 ENCOUNTER FOR SCREENING MAMMOGRAM FOR BREAST CANCER: ICD-10-CM

## 2023-06-13 PROCEDURE — 77063 BREAST TOMOSYNTHESIS BI: CPT

## 2023-06-13 RX ORDER — OMEPRAZOLE 20 MG/1
CAPSULE, DELAYED RELEASE ORAL
Qty: 180 CAPSULE | Refills: 3 | Status: SHIPPED | OUTPATIENT
Start: 2023-06-13

## 2023-06-16 ENCOUNTER — NON-PROVIDER VISIT (OUTPATIENT)
Dept: NEUROLOGY | Facility: MEDICAL CENTER | Age: 73
End: 2023-06-16
Attending: SPECIALIST
Payer: MEDICARE

## 2023-06-16 DIAGNOSIS — G57.93 NEUROPATHIC PAIN, LEG, BILATERAL: ICD-10-CM

## 2023-06-16 PROCEDURE — 95886 MUSC TEST DONE W/N TEST COMP: CPT | Mod: 26 | Performed by: SPECIALIST

## 2023-06-16 PROCEDURE — 95886 MUSC TEST DONE W/N TEST COMP: CPT | Performed by: SPECIALIST

## 2023-06-16 PROCEDURE — 95909 NRV CNDJ TST 5-6 STUDIES: CPT | Mod: 26 | Performed by: SPECIALIST

## 2023-06-16 PROCEDURE — 95909 NRV CNDJ TST 5-6 STUDIES: CPT | Performed by: SPECIALIST

## 2023-06-16 NOTE — PROCEDURES
NERVE CONDUCTION STUDIES AND ELECTROMYOGRAPHY REPORT        06/16/23      Referring provider: Fernando Irwin M.D.      SUMMARY OF PATIENT'S CLINICAL HISTORY,PHYSICAL EXAM, AND RATIONALE FOR TESTING:    Ms. Hanna Marcelo 73 y.o. presenting with bilateral lower extremity numbness and pain, right-sided radiating pain from her back down her leg.  The patient is status post lumbar surgery in January 2021.    Past Medical History is significant for :   Past Medical History:   Diagnosis Date    Arthritis     osteo    Breath shortness     Bronchitis     CAD (coronary artery disease)     Dental disorder 07/2018    oral surgery-teeth removal    DJD (degenerative joint disease)     Fibromyalgia     GERD (gastroesophageal reflux disease)     Heart burn     Heart murmur     as a child    Hepatitis B     Hiatus hernia syndrome     High cholesterol     Hyperlipidemia     Hypertension     IBD (inflammatory bowel disease)     Indigestion     Pain     generalized       The electrodiagnostic studies were performed to evaluate for possible peripheral neuropathy versus radiculopathy.      ELECTRODIAGNOSTIC EXAMINATION:  Nerve conduction studies (NCS) and electromyography (EMG) are utilized to evaluate direct or indirect damage to the peripheral nervous system. NCS are performed to measure the nerve(s) response(s) to electrostimulation across a given nerve segment. EMG evaluates the passive and active electrical activity of the muscle(s) in question.  Muscles are innervated by specific peripheral nerves and roots. Often times, several nerves the muscle to be examined in order to determine the presence or absence of the disease process. Furthermore, nerves and muscles may need to be tested in a [id number]-[id number] comparison, as well as in additional extremities, as this may be crucial in characterizing the extent of the disease process, which may be diffuse or isolated and of varying degree of severity. The extent of the neurodiagnostic  exam is justified as it may help arrive to a proper diagnosis, which ultimately may contribute to better management of the patient. Therefore, the nerves to muscles examined during the study were medically necessary.    Unless otherwise noted, temperature of the extremity(s) study was monitored before and during the examination and remained between 32 and 36 degrees C for the upper extremities, and between 30 and 36 degrees C for the lower extremities.      NERVE CONDUCTION STUDIES:  Sensory nerves:   - Bilateral Sural nerves were tested. The responses were within normal limits.    Motor nerves:   - Bilateral Tibial nerves were examined. Recording electrodes placed at the Abductor     Hallucis muscles. The responses were within normal limits.  - Bilateral Deep Peroneal motor nerves were examined. Recording electrodes were placed at the Extensor Digitorum Brevis muscles.The responses were within normal limits.     No temporal dispersion or conduction block observed in any of the motor nerves examined.        ELECTROMYOGRAPHY:  The study was performed the concentric needle electrode. Fibrillation and fasciculation activity is graded by convention from none (0) to continuous (4+).  Needle electrode examination was performed in the following muscles: Bilateral vastus lateralis, tibialis anterior, gastrocnemius, extensor digitorum brevis, abductor hallucis.  Acute denervation changes with fibrillation potentials were noted in the right gastrocnemius muscle.  All other muscles studied were normal electrophysiologically.      Nerve Conduction Studies     Stim Site NR Onset (ms) Norm Onset (ms) O-P Amp (µV) Norm O-P Amp Site1 Site2 Delta-P (ms) Dist (cm) Raghavendra (m/s) Norm Raghavendra (m/s)   Left Sural Anti Sensory (Lat Mall)   Calf    2.8 <4.6 5.8 >3 Calf Lat Mall 3.8 14.0 *37 >40   Right Sural Anti Sensory (Lat Mall)   Calf    3.1 <4.6 7.5 >3 Calf Lat Mall 3.8 14.0 *37 >40        Stim Site NR Onset (ms) Norm Onset (ms) O-P Amp (mV)  Norm O-P Amp Site1 Site2 Delta-0 (ms) Dist (cm) Raghavendra (m/s) Norm Raghavendra (m/s)   Left Peroneal EDB Motor (Ext Dig Brev)   Ankle    4.5 <6 4.1 >2.5 B Fib Ankle 7.4 35.0 47 >40   B Fib    11.9  3.8  Poplt B Fib 1.5 10.0 67    Poplt    13.4  3.9          Right Peroneal EDB Motor (Ext Dig Brev)   Ankle    3.9 <6 4.1 >2.5 B Fib Ankle 7.9 36.0 46 >40   B Fib    11.8  3.2  Poplt B Fib 1.1 10.0 91    Poplt    12.9  3.1          Left Tibial Motor (Abd Brush Brev)   Ankle    4.0 <6 12.9 >4 Knee Ankle 8.4 42.0 50 >40   Knee    12.4  12.8          Right Tibial Motor (Abd Brush Brev)   Ankle    3.8 <6 9.4 >4 Knee Ankle 8.7 43.0 49 >40   Knee    12.5  6.7                          Electromyography     Side Muscle Nerve Root Ins Act Fibs Psw Amp Dur Poly Recrt Int Pat Comment   Left VastusLat Femoral L2-4 Nml Nml Nml Nml Nml 0 Nml Nml    Left AntTibialis Dp Br Fibular L4-5 Nml Nml Nml Nml Nml 0 Nml Nml    Left Gastroc Tibial S1-2 Nml Nml Nml Nml Nml 0 Nml Nml    Left Ext Dig Brev Dp Br Fibular L5, S1 Nml Nml Nml Nml Nml 0 Nml Nml    Left AbdHallucis MedPlantar S1-2 Nml Nml Nml Nml Nml 0 Nml Nml    Right VastusLat Femoral L2-4 Nml Nml Nml Nml Nml 0 Nml Nml    Right AntTibialis Dp Br Fibular L4-5 Nml Nml Nml Nml Nml 0 Nml Nml    Right Gastroc Tibial S1-2 *Incr *2+ Nml Nml Nml 0 Nml Nml    Right Ext Dig Brev Dp Br Fibular L5, S1 Nml Nml Nml Nml Nml 0 Nml Nml    Right AbdHallucis MedPlantar S1-2 Nml Nml Nml Nml Nml 0 Nml Nml              DIAGNOSTIC INTERPRETATION:   Extensive electrodiagnostic studies were performed to the bilateral lower extremities.  The results are as follows:    1.  Acute denervation changes with fibrillation potentials noted in the right gastrocnemius muscle.  Clinical correlation is suggested, this is suggestive of a right S1 radiculopathy.    2.  Normal bilateral tibial and peroneal motor and sural sensory conduction studies.  There was no evidence of a large fiber neuropathy.  Clinical correlation is suggested, a small  fiber sensory neuropathy can present with normal nerve conduction studies.    3.  No evidence of radiculopathy in selected muscles studied left lower extremity.        SOY Diop M.D. (No note.)

## 2023-06-22 DIAGNOSIS — R92.8 ABNORMAL SCREENING MAMMOGRAM: ICD-10-CM

## 2023-07-18 ENCOUNTER — HOSPITAL ENCOUNTER (OUTPATIENT)
Dept: RADIOLOGY | Facility: MEDICAL CENTER | Age: 73
End: 2023-07-18
Attending: FAMILY MEDICINE
Payer: MEDICARE

## 2023-07-18 DIAGNOSIS — Z78.0 MENOPAUSE: ICD-10-CM

## 2023-07-18 PROCEDURE — 77080 DXA BONE DENSITY AXIAL: CPT

## 2023-07-21 NOTE — RESULT ENCOUNTER NOTE
Normal but decreasing bone density of femur. Pt has appt sept to discuss results, ca, vit d, alendronate  Fernando Irwin M.D.  
resilient/elastic

## 2023-07-26 ENCOUNTER — HOSPITAL ENCOUNTER (OUTPATIENT)
Dept: LAB | Facility: MEDICAL CENTER | Age: 73
End: 2023-07-26
Attending: FAMILY MEDICINE
Payer: MEDICARE

## 2023-07-26 DIAGNOSIS — R94.4 DECREASED GFR: ICD-10-CM

## 2023-07-26 PROCEDURE — 36415 COLL VENOUS BLD VENIPUNCTURE: CPT

## 2023-07-26 PROCEDURE — 80069 RENAL FUNCTION PANEL: CPT

## 2023-07-27 LAB
ALBUMIN SERPL BCP-MCNC: 4.2 G/DL (ref 3.2–4.9)
BUN SERPL-MCNC: 19 MG/DL (ref 8–22)
CALCIUM ALBUM COR SERPL-MCNC: 9.3 MG/DL (ref 8.5–10.5)
CALCIUM SERPL-MCNC: 9.5 MG/DL (ref 8.5–10.5)
CHLORIDE SERPL-SCNC: 107 MMOL/L (ref 96–112)
CO2 SERPL-SCNC: 25 MMOL/L (ref 20–33)
CREAT SERPL-MCNC: 0.78 MG/DL (ref 0.5–1.4)
GFR SERPLBLD CREATININE-BSD FMLA CKD-EPI: 81 ML/MIN/1.73 M 2
GLUCOSE SERPL-MCNC: 93 MG/DL (ref 65–99)
PHOSPHATE SERPL-MCNC: 3.4 MG/DL (ref 2.5–4.5)
POTASSIUM SERPL-SCNC: 3.7 MMOL/L (ref 3.6–5.5)
SODIUM SERPL-SCNC: 143 MMOL/L (ref 135–145)

## 2023-08-02 DIAGNOSIS — I10 ESSENTIAL HYPERTENSION: ICD-10-CM

## 2023-08-02 RX ORDER — LISINOPRIL AND HYDROCHLOROTHIAZIDE 12.5; 1 MG/1; MG/1
1 TABLET ORAL DAILY
Qty: 90 TABLET | Refills: 3 | Status: SHIPPED | OUTPATIENT
Start: 2023-08-02

## 2023-08-02 NOTE — TELEPHONE ENCOUNTER
Received request via: Pharmacy    Was the patient seen in the last year in this department? Yes    Does the patient have an active prescription (recently filled or refills available) for medication(s) requested? No    Does the patient have USP Plus and need 100 day supply (blood pressure, diabetes and cholesterol meds only)? Patient does not have SCP      Last office Visit:06/07/2023  Last Labs:07/26/2023

## 2023-09-07 ENCOUNTER — OFFICE VISIT (OUTPATIENT)
Dept: MEDICAL GROUP | Facility: PHYSICIAN GROUP | Age: 73
End: 2023-09-07
Payer: MEDICARE

## 2023-09-07 VITALS
WEIGHT: 189.6 LBS | OXYGEN SATURATION: 96 % | DIASTOLIC BLOOD PRESSURE: 80 MMHG | HEIGHT: 66 IN | BODY MASS INDEX: 30.47 KG/M2 | RESPIRATION RATE: 16 BRPM | HEART RATE: 74 BPM | TEMPERATURE: 97.8 F | SYSTOLIC BLOOD PRESSURE: 124 MMHG

## 2023-09-07 DIAGNOSIS — K21.9 GASTROESOPHAGEAL REFLUX DISEASE WITHOUT ESOPHAGITIS: ICD-10-CM

## 2023-09-07 DIAGNOSIS — E78.2 MIXED HYPERLIPIDEMIA: ICD-10-CM

## 2023-09-07 DIAGNOSIS — E55.9 VITAMIN D DEFICIENCY: ICD-10-CM

## 2023-09-07 DIAGNOSIS — K58.9 IRRITABLE BOWEL SYNDROME, UNSPECIFIED TYPE: ICD-10-CM

## 2023-09-07 DIAGNOSIS — R94.4 DECREASED GFR: ICD-10-CM

## 2023-09-07 PROCEDURE — 3074F SYST BP LT 130 MM HG: CPT | Performed by: FAMILY MEDICINE

## 2023-09-07 PROCEDURE — 3079F DIAST BP 80-89 MM HG: CPT | Performed by: FAMILY MEDICINE

## 2023-09-07 PROCEDURE — 99214 OFFICE O/P EST MOD 30 MIN: CPT | Performed by: FAMILY MEDICINE

## 2023-09-07 ASSESSMENT — FIBROSIS 4 INDEX: FIB4 SCORE: 1.22

## 2023-09-07 NOTE — ASSESSMENT & PLAN NOTE
Diarrhea predominant   Also significant bleeding hemorrhoids   Can use imodium once a day or twice a day regularly or as needed.

## 2023-09-07 NOTE — PROGRESS NOTES
Subjective:   Hanna Marcelo is a 73 y.o. female here today for evaluation and management of:     Decreased GFR  gfr back to normal  Encouraged to stay well hydrated.   Repeat labs ordered.   Continue lisinopril-hctz    IBS (irritable bowel syndrome)  Diarrhea predominant   Also significant bleeding hemorrhoids   Can use imodium once a day or twice a day regularly or as needed.                Current medicines (including changes today)  Current Outpatient Medications   Medication Sig Dispense Refill    lisinopril-hydrochlorothiazide (PRINZIDE) 10-12.5 MG per tablet Take 1 tablet by mouth once daily 90 Tablet 3    omeprazole (PRILOSEC) 20 MG delayed-release capsule Take 1 capsule by mouth twice daily 180 Capsule 3    ondansetron (ZOFRAN) 4 MG Tab tablet Take 1 Tablet by mouth every four hours as needed for Nausea/Vomiting. 20 Tablet 1    DULoxetine (CYMBALTA) 20 MG Cap DR Particles Take 1 Capsule by mouth every day. 90 Capsule 3    meloxicam (MOBIC) 7.5 MG Tab Take 1 Tablet by mouth every day. 90 Tablet 2    rosuvastatin (CRESTOR) 10 MG Tab TAKE 1 TABLET BY MOUTH ONCE DAILY IN THE EVENING 90 Tablet 3    Simethicone 180 MG Cap Take 1 Capsule by mouth every day.      cyanocobalamin (VITAMIN B-12) 100 MCG Tab Take 2,500 mcg by mouth every day.      acetaminophen (TYLENOL) 650 MG CR tablet Take 650 mg by mouth.      cyclobenzaprine (FLEXERIL) 10 mg Tab TAKE ONE TABLET BY MOUTH THREE TIMES DAILY AS NEEDED FOR SPASMS 270 Tablet 3    albuterol 108 (90 Base) MCG/ACT Aero Soln inhalation aerosol Inhale 2 Puffs every 6 hours as needed for Shortness of Breath. 8.5 g 3    Artificial Tear Ointment (DRY EYES OP) Place 1 Drop in both eyes 1 time daily as needed.      multivitamin (THERAGRAN) Tab Take 1 Tab by mouth every day.       No current facility-administered medications for this visit.     She  has a past medical history of Arthritis, Breath shortness, Bronchitis, CAD (coronary artery disease), Dental disorder (07/2018),  "DJD (degenerative joint disease), Fibromyalgia, GERD (gastroesophageal reflux disease), Heart burn, Heart murmur, Hepatitis B, Hiatus hernia syndrome, High cholesterol, Hyperlipidemia, Hypertension, IBD (inflammatory bowel disease), Indigestion, and Pain.    ROS  No chest pain, no shortness of breath, no abdominal pain       Objective:     /80   Pulse 74   Temp 36.6 °C (97.8 °F) (Temporal)   Resp 16   Ht 1.676 m (5' 6\")   Wt 86 kg (189 lb 9.6 oz)   SpO2 96%  Body mass index is 30.6 kg/m².   Physical Exam:  Constitutional: Alert, no distress.  Skin: Warm, dry, good turgor, no rashes in visible areas.  Eye: Equal, round and reactive, conjunctiva clear, lids normal.  ENMT: Lips without lesions, good dentition, oropharynx clear.  Neck: Trachea midline, no masses, no thyromegaly. No cervical or supraclavicular lymphadenopathy  Respiratory: Unlabored respiratory effort, lungs clear to auscultation, no wheezes, no ronchi.  Cardiovascular: Normal S1, S2, no murmur, no edema.  Abdomen: Soft, non-tender, no masses, no hepatosplenomegaly.  Psych: Alert and oriented x3, normal affect and mood.        Assessment and Plan:   The following treatment plan was discussed    1. Gastroesophageal reflux disease without esophagitis  - CBC WITH DIFFERENTIAL; Future  - Comp Metabolic Panel; Future    2. Mixed hyperlipidemia  - Lipid Profile; Future    3. Vitamin D deficiency  - VITAMIN D,25 HYDROXY (DEFICIENCY); Future    4. Decreased GFR    5. Irritable bowel syndrome, unspecified type      Followup: Return in about 3 months (around 12/7/2023) for Ibs diarrhea, nausea, stomach issues.           "

## 2023-09-07 NOTE — PATIENT INSTRUCTIONS
Please call GI for appt  [facility]  [address] [place] [id number]  [phone]    Please call  to schedule your follow up mammogram

## 2023-09-07 NOTE — ASSESSMENT & PLAN NOTE
gfr back to normal  Encouraged to stay well hydrated.   Repeat labs ordered.   Continue lisinopril-hctz

## 2023-09-29 ENCOUNTER — HOSPITAL ENCOUNTER (OUTPATIENT)
Dept: RADIOLOGY | Facility: MEDICAL CENTER | Age: 73
End: 2023-09-29
Attending: FAMILY MEDICINE
Payer: MEDICARE

## 2023-09-29 DIAGNOSIS — R92.8 ABNORMAL SCREENING MAMMOGRAM: ICD-10-CM

## 2023-09-29 PROCEDURE — G0279 TOMOSYNTHESIS, MAMMO: HCPCS

## 2024-03-29 NOTE — TELEPHONE ENCOUNTER
Requested Prescriptions     Pending Prescriptions Disp Refills    rosuvastatin (CRESTOR) 10 MG Tab 90 Tablet 3     Sig: Take 1 Tablet by mouth every evening.      Last office visit: 9/7/23  Last lab: 7/26/23

## 2024-04-02 RX ORDER — ROSUVASTATIN CALCIUM 10 MG/1
10 TABLET, COATED ORAL EVERY EVENING
Qty: 90 TABLET | Refills: 3 | Status: SHIPPED | OUTPATIENT
Start: 2024-04-02

## 2024-04-09 DIAGNOSIS — K21.9 GASTROESOPHAGEAL REFLUX DISEASE WITHOUT ESOPHAGITIS: ICD-10-CM

## 2024-04-09 RX ORDER — OMEPRAZOLE 20 MG/1
20 CAPSULE, DELAYED RELEASE ORAL 2 TIMES DAILY
Qty: 180 CAPSULE | Refills: 3 | Status: SHIPPED | OUTPATIENT
Start: 2024-04-09 | End: 2024-04-12 | Stop reason: SDUPTHER

## 2024-04-09 NOTE — TELEPHONE ENCOUNTER
Received request via: Patient    Was the patient seen in the last year in this department? Yes    Does the patient have an active prescription (recently filled or refills available) for medication(s) requested? No    Pharmacy Name: [facility]     Does the patient have [facility]PC Plus and need 100 day supply (blood pressure, diabetes and cholesterol meds only)? Patient does not have [facility]      [id number]  last OV

## 2024-04-12 DIAGNOSIS — K21.9 GASTROESOPHAGEAL REFLUX DISEASE WITHOUT ESOPHAGITIS: ICD-10-CM

## 2024-04-12 DIAGNOSIS — R06.02 SOB (SHORTNESS OF BREATH): ICD-10-CM

## 2024-04-12 RX ORDER — OMEPRAZOLE 20 MG/1
20 CAPSULE, DELAYED RELEASE ORAL DAILY
Qty: 90 CAPSULE | Refills: 3 | Status: SHIPPED | OUTPATIENT
Start: 2024-04-12

## 2024-04-16 NOTE — TELEPHONE ENCOUNTER
Last Ov 09 07 2023  Received request via: Patient    Was the patient seen in the last year in this department? Yes    Does the patient have an active prescription (recently filled or refills available) for medication(s) requested? No    Pharmacy Name: [facility]     Does the patient have FPC Plus and need 100 day supply (blood pressure, diabetes and cholesterol meds only)? Patient does not have SCP

## 2024-04-17 RX ORDER — ALBUTEROL SULFATE 90 UG/1
2 AEROSOL, METERED RESPIRATORY (INHALATION) EVERY 6 HOURS PRN
Qty: 8.5 G | Refills: 3 | Status: SHIPPED | OUTPATIENT
Start: 2024-04-17

## 2024-05-08 ENCOUNTER — HOSPITAL ENCOUNTER (OUTPATIENT)
Dept: LAB | Facility: MEDICAL CENTER | Age: 74
End: 2024-05-08
Attending: FAMILY MEDICINE
Payer: MEDICARE

## 2024-05-08 DIAGNOSIS — E55.9 VITAMIN D DEFICIENCY: ICD-10-CM

## 2024-05-08 DIAGNOSIS — K21.9 GASTROESOPHAGEAL REFLUX DISEASE WITHOUT ESOPHAGITIS: ICD-10-CM

## 2024-05-08 DIAGNOSIS — E78.2 MIXED HYPERLIPIDEMIA: ICD-10-CM

## 2024-05-08 LAB
BASOPHILS # BLD AUTO: 0.7 % (ref 0–1.8)
BASOPHILS # BLD: 0.05 K/UL (ref 0–0.12)
EOSINOPHIL # BLD AUTO: 0.24 K/UL (ref 0–0.51)
EOSINOPHIL NFR BLD: 3.3 % (ref 0–6.9)
ERYTHROCYTE [DISTWIDTH] IN BLOOD BY AUTOMATED COUNT: 46 FL (ref 35.9–50)
HCT VFR BLD AUTO: 44.3 % (ref 37–47)
HGB BLD-MCNC: 14.9 G/DL (ref 12–16)
IMM GRANULOCYTES # BLD AUTO: 0.01 K/UL (ref 0–0.11)
IMM GRANULOCYTES NFR BLD AUTO: 0.1 % (ref 0–0.9)
LYMPHOCYTES # BLD AUTO: 2.64 K/UL (ref 1–4.8)
LYMPHOCYTES NFR BLD: 36.5 % (ref 22–41)
MCH RBC QN AUTO: 30.4 PG (ref 27–33)
MCHC RBC AUTO-ENTMCNC: 33.6 G/DL (ref 32.2–35.5)
MCV RBC AUTO: 90.4 FL (ref 81.4–97.8)
MONOCYTES # BLD AUTO: 0.58 K/UL (ref 0–0.85)
MONOCYTES NFR BLD AUTO: 8 % (ref 0–13.4)
NEUTROPHILS # BLD AUTO: 3.71 K/UL (ref 1.82–7.42)
NEUTROPHILS NFR BLD: 51.4 % (ref 44–72)
NRBC # BLD AUTO: 0 K/UL
NRBC BLD-RTO: 0 /100 WBC (ref 0–0.2)
PLATELET # BLD AUTO: 339 K/UL (ref 164–446)
PMV BLD AUTO: 9.7 FL (ref 9–12.9)
RBC # BLD AUTO: 4.9 M/UL (ref 4.2–5.4)
WBC # BLD AUTO: 7.2 K/UL (ref 4.8–10.8)

## 2024-05-09 LAB
25(OH)D3 SERPL-MCNC: 42 NG/ML (ref 30–100)
ALBUMIN SERPL BCP-MCNC: 4.5 G/DL (ref 3.2–4.9)
ALBUMIN/GLOB SERPL: 1.7 G/DL
ALP SERPL-CCNC: 99 U/L (ref 30–99)
ALT SERPL-CCNC: 19 U/L (ref 2–50)
ANION GAP SERPL CALC-SCNC: 15 MMOL/L (ref 7–16)
AST SERPL-CCNC: 28 U/L (ref 12–45)
BILIRUB SERPL-MCNC: 1.2 MG/DL (ref 0.1–1.5)
BUN SERPL-MCNC: 15 MG/DL (ref 8–22)
CALCIUM ALBUM COR SERPL-MCNC: 9.3 MG/DL (ref 8.5–10.5)
CALCIUM SERPL-MCNC: 9.7 MG/DL (ref 8.5–10.5)
CHLORIDE SERPL-SCNC: 106 MMOL/L (ref 96–112)
CHOLEST SERPL-MCNC: 210 MG/DL (ref 100–199)
CO2 SERPL-SCNC: 20 MMOL/L (ref 20–33)
CREAT SERPL-MCNC: 0.82 MG/DL (ref 0.5–1.4)
FASTING STATUS PATIENT QL REPORTED: NORMAL
GFR SERPLBLD CREATININE-BSD FMLA CKD-EPI: 76 ML/MIN/1.73 M 2
GLOBULIN SER CALC-MCNC: 2.7 G/DL (ref 1.9–3.5)
GLUCOSE SERPL-MCNC: 100 MG/DL (ref 65–99)
HDLC SERPL-MCNC: 57 MG/DL
LDLC SERPL CALC-MCNC: 111 MG/DL
POTASSIUM SERPL-SCNC: 4.1 MMOL/L (ref 3.6–5.5)
PROT SERPL-MCNC: 7.2 G/DL (ref 6–8.2)
SODIUM SERPL-SCNC: 141 MMOL/L (ref 135–145)
TRIGL SERPL-MCNC: 208 MG/DL (ref 0–149)

## 2024-05-15 ENCOUNTER — APPOINTMENT (OUTPATIENT)
Dept: MEDICAL GROUP | Facility: PHYSICIAN GROUP | Age: 74
End: 2024-05-15
Payer: MEDICARE

## 2024-05-15 ENCOUNTER — HOSPITAL ENCOUNTER (OUTPATIENT)
Facility: MEDICAL CENTER | Age: 74
End: 2024-05-15
Attending: FAMILY MEDICINE
Payer: MEDICARE

## 2024-05-15 ENCOUNTER — TELEPHONE (OUTPATIENT)
Dept: MEDICAL GROUP | Facility: PHYSICIAN GROUP | Age: 74
End: 2024-05-15

## 2024-05-15 VITALS
OXYGEN SATURATION: 96 % | DIASTOLIC BLOOD PRESSURE: 70 MMHG | TEMPERATURE: 97.8 F | HEART RATE: 84 BPM | WEIGHT: 182 LBS | BODY MASS INDEX: 29.25 KG/M2 | SYSTOLIC BLOOD PRESSURE: 120 MMHG | RESPIRATION RATE: 16 BRPM | HEIGHT: 66 IN

## 2024-05-15 DIAGNOSIS — R10.11 RIGHT UPPER QUADRANT ABDOMINAL PAIN: ICD-10-CM

## 2024-05-15 DIAGNOSIS — H53.9 VISUAL CHANGES: ICD-10-CM

## 2024-05-15 DIAGNOSIS — G43.109 OCULAR MIGRAINE: ICD-10-CM

## 2024-05-15 DIAGNOSIS — M15.9 OSTEOARTHRITIS OF MULTIPLE JOINTS, UNSPECIFIED OSTEOARTHRITIS TYPE: ICD-10-CM

## 2024-05-15 DIAGNOSIS — H53.9 VISION CHANGES: ICD-10-CM

## 2024-05-15 DIAGNOSIS — M65.331 TRIGGER MIDDLE FINGER OF RIGHT HAND: ICD-10-CM

## 2024-05-15 LAB
APPEARANCE UR: CLEAR
BACTERIA #/AREA URNS HPF: ABNORMAL /HPF
BILIRUB UR QL STRIP.AUTO: NEGATIVE
COLOR UR: ABNORMAL
EPI CELLS #/AREA URNS HPF: NEGATIVE /HPF
GLUCOSE UR STRIP.AUTO-MCNC: NEGATIVE MG/DL
HYALINE CASTS #/AREA URNS LPF: ABNORMAL /LPF
KETONES UR STRIP.AUTO-MCNC: NEGATIVE MG/DL
LEUKOCYTE ESTERASE UR QL STRIP.AUTO: ABNORMAL
MICRO URNS: ABNORMAL
NITRITE UR QL STRIP.AUTO: POSITIVE
PH UR STRIP.AUTO: 5.5 [PH] (ref 5–8)
PROT UR QL STRIP: NEGATIVE MG/DL
RBC # URNS HPF: ABNORMAL /HPF
RBC UR QL AUTO: ABNORMAL
SP GR UR STRIP.AUTO: 1.02
UROBILINOGEN UR STRIP.AUTO-MCNC: 0.2 MG/DL
WBC #/AREA URNS HPF: ABNORMAL /HPF

## 2024-05-15 PROCEDURE — 3078F DIAST BP <80 MM HG: CPT | Performed by: FAMILY MEDICINE

## 2024-05-15 PROCEDURE — 99214 OFFICE O/P EST MOD 30 MIN: CPT | Performed by: FAMILY MEDICINE

## 2024-05-15 PROCEDURE — 3074F SYST BP LT 130 MM HG: CPT | Performed by: FAMILY MEDICINE

## 2024-05-15 RX ORDER — LIDOCAINE HYDROCHLORIDE 20 MG/ML
INJECTION, SOLUTION INFILTRATION; PERINEURAL
COMMUNITY
End: 2024-05-15

## 2024-05-15 RX ORDER — TRIAMCINOLONE ACETONIDE 40 MG/ML
20 INJECTION, SUSPENSION INTRA-ARTICULAR; INTRAMUSCULAR ONCE
Status: DISCONTINUED | OUTPATIENT
Start: 2024-05-15 | End: 2024-05-15

## 2024-05-15 RX ORDER — DEXAMETHASONE SODIUM PHOSPHATE 10 MG/ML
INJECTION INTRAMUSCULAR; INTRAVENOUS
COMMUNITY
End: 2024-05-15

## 2024-05-15 RX ORDER — CEPHALEXIN 500 MG/1
CAPSULE ORAL
COMMUNITY
End: 2024-05-15

## 2024-05-15 RX ORDER — DEXAMETHASONE SODIUM PHOSPHATE 4 MG/ML
INJECTION, SOLUTION INTRA-ARTICULAR; INTRALESIONAL; INTRAMUSCULAR; INTRAVENOUS; SOFT TISSUE
COMMUNITY
End: 2024-05-15

## 2024-05-15 RX ORDER — PREGABALIN 50 MG/1
CAPSULE ORAL
COMMUNITY
End: 2024-05-15

## 2024-05-15 RX ORDER — HYDROCODONE BITARTRATE AND ACETAMINOPHEN 7.5; 325 MG/1; MG/1
TABLET ORAL
COMMUNITY

## 2024-05-15 RX ORDER — OXYCODONE HCL 5 MG/5 ML
SOLUTION, ORAL ORAL
COMMUNITY
End: 2024-05-15

## 2024-05-15 RX ORDER — HYDROCODONE BITARTRATE AND ACETAMINOPHEN 10; 325 MG/1; MG/1
TABLET ORAL
COMMUNITY

## 2024-05-15 RX ORDER — SODIUM CHLORIDE, SODIUM LACTATE, POTASSIUM CHLORIDE, AND CALCIUM CHLORIDE .6; .31; .03; .02 G/100ML; G/100ML; G/100ML; G/100ML
IRRIGANT IRRIGATION
COMMUNITY
End: 2024-05-15

## 2024-05-15 RX ORDER — SODIUM CHLORIDE FOR INHALATION 0.9 %
VIAL, NEBULIZER (ML) INHALATION
COMMUNITY
End: 2024-05-15

## 2024-05-15 RX ORDER — DEXTROSE MONOHYDRATE 50 MG/ML
INJECTION, SOLUTION INTRAVENOUS
COMMUNITY
End: 2024-05-15

## 2024-05-15 RX ORDER — BUPIVACAINE HYDROCHLORIDE 5 MG/ML
INJECTION, SOLUTION EPIDURAL; INTRACAUDAL
COMMUNITY
End: 2024-05-15

## 2024-05-15 ASSESSMENT — FIBROSIS 4 INDEX: FIB4 SCORE: 1.36

## 2024-05-15 ASSESSMENT — PATIENT HEALTH QUESTIONNAIRE - PHQ9: CLINICAL INTERPRETATION OF PHQ2 SCORE: 0

## 2024-05-15 NOTE — PROGRESS NOTES
Subjective:   Hanna Marcelo is a 74 y.o. female here today for evaluation and management of:     Right upper quadrant abdominal pain  Patient has a few days of right upper quadrant pain  Hx of cholecystectomy, hx of hep B many years ago  Has IBS uncontrolled and has thoracic back pain with radiculopathy.   Will check UA as has normal lfts   Pain improved in last two days.     Visual changes  A few weeks ago, she had an aura with a colorful arc of light then sharp pain in left eye and then a large floater in left eye.   Ref to ophthalmology provided.       Trigger middle finger of right hand  Locking and pain of right middle finger  She has to twist and pull it to release it when it locks  She had an injection of it twice in the past, has a finger splint to use at night.   Sometimes has her neighbor to pull her finger to unlock it.     Last trigger finger injection was Feb 2023  Will call her when kenalog available for injection in next 2-3 weeks.          Current medicines (including changes today)  Current Outpatient Medications   Medication Sig Dispense Refill    HYDROcodone/acetaminophen (NORCO)  MG Tab       albuterol 108 (90 Base) MCG/ACT Aero Soln inhalation aerosol Inhale 2 Puffs every 6 hours as needed for Shortness of Breath. 8.5 g 3    omeprazole (PRILOSEC) 20 MG delayed-release capsule Take 1 Capsule by mouth every day. 90 Capsule 3    rosuvastatin (CRESTOR) 10 MG Tab Take 1 Tablet by mouth every evening. 90 Tablet 3    lisinopril-hydrochlorothiazide (PRINZIDE) 10-12.5 MG per tablet Take 1 tablet by mouth once daily 90 Tablet 3    ondansetron (ZOFRAN) 4 MG Tab tablet Take 1 Tablet by mouth every four hours as needed for Nausea/Vomiting. 20 Tablet 1    DULoxetine (CYMBALTA) 20 MG Cap DR Particles Take 1 Capsule by mouth every day. 90 Capsule 3    meloxicam (MOBIC) 7.5 MG Tab Take 1 Tablet by mouth every day. 90 Tablet 2    Simethicone 180 MG Cap Take 1 Capsule by mouth every day.       "cyanocobalamin (VITAMIN B-12) 100 MCG Tab Take 2,500 mcg by mouth every day.      acetaminophen (TYLENOL) 650 MG CR tablet Take 650 mg by mouth.      cyclobenzaprine (FLEXERIL) 10 mg Tab TAKE ONE TABLET BY MOUTH THREE TIMES DAILY AS NEEDED FOR SPASMS 270 Tablet 3    Artificial Tear Ointment (DRY EYES OP) Place 1 Drop in both eyes 1 time daily as needed.      multivitamin (THERAGRAN) Tab Take 1 Tab by mouth every day.      ceFAZolin (ANCEF) 2 g/10mL  (Patient not taking: Reported on 5/15/2024)      HYDROcodone-acetaminophen (NORCO) 7.5-325 MG tab TAKE 1 TABLET BY MOUTH EVERY 6 HOURS AS NEEDED FOR SEVERE PAIN. DO NOT DRIVE OR USE ALCOHOL AFTER TAKING      dextrose 5% 5 % Solution  (Patient not taking: Reported on 5/15/2024)      dexamethasone (DECADRON) 4 MG/ML Solution  (Patient not taking: Reported on 5/15/2024)      cephALEXin (KEFLEX) 500 MG Cap  (Patient not taking: Reported on 5/15/2024)      bupivacaine, pf, (MARCAINE/SENSORCAINE) 0.5 % Solution  (Patient not taking: Reported on 5/15/2024)      dextrose 5% 5 % SOLN 50 mL with rocuronium 50 MG/5ML SOLN 250 mg  (Patient not taking: Reported on 5/15/2024)      lactated ringers irrigation solution  (Patient not taking: Reported on 5/15/2024)      dexamethasone (DECADRON) 10 MG/ML Solution  (Patient not taking: Reported on 5/15/2024)       No current facility-administered medications for this visit.     She  has a past medical history of Arthritis, Breath shortness, Bronchitis, CAD (coronary artery disease), Dental disorder (07/2018), DJD (degenerative joint disease), Fibromyalgia, GERD (gastroesophageal reflux disease), Heart burn, Heart murmur, Hepatitis B, Hiatus hernia syndrome, High cholesterol, Hyperlipidemia, Hypertension, IBD (inflammatory bowel disease), Indigestion, and Pain.    ROS  No chest pain, no shortness of breath, no abdominal pain       Objective:     /70   Pulse 84   Temp 36.6 °C (97.8 °F) (Temporal)   Resp 16   Ht 1.664 m (5' 5.5\")   " Wt 82.6 kg (182 lb)   SpO2 96%  Body mass index is 29.83 kg/m².   Physical Exam:  Constitutional: Alert, no distress.  Skin: Warm, dry, good turgor, no rashes in visible areas.  Eye: Equal, round and reactive, conjunctiva clear, lids normal.  ENMT: Lips without lesions, good dentition, oropharynx clear.  Neck: Trachea midline, no masses, no thyromegaly. No cervical or supraclavicular lymphadenopathy  Respiratory: Unlabored respiratory effort, lungs clear to auscultation, no wheezes, no ronchi.  Cardiovascular: Normal S1, S2, no murmur, no edema.  Abdomen: Soft, non-tender, no masses, no hepatosplenomegaly.  Psych: Alert and oriented x3, normal affect and mood.    Assessment and Plan:   The following treatment plan was discussed    1. Right upper quadrant abdominal pain  - URINALYSIS,CULTURE IF INDICATED; Future    2. Vision changes  - Referral to Ophthalmology    3. Ocular migraine    4. Visual changes  - Referral to Ophthalmology    5. Trigger middle finger of right hand    Other orders  - ceFAZolin (ANCEF) 2 g/10mL;  (Patient not taking: Reported on 5/15/2024)  - HYDROcodone-acetaminophen (NORCO) 7.5-325 MG tab; TAKE 1 TABLET BY MOUTH EVERY 6 HOURS AS NEEDED FOR SEVERE PAIN. DO NOT DRIVE OR USE ALCOHOL AFTER TAKING  - HYDROcodone/acetaminophen (NORCO)  MG Tab  - dextrose 5% 5 % Solution;  (Patient not taking: Reported on 5/15/2024)  - dexamethasone (DECADRON) 4 MG/ML Solution;  (Patient not taking: Reported on 5/15/2024)  - cephALEXin (KEFLEX) 500 MG Cap;  (Patient not taking: Reported on 5/15/2024)  - bupivacaine, pf, (MARCAINE/SENSORCAINE) 0.5 % Solution;  (Patient not taking: Reported on 5/15/2024)  - dextrose 5% 5 % SOLN 50 mL with rocuronium 50 MG/5ML SOLN 250 mg;  (Patient not taking: Reported on 5/15/2024)  - lactated ringers irrigation solution;  (Patient not taking: Reported on 5/15/2024)  - dexamethasone (DECADRON) 10 MG/ML Solution;  (Patient not taking: Reported on 5/15/2024)      Followup: No  follow-ups on file.

## 2024-05-15 NOTE — ASSESSMENT & PLAN NOTE
A few weeks ago, she had an aura with a colorful arc of light then sharp pain in left eye and then a large floater in left eye.   Ref to ophthalmology provided.

## 2024-05-15 NOTE — ASSESSMENT & PLAN NOTE
Locking and pain of right middle finger  She has to twist and pull it to release it when it locks  She had an injection of it twice in the past, has a finger splint to use at night.   Sometimes has her neighbor to pull her finger to unlock it.     Last trigger finger injection was Feb 2023  Will call her when kenalog available for injection in next 2-3 weeks.

## 2024-05-15 NOTE — ASSESSMENT & PLAN NOTE
Patient has a few days of right upper quadrant pain  Hx of cholecystectomy, hx of hep B many years ago  Has IBS uncontrolled and has thoracic back pain with radiculopathy.   Will check UA as has normal lfts   Pain improved in last two days.

## 2024-05-16 RX ORDER — MELOXICAM 7.5 MG/1
7.5 TABLET ORAL DAILY
Qty: 90 TABLET | Refills: 3 | Status: SHIPPED | OUTPATIENT
Start: 2024-05-16

## 2024-05-16 NOTE — TELEPHONE ENCOUNTER
Requested Prescriptions     Pending Prescriptions Disp Refills    meloxicam (MOBIC) 7.5 MG Tab [Pharmacy Med Name: Meloxicam 7.5 MG Oral Tablet] 90 Tablet 0     Sig: Take 1 tablet by mouth once daily      Last office visit: 5/15/24  Last lab: 5/8/24

## 2024-05-23 ENCOUNTER — OFFICE VISIT (OUTPATIENT)
Dept: MEDICAL GROUP | Facility: PHYSICIAN GROUP | Age: 74
End: 2024-05-23
Payer: MEDICARE

## 2024-05-23 VITALS
BODY MASS INDEX: 30.37 KG/M2 | SYSTOLIC BLOOD PRESSURE: 120 MMHG | WEIGHT: 189 LBS | DIASTOLIC BLOOD PRESSURE: 80 MMHG | OXYGEN SATURATION: 97 % | HEART RATE: 85 BPM | TEMPERATURE: 97.4 F | RESPIRATION RATE: 16 BRPM | HEIGHT: 66 IN

## 2024-05-23 DIAGNOSIS — M65.331 TRIGGER MIDDLE FINGER OF RIGHT HAND: ICD-10-CM

## 2024-05-23 DIAGNOSIS — E78.2 MIXED HYPERLIPIDEMIA: ICD-10-CM

## 2024-05-23 DIAGNOSIS — R73.01 ELEVATED FASTING GLUCOSE: ICD-10-CM

## 2024-05-23 DIAGNOSIS — N30.00 ACUTE CYSTITIS WITHOUT HEMATURIA: ICD-10-CM

## 2024-05-23 PROBLEM — K92.1: Status: RESOLVED | Noted: 2021-01-07 | Resolved: 2024-05-23

## 2024-05-23 PROCEDURE — 3074F SYST BP LT 130 MM HG: CPT | Performed by: FAMILY MEDICINE

## 2024-05-23 PROCEDURE — 99214 OFFICE O/P EST MOD 30 MIN: CPT | Mod: 25 | Performed by: FAMILY MEDICINE

## 2024-05-23 PROCEDURE — 20550 NJX 1 TENDON SHEATH/LIGAMENT: CPT | Mod: F7 | Performed by: FAMILY MEDICINE

## 2024-05-23 PROCEDURE — 3079F DIAST BP 80-89 MM HG: CPT | Performed by: FAMILY MEDICINE

## 2024-05-23 RX ORDER — NITROFURANTOIN 25; 75 MG/1; MG/1
100 CAPSULE ORAL 2 TIMES DAILY
Qty: 10 CAPSULE | Refills: 0 | Status: SHIPPED | OUTPATIENT
Start: 2024-05-23 | End: 2024-05-28

## 2024-05-23 RX ORDER — MELOXICAM 7.5 MG/1
1 TABLET ORAL DAILY
COMMUNITY
End: 2024-05-23

## 2024-05-23 RX ORDER — TRIAMCINOLONE ACETONIDE 40 MG/ML
40 INJECTION, SUSPENSION INTRA-ARTICULAR; INTRAMUSCULAR ONCE
Status: COMPLETED | OUTPATIENT
Start: 2024-05-23 | End: 2024-05-23

## 2024-05-23 RX ORDER — CYCLOBENZAPRINE HCL 10 MG
1 TABLET ORAL 3 TIMES DAILY PRN
COMMUNITY
End: 2024-05-23

## 2024-05-23 RX ADMIN — Medication 1 ML: at 10:59

## 2024-05-23 RX ADMIN — TRIAMCINOLONE ACETONIDE 40 MG: 40 INJECTION, SUSPENSION INTRA-ARTICULAR; INTRAMUSCULAR at 11:00

## 2024-05-23 ASSESSMENT — FIBROSIS 4 INDEX: FIB4 SCORE: 1.36

## 2024-05-23 NOTE — ASSESSMENT & PLAN NOTE
Had UA with bacteria and nitrites trace blood. Was checked as she had abdominal pain.   Rx for nitrofurantoin provided.   She has no dysuria, flank pain, fever/chills.

## 2024-05-23 NOTE — PROGRESS NOTES
Subjective:   Hanna Marcelo is a 74 y.o. female here today for evaluation and management of:     Trigger middle finger of right hand  Locking and pain of right middle finger  She has to twist and pull it to release it when it locks  She had an injection of it twice in the past, has a finger splint to use at night.   Sometimes has her neighbor to pull her finger to unlock it.     Last trigger finger injection was Feb 2023  Skin cleaned with alcohol wipes, 1% lidocaine without epinephrine 1 cc and 1 cc of 40 mg/cc kenalog mixture injected into palmar aspect along middle finger tendon after aspiration with no blood and no resistance with injection.     Acute cystitis without hematuria  Had UA with bacteria and nitrites trace blood. Was checked as she had abdominal pain.   Rx for nitrofurantoin provided.   She has no dysuria, flank pain, fever/chills.          Current medicines (including changes today)  Current Outpatient Medications   Medication Sig Dispense Refill    nitrofurantoin (MACROBID) 100 MG Cap Take 1 Capsule by mouth 2 times a day for 5 days. 10 Capsule 0    meloxicam (MOBIC) 7.5 MG Tab Take 1 tablet by mouth once daily 90 Tablet 3    HYDROcodone-acetaminophen (NORCO) 7.5-325 MG tab TAKE 1 TABLET BY MOUTH EVERY 6 HOURS AS NEEDED FOR SEVERE PAIN. DO NOT DRIVE OR USE ALCOHOL AFTER TAKING      HYDROcodone/acetaminophen (NORCO)  MG Tab       albuterol 108 (90 Base) MCG/ACT Aero Soln inhalation aerosol Inhale 2 Puffs every 6 hours as needed for Shortness of Breath. 8.5 g 3    omeprazole (PRILOSEC) 20 MG delayed-release capsule Take 1 Capsule by mouth every day. 90 Capsule 3    rosuvastatin (CRESTOR) 10 MG Tab Take 1 Tablet by mouth every evening. 90 Tablet 3    lisinopril-hydrochlorothiazide (PRINZIDE) 10-12.5 MG per tablet Take 1 tablet by mouth once daily 90 Tablet 3    ondansetron (ZOFRAN) 4 MG Tab tablet Take 1 Tablet by mouth every four hours as needed for Nausea/Vomiting. 20 Tablet 1     "DULoxetine (CYMBALTA) 20 MG Cap DR Particles Take 1 Capsule by mouth every day. 90 Capsule 3    Simethicone 180 MG Cap Take 1 Capsule by mouth every day.      cyanocobalamin (VITAMIN B-12) 100 MCG Tab Take 2,500 mcg by mouth every day.      acetaminophen (TYLENOL) 650 MG CR tablet Take 650 mg by mouth.      cyclobenzaprine (FLEXERIL) 10 mg Tab TAKE ONE TABLET BY MOUTH THREE TIMES DAILY AS NEEDED FOR SPASMS 270 Tablet 3    Artificial Tear Ointment (DRY EYES OP) Place 1 Drop in both eyes 1 time daily as needed.      multivitamin (THERAGRAN) Tab Take 1 Tab by mouth every day.       No current facility-administered medications for this visit.     She  has a past medical history of Arthritis, Breath shortness, Bronchitis, CAD (coronary artery disease), Dental disorder (07/2018), DJD (degenerative joint disease), Fibromyalgia, GERD (gastroesophageal reflux disease), Heart burn, Heart murmur, Hepatitis B, Hiatus hernia syndrome, High cholesterol, Hyperlipidemia, Hypertension, IBD (inflammatory bowel disease), Indigestion, and Pain.    ROS  No chest pain, no shortness of breath, no abdominal pain       Objective:     /80   Pulse 85   Temp 36.3 °C (97.4 °F) (Temporal)   Resp 16   Ht 1.664 m (5' 5.5\")   Wt 85.7 kg (189 lb)   SpO2 97%  Body mass index is 30.97 kg/m².   Physical Exam:  Constitutional: Alert, no distress.  Skin: Warm, dry, good turgor, no rashes in visible areas.  Eye: Equal, round and reactive, conjunctiva clear, lids normal.  ENMT: Lips without lesions, good dentition, oropharynx clear.  Neck: Trachea midline, no masses, no thyromegaly. No cervical or supraclavicular lymphadenopathy  Respiratory: Unlabored respiratory effort, lungs clear to auscultation, no wheezes, no ronchi.  Cardiovascular: Normal S1, S2, no murmur, no edema.  Abdomen: Soft, non-tender, no masses, no hepatosplenomegaly.  Psych: Alert and oriented x3, normal affect and mood.    Assessment and Plan:   The following treatment plan " was discussed    1. Trigger middle finger of right hand    2. Elevated fasting glucose  - Comp Metabolic Panel; Future  - HEMOGLOBIN A1C; Future    3. Mixed hyperlipidemia  - Comp Metabolic Panel; Future  - Lipid Profile; Future    4. Acute cystitis without hematuria    Other orders  - lidocaine (Xylocaine) 1 % injection 1 mL  - triamcinolone acetonide (Kenalog-40) injection 40 mg  - nitrofurantoin (MACROBID) 100 MG Cap; Take 1 Capsule by mouth 2 times a day for 5 days.  Dispense: 10 Capsule; Refill: 0      Followup: Return in about 6 months (around 11/23/2024) for Lab Review.

## 2024-05-23 NOTE — ASSESSMENT & PLAN NOTE
Locking and pain of right middle finger  She has to twist and pull it to release it when it locks  She had an injection of it twice in the past, has a finger splint to use at night.   Sometimes has her neighbor to pull her finger to unlock it.     Last trigger finger injection was Feb 2023  Skin cleaned with alcohol wipes, 1% lidocaine without epinephrine 1 cc and 1 cc of 40 mg/cc kenalog mixture injected into palmar aspect along middle finger tendon after aspiration with no blood and no resistance with injection.

## 2024-06-06 RX ORDER — DULOXETIN HYDROCHLORIDE 20 MG/1
20 CAPSULE, DELAYED RELEASE ORAL DAILY
Qty: 90 CAPSULE | Refills: 3 | Status: SHIPPED | OUTPATIENT
Start: 2024-06-06

## 2024-06-06 NOTE — TELEPHONE ENCOUNTER
Requested Prescriptions     Pending Prescriptions Disp Refills    DULoxetine (CYMBALTA) 20 MG Cap DR Particles 90 Capsule 3     Sig: Take 1 Capsule by mouth every day.      Last office visit: 5/23/24  Last lab: 5/8/24

## 2024-08-10 DIAGNOSIS — I10 ESSENTIAL HYPERTENSION: ICD-10-CM

## 2024-08-13 NOTE — TELEPHONE ENCOUNTER
[id number]  last Ov      Received request via: Pharmacy    Was the patient seen in the last year in this department? Yes    Does the patient have an active prescription (recently filled or refills available) for medication(s) requested? No    Pharmacy Name: [facility]     Does the patient have alf Plus and need 100-day supply? (This applies to ALL medications) Patient does not have SCP

## 2024-08-14 RX ORDER — LISINOPRIL/HYDROCHLOROTHIAZIDE 10-12.5 MG
1 TABLET ORAL DAILY
Qty: 90 TABLET | Refills: 3 | Status: SHIPPED | OUTPATIENT
Start: 2024-08-14

## 2024-11-12 ENCOUNTER — HOSPITAL ENCOUNTER (OUTPATIENT)
Dept: LAB | Facility: MEDICAL CENTER | Age: 74
End: 2024-11-12
Attending: FAMILY MEDICINE
Payer: MEDICARE

## 2024-11-12 DIAGNOSIS — R73.01 ELEVATED FASTING GLUCOSE: ICD-10-CM

## 2024-11-12 DIAGNOSIS — E78.2 MIXED HYPERLIPIDEMIA: ICD-10-CM

## 2024-11-12 LAB
EST. AVERAGE GLUCOSE BLD GHB EST-MCNC: 114 MG/DL
HBA1C MFR BLD: 5.6 % (ref 4–5.6)

## 2024-11-12 PROCEDURE — 83036 HEMOGLOBIN GLYCOSYLATED A1C: CPT | Mod: GA

## 2024-11-12 PROCEDURE — 80061 LIPID PANEL: CPT

## 2024-11-12 PROCEDURE — 80053 COMPREHEN METABOLIC PANEL: CPT

## 2024-11-12 PROCEDURE — 36415 COLL VENOUS BLD VENIPUNCTURE: CPT | Mod: GA

## 2024-11-13 LAB
ALBUMIN SERPL BCP-MCNC: 4.2 G/DL (ref 3.2–4.9)
ALBUMIN/GLOB SERPL: 1.4 G/DL
ALP SERPL-CCNC: 97 U/L (ref 30–99)
ALT SERPL-CCNC: 18 U/L (ref 2–50)
ANION GAP SERPL CALC-SCNC: 12 MMOL/L (ref 7–16)
AST SERPL-CCNC: 27 U/L (ref 12–45)
BILIRUB SERPL-MCNC: 1 MG/DL (ref 0.1–1.5)
BUN SERPL-MCNC: 17 MG/DL (ref 8–22)
CALCIUM ALBUM COR SERPL-MCNC: 9.4 MG/DL (ref 8.5–10.5)
CALCIUM SERPL-MCNC: 9.6 MG/DL (ref 8.5–10.5)
CHLORIDE SERPL-SCNC: 106 MMOL/L (ref 96–112)
CHOLEST SERPL-MCNC: 202 MG/DL (ref 100–199)
CO2 SERPL-SCNC: 22 MMOL/L (ref 20–33)
CREAT SERPL-MCNC: 0.83 MG/DL (ref 0.5–1.4)
FASTING STATUS PATIENT QL REPORTED: NORMAL
GFR SERPLBLD CREATININE-BSD FMLA CKD-EPI: 75 ML/MIN/1.73 M 2
GLOBULIN SER CALC-MCNC: 2.9 G/DL (ref 1.9–3.5)
GLUCOSE SERPL-MCNC: 98 MG/DL (ref 65–99)
HDLC SERPL-MCNC: 52 MG/DL
LDLC SERPL CALC-MCNC: 111 MG/DL
POTASSIUM SERPL-SCNC: 3.8 MMOL/L (ref 3.6–5.5)
PROT SERPL-MCNC: 7.1 G/DL (ref 6–8.2)
SODIUM SERPL-SCNC: 140 MMOL/L (ref 135–145)
TRIGL SERPL-MCNC: 197 MG/DL (ref 0–149)

## 2024-11-15 ENCOUNTER — OFFICE VISIT (OUTPATIENT)
Dept: MEDICAL GROUP | Facility: PHYSICIAN GROUP | Age: 74
End: 2024-11-15
Payer: MEDICARE

## 2024-11-15 VITALS
BODY MASS INDEX: 30.7 KG/M2 | RESPIRATION RATE: 17 BRPM | WEIGHT: 191 LBS | TEMPERATURE: 97.5 F | OXYGEN SATURATION: 99 % | DIASTOLIC BLOOD PRESSURE: 84 MMHG | HEIGHT: 66 IN | HEART RATE: 81 BPM | SYSTOLIC BLOOD PRESSURE: 138 MMHG

## 2024-11-15 DIAGNOSIS — G57.93 NEUROPATHY INVOLVING BOTH LOWER EXTREMITIES: ICD-10-CM

## 2024-11-15 DIAGNOSIS — R10.84 GENERALIZED ABDOMINAL PAIN: ICD-10-CM

## 2024-11-15 DIAGNOSIS — J31.0 CHRONIC RHINITIS: ICD-10-CM

## 2024-11-15 DIAGNOSIS — Z79.891 CHRONIC USE OF OPIATE FOR THERAPEUTIC PURPOSE: ICD-10-CM

## 2024-11-15 DIAGNOSIS — M54.16 LUMBAR RADICULOPATHY: ICD-10-CM

## 2024-11-15 DIAGNOSIS — K64.9 HEMORRHOIDS, UNSPECIFIED HEMORRHOID TYPE: ICD-10-CM

## 2024-11-15 DIAGNOSIS — Z23 NEED FOR VACCINATION: ICD-10-CM

## 2024-11-15 DIAGNOSIS — F41.9 ANXIETY: ICD-10-CM

## 2024-11-15 PROCEDURE — 99214 OFFICE O/P EST MOD 30 MIN: CPT | Mod: 25 | Performed by: FAMILY MEDICINE

## 2024-11-15 PROCEDURE — 3075F SYST BP GE 130 - 139MM HG: CPT | Performed by: FAMILY MEDICINE

## 2024-11-15 PROCEDURE — 3079F DIAST BP 80-89 MM HG: CPT | Performed by: FAMILY MEDICINE

## 2024-11-15 RX ORDER — AZELASTINE 1 MG/ML
1 SPRAY, METERED NASAL 2 TIMES DAILY
Qty: 30 ML | Refills: 3 | Status: SHIPPED | OUTPATIENT
Start: 2024-11-15

## 2024-11-15 RX ORDER — DEXAMETHASONE SODIUM PHOSPHATE 10 MG/ML
INJECTION, SOLUTION INTRAMUSCULAR; INTRAVENOUS
COMMUNITY
End: 2024-11-15

## 2024-11-15 RX ORDER — OMEPRAZOLE 40 MG/1
40 CAPSULE, DELAYED RELEASE ORAL DAILY
Qty: 90 CAPSULE | Refills: 3 | Status: SHIPPED | OUTPATIENT
Start: 2024-11-15

## 2024-11-15 RX ORDER — VANCOMYCIN HYDROCHLORIDE 1 G/20ML
INJECTION, POWDER, LYOPHILIZED, FOR SOLUTION INTRAVENOUS
COMMUNITY

## 2024-11-15 RX ORDER — HYDROCODONE BITARTRATE AND ACETAMINOPHEN 10; 325 MG/1; MG/1
1 TABLET ORAL
Qty: 30 TABLET | Refills: 0 | Status: SHIPPED | OUTPATIENT
Start: 2024-11-15 | End: 2024-12-15

## 2024-11-15 RX ORDER — DIAZEPAM 2 MG/1
2 TABLET ORAL
Qty: 1 TABLET | Refills: 0 | Status: SHIPPED | OUTPATIENT
Start: 2024-11-15 | End: 2024-11-16

## 2024-11-15 ASSESSMENT — FIBROSIS 4 INDEX: FIB4 SCORE: 1.35

## 2024-11-15 NOTE — ASSESSMENT & PLAN NOTE
Pt has suffered from IBS and bleeding hemorrhoids for 10 yrs.   Did heavy construction work, and carrying heavy things as a  also.   She has over past few years worsening symptoms of bleeding hemorrhoids, stool incontinence which has been very distressing to her and very limiting to her life.   She has not been able to go out to eat, or go to her kids houses due to the bleeding and incontinence of stool.   Suppositories did not work due to them not staying in her rectum.   She felt weak once as the bleeding from the hemorrhoid continued though the day.   She has mucus with stools, alternating constipation and overflow diarrhea.   She has no relief with topical steroid creams as she has bleeding and diarrhea so frequently through the day.     Ref to general surgery for evalution for hemorrhoidectomy and to GI for help with severe IBS provided.   She is very anxious about medical professionals having to examine her as she is very private. She will prefer female providers. Rx for valium also provided.

## 2024-11-15 NOTE — ASSESSMENT & PLAN NOTE
Pt has for about a year she has nose drainage clear every day when she gets up after sleep. Both ears and eyes also drain clear and down the back of her throat.     Sounds like an ocean in her head when laying down in the ear she is laying on. No symptoms during the day. Had to use a TV or white noise for years.     Has b/l ear clear drainage.   She has no improvement with her seasonal allergy medication OTC anti histamine.   She had no improvement with flonase  Rx for azelastine provided.     She has chronic dry eye but only has clear eyes drainage in the moring

## 2024-11-15 NOTE — PROGRESS NOTES
Subjective:   Hanna Marcelo is a 74 y.o. female here today for evaluation and management of:     Chronic rhinitis  Pt has for about a year she has nose drainage clear every day when she gets up after sleep. Both ears and eyes also drain clear and down the back of her throat.     Sounds like an ocean in her head when laying down in the ear she is laying on. No symptoms during the day. Had to use a TV or white noise for years.     Has b/l ear clear drainage.   She has no improvement with her seasonal allergy medication OTC anti histamine.   She had no improvement with flonase  Rx for azelastine provided.     She has chronic dry eye but only has clear eyes drainage in the moring     Hemorrhoids  Pt has suffered from IBS and bleeding hemorrhoids for 10 yrs.   Did heavy construction work, and carrying heavy things as a  also.   She has over past few years worsening symptoms of bleeding hemorrhoids, stool incontinence which has been very distressing to her and very limiting to her life.   She has not been able to go out to eat, or go to her kids houses due to the bleeding and incontinence of stool.   Suppositories did not work due to them not staying in her rectum.   She felt weak once as the bleeding from the hemorrhoid continued though the day.   She has mucus with stools, alternating constipation and overflow diarrhea.   She has no relief with topical steroid creams as she has bleeding and diarrhea so frequently through the day.     Ref to general surgery for evalution for hemorrhoidectomy and to GI for help with severe IBS provided.   She is very anxious about medical professionals having to examine her as she is very private. She will prefer female providers. Rx for valium also provided.          Current medicines (including changes today)  Current Outpatient Medications   Medication Sig Dispense Refill    azelastine (ASTELIN) 137 MCG/SPRAY nasal spray Administer 1 Spray into affected nostril(S) 2 times  a day. 30 mL 3    omeprazole (PRILOSEC) 40 MG delayed-release capsule Take 1 Capsule by mouth every day. 90 Capsule 3    HYDROcodone/acetaminophen (NORCO)  MG Tab Take 1 Tablet by mouth 1 time a day as needed for Severe Pain for up to 30 days. 30 Tablet 0    diazePAM (VALIUM) 2 MG Tab Take 1 Tablet by mouth 1 time a day as needed for Anxiety for up to 1 day. For anxiety with medical procedure 1 Tablet 0    lisinopril-hydrochlorothiazide (PRINZIDE) 10-12.5 MG per tablet Take 1 tablet by mouth once daily 90 Tablet 3    DULoxetine (CYMBALTA) 20 MG Cap DR Particles Take 1 Capsule by mouth every day. 90 Capsule 3    meloxicam (MOBIC) 7.5 MG Tab Take 1 tablet by mouth once daily 90 Tablet 3    albuterol 108 (90 Base) MCG/ACT Aero Soln inhalation aerosol Inhale 2 Puffs every 6 hours as needed for Shortness of Breath. 8.5 g 3    rosuvastatin (CRESTOR) 10 MG Tab Take 1 Tablet by mouth every evening. 90 Tablet 3    ondansetron (ZOFRAN) 4 MG Tab tablet Take 1 Tablet by mouth every four hours as needed for Nausea/Vomiting. 20 Tablet 1    Simethicone 180 MG Cap Take 1 Capsule by mouth every day.      cyanocobalamin (VITAMIN B-12) 100 MCG Tab Take 2,500 mcg by mouth every day.      acetaminophen (TYLENOL) 650 MG CR tablet Take 650 mg by mouth.      cyclobenzaprine (FLEXERIL) 10 mg Tab TAKE ONE TABLET BY MOUTH THREE TIMES DAILY AS NEEDED FOR SPASMS 270 Tablet 3    Artificial Tear Ointment (DRY EYES OP) Place 1 Drop in both eyes 1 time daily as needed.      multivitamin (THERAGRAN) Tab Take 1 Tab by mouth every day.      vancomycin (VANCOCIN) 1 g Recon Soln        No current facility-administered medications for this visit.     She  has a past medical history of Arthritis, Breath shortness, Bronchitis, CAD (coronary artery disease), Dental disorder (07/2018), DJD (degenerative joint disease), Fibromyalgia, GERD (gastroesophageal reflux disease), Heart burn, Heart murmur, Hepatitis B, Hiatus hernia syndrome, High cholesterol,  "Hyperlipidemia, Hypertension, IBD (inflammatory bowel disease), Indigestion, and Pain.    ROS  No chest pain, no shortness of breath, no abdominal pain       Objective:     /84 (BP Location: Left arm, Patient Position: Sitting, BP Cuff Size: Adult long)   Pulse 81   Temp 36.4 °C (97.5 °F) (Temporal)   Resp 17   Ht 1.664 m (5' 5.5\")   Wt 86.6 kg (191 lb)   SpO2 99%  Body mass index is 31.3 kg/m².   Physical Exam:  Constitutional: Alert, no distress.  Skin: Warm, dry, good turgor, no rashes in visible areas.  Eye: Equal, round and reactive, conjunctiva clear, lids normal.  ENMT: Lips without lesions, good dentition, oropharynx clear.  Neck: Trachea midline, no masses, no thyromegaly. No cervical or supraclavicular lymphadenopathy  Respiratory: Unlabored respiratory effort, lungs clear to auscultation, no wheezes, no ronchi.  Cardiovascular: Normal S1, S2, no murmur, no edema.  Abdomen: Soft, non-tender, no masses, no hepatosplenomegaly.  Psych: Alert and oriented x3, normal affect and mood.    Assessment and Plan:   The following treatment plan was discussed    1. Need for vaccination  - INFLUENZA VACCINE, HIGH DOSE (65+ ONLY)    2. Chronic rhinitis  - Referral to ENT    3. Hemorrhoids, unspecified hemorrhoid type  - Referral to Gastroenterology  - Referral to General Surgery    4. Generalized abdominal pain  - H.PYLORI STOOL ANTIGEN; Future    5. Chronic use of opiate for therapeutic purpose  - HYDROcodone/acetaminophen (NORCO)  MG Tab; Take 1 Tablet by mouth 1 time a day as needed for Severe Pain for up to 30 days.  Dispense: 30 Tablet; Refill: 0    6. Lumbar radiculopathy  - HYDROcodone/acetaminophen (NORCO)  MG Tab; Take 1 Tablet by mouth 1 time a day as needed for Severe Pain for up to 30 days.  Dispense: 30 Tablet; Refill: 0    7. Neuropathy involving both lower extremities  - HYDROcodone/acetaminophen (NORCO)  MG Tab; Take 1 Tablet by mouth 1 time a day as needed for Severe Pain " for up to 30 days.  Dispense: 30 Tablet; Refill: 0    8. Anxiety  - diazePAM (VALIUM) 2 MG Tab; Take 1 Tablet by mouth 1 time a day as needed for Anxiety for up to 1 day. For anxiety with medical procedure  Dispense: 1 Tablet; Refill: 0    Other orders  - vancomycin (VANCOCIN) 1 g Recon Soln  - azelastine (ASTELIN) 137 MCG/SPRAY nasal spray; Administer 1 Spray into affected nostril(S) 2 times a day.  Dispense: 30 mL; Refill: 3  - omeprazole (PRILOSEC) 40 MG delayed-release capsule; Take 1 Capsule by mouth every day.  Dispense: 90 Capsule; Refill: 3      Followup: No follow-ups on file.

## 2024-11-19 PROCEDURE — G0008 ADMIN INFLUENZA VIRUS VAC: HCPCS

## 2024-11-19 PROCEDURE — 90662 IIV NO PRSV INCREASED AG IM: CPT

## 2024-12-17 RX ORDER — AZELASTINE 1 MG/ML
1 SPRAY, METERED NASAL 2 TIMES DAILY
Qty: 30 ML | Refills: 3 | Status: SHIPPED | OUTPATIENT
Start: 2024-12-17

## 2024-12-17 NOTE — TELEPHONE ENCOUNTER
Received request via: Patient    Was the patient seen in the last year in this department? Yes    Does the patient have an active prescription (recently filled or refills available) for medication(s) requested? No    Pharmacy Name: [facility]    Does the patient have shelter Plus and need 100-day supply? (This applies to ALL medications) Patient does not have SCP

## 2025-01-28 RX ORDER — OMEPRAZOLE 40 MG/1
40 CAPSULE, DELAYED RELEASE ORAL DAILY
Qty: 90 CAPSULE | Refills: 3 | Status: SHIPPED | OUTPATIENT
Start: 2025-01-28

## 2025-04-01 RX ORDER — ROSUVASTATIN CALCIUM 10 MG/1
10 TABLET, COATED ORAL EVERY EVENING
Qty: 90 TABLET | Refills: 3 | Status: SHIPPED | OUTPATIENT
Start: 2025-04-01

## 2025-04-01 NOTE — TELEPHONE ENCOUNTER
Received request via: Patient    Was the patient seen in the last year in this department? Yes    Does the patient have an active prescription (recently filled or refills available) for medication(s) requested? No    Pharmacy Name: [facility] [place]    Does the patient have prison Plus and need 100-day supply? (This applies to ALL medications) Patient does not have SCP

## 2025-05-09 DIAGNOSIS — M15.9 OSTEOARTHRITIS OF MULTIPLE JOINTS, UNSPECIFIED OSTEOARTHRITIS TYPE: ICD-10-CM

## 2025-05-09 DIAGNOSIS — R06.02 SOB (SHORTNESS OF BREATH): ICD-10-CM

## 2025-05-12 NOTE — TELEPHONE ENCOUNTER
Received request via: Patient    Was the patient seen in the last year in this department? Yes    Does the patient have an active prescription (recently filled or refills available) for medication(s) requested? No    Pharmacy Name: [facility]      Does the patient have [facility] Plus and need 100-day supply? (This applies to ALL medications) Patient does not have SCP    Last OV 11 15 2024

## 2025-05-14 RX ORDER — DULOXETIN HYDROCHLORIDE 20 MG/1
20 CAPSULE, DELAYED RELEASE ORAL DAILY
Qty: 90 CAPSULE | Refills: 3 | Status: SHIPPED | OUTPATIENT
Start: 2025-05-14

## 2025-05-14 RX ORDER — ALBUTEROL SULFATE 90 UG/1
2 INHALANT RESPIRATORY (INHALATION) EVERY 6 HOURS PRN
Qty: 8.5 G | Refills: 3 | Status: SHIPPED | OUTPATIENT
Start: 2025-05-14

## 2025-05-14 RX ORDER — MELOXICAM 7.5 MG/1
7.5 TABLET ORAL DAILY
Qty: 90 TABLET | Refills: 3 | Status: SHIPPED | OUTPATIENT
Start: 2025-05-14

## 2025-08-02 DIAGNOSIS — I10 ESSENTIAL HYPERTENSION: ICD-10-CM

## 2025-08-05 RX ORDER — LISINOPRIL AND HYDROCHLOROTHIAZIDE 10; 12.5 MG/1; MG/1
1 TABLET ORAL DAILY
Qty: 90 TABLET | Refills: 3 | Status: SHIPPED | OUTPATIENT
Start: 2025-08-05

## (undated) DEVICE — SENSOR SPO2 NEO LNCS ADHESIVE (20/BX) SEE USER NOTES

## (undated) DEVICE — NEEDLE INSFL 120MM 14GA VRRS - (20/BX)

## (undated) DEVICE — GLOVE BIOGEL INDICATOR SZ 8 SURGICAL PF LTX - (50/BX 4BX/CA)

## (undated) DEVICE — SUTURE 4-0 VICRYL PLUS FS-2 - 27 INCH (36/BX)

## (undated) DEVICE — PROTECTOR ULNA NERVE - (36PR/CA)

## (undated) DEVICE — SPONGE GAUZESTER. 2X2 4-PL - (2/PK 50PK/BX 30BX/CS)

## (undated) DEVICE — TUBING CLEARLINK DUO-VENT - C-FLO (48EA/CA)

## (undated) DEVICE — SET SUCTION/IRRIGATION WITH DISPOSABLE TIP (6/CA )PART #0250-070-520 IS A SUB

## (undated) DEVICE — CANISTER SUCTION 3000ML MECHANICAL FILTER AUTO SHUTOFF MEDI-VAC NONSTERILE LF DISP  (40EA/CA)

## (undated) DEVICE — SUTURE GENERAL

## (undated) DEVICE — MASK ANESTHESIA ADULT  - (100/CA)

## (undated) DEVICE — TROCAR 5X100 NON BLADED Z-TH - READ KII (6/BX)

## (undated) DEVICE — TUBE E-T HI-LO CUFF 7.0MM (10EA/PK)

## (undated) DEVICE — GLOVE BIOGEL SZ 7.5 SURGICAL PF LTX - (50PR/BX 4BX/CA)

## (undated) DEVICE — TROCAR Z THREAD11MM OPTICAL - NON BLADED(6/BX)

## (undated) DEVICE — DRESSING TRANSPARENT FILM TEGADERM 2.375 X 2.75"  (100EA/BX)"

## (undated) DEVICE — HEAD HOLDER JUNIOR/ADULT

## (undated) DEVICE — KIT ANESTHESIA W/CIRCUIT & 3/LT BAG W/FILTER (20EA/CA)

## (undated) DEVICE — NEPTUNE 4 PORT MANIFOLD - (20/PK)

## (undated) DEVICE — SUCTION INSTRUMENT YANKAUER BULBOUS TIP W/O VENT (50EA/CA)

## (undated) DEVICE — SET LEADWIRE 5 LEAD BEDSIDE DISPOSABLE ECG (1SET OF 5/EA)

## (undated) DEVICE — PACK LAP CHOLE OR - (2EA/CA)

## (undated) DEVICE — SET EXTENSION WITH 2 PORTS (48EA/CA) ***PART #2C8610 IS A SUBSTITUTE*****

## (undated) DEVICE — GLOVE BIOGEL PI INDICATOR SZ 6.5 SURGICAL PF LF - (50/BX 4BX/CA)

## (undated) DEVICE — GLOVE BIOGEL SZ 6.5 SURGICAL PF LTX (50PR/BX 4BX/CA)

## (undated) DEVICE — KIT ROOM DECONTAMINATION

## (undated) DEVICE — SODIUM CHL IRRIGATION 0.9% 1000ML (12EA/CA)

## (undated) DEVICE — ELECTRODE 850 FOAM ADHESIVE - HYDROGEL RADIOTRNSPRNT (50/PK)

## (undated) DEVICE — SLEEVE, VASO, THIGH, MED

## (undated) DEVICE — GLOVE BIOGEL M SZ 8 SURGICAL PF LTX - (50/BX 4BX/CA)

## (undated) DEVICE — SEALER VESSEL HARMONIC ACE PLUS WITH ADVANCED HEMOSTASIS 36CM (1/EA)

## (undated) DEVICE — ENDOSTITCH LOAD UNIT 0 SURGI - 12/CA

## (undated) DEVICE — TUBING LAPAROSCOPIC PLUME DEVICE (10EA/CA)

## (undated) DEVICE — CANNULA W/SEAL 5X100 Z-THRE - ADED KII (12/BX)

## (undated) DEVICE — ENDOSTITCH10MM SUTURING DEVIC - (3/CA)

## (undated) DEVICE — ELECTRODE DUAL RETURN W/ CORD - (50/PK)

## (undated) DEVICE — LACTATED RINGERS INJ 1000 ML - (14EA/CA 60CA/PF)

## (undated) DEVICE — TUBE NG SALEM SUMP 16FR (50EA/CA)

## (undated) DEVICE — TUBING INSUFFLATION - (10/BX)

## (undated) DEVICE — CHLORAPREP 26 ML APPLICATOR - ORANGE TINT(25/CA)